# Patient Record
Sex: MALE | Race: BLACK OR AFRICAN AMERICAN | NOT HISPANIC OR LATINO | Employment: OTHER | URBAN - NONMETROPOLITAN AREA
[De-identification: names, ages, dates, MRNs, and addresses within clinical notes are randomized per-mention and may not be internally consistent; named-entity substitution may affect disease eponyms.]

---

## 2020-08-05 ENCOUNTER — HOSPITAL ENCOUNTER (INPATIENT)
Facility: HOSPITAL | Age: 45
LOS: 6 days | Discharge: HOME | End: 2020-08-11
Attending: EMERGENCY MEDICINE | Admitting: INTERNAL MEDICINE

## 2020-08-05 ENCOUNTER — APPOINTMENT (OUTPATIENT)
Dept: GENERAL RADIOLOGY | Facility: HOSPITAL | Age: 45
End: 2020-08-05

## 2020-08-05 DIAGNOSIS — R65.20 SEVERE SEPSIS (HCC): Primary | ICD-10-CM

## 2020-08-05 DIAGNOSIS — A41.9 SEVERE SEPSIS (HCC): Primary | ICD-10-CM

## 2020-08-05 DIAGNOSIS — N17.9 AKI (ACUTE KIDNEY INJURY) (HCC): ICD-10-CM

## 2020-08-05 DIAGNOSIS — J18.9 PNEUMONIA OF RIGHT LUNG DUE TO INFECTIOUS ORGANISM, UNSPECIFIED PART OF LUNG: ICD-10-CM

## 2020-08-05 DIAGNOSIS — R77.8 ELEVATED TROPONIN: ICD-10-CM

## 2020-08-05 DIAGNOSIS — E11.10 DIABETIC KETOACIDOSIS WITHOUT COMA ASSOCIATED WITH TYPE 2 DIABETES MELLITUS (HCC): ICD-10-CM

## 2020-08-05 PROBLEM — I10 ESSENTIAL HYPERTENSION: Status: ACTIVE | Noted: 2020-08-05

## 2020-08-05 LAB
ACETONE BLD QL: ABNORMAL
ALBUMIN SERPL-MCNC: 3.2 G/DL (ref 3.5–5.2)
ALBUMIN/GLOB SERPL: 1 G/DL
ALP SERPL-CCNC: 105 U/L (ref 39–117)
ALT SERPL W P-5'-P-CCNC: 18 U/L (ref 1–41)
ANION GAP SERPL CALCULATED.3IONS-SCNC: 18 MMOL/L (ref 5–15)
ANION GAP SERPL CALCULATED.3IONS-SCNC: 22 MMOL/L (ref 5–15)
APTT PPP: 24.7 SECONDS (ref 20–40.3)
ARTERIAL PATENCY WRIST A: POSITIVE
AST SERPL-CCNC: 21 U/L (ref 1–40)
ATMOSPHERIC PRESS: 748 MMHG
BASE EXCESS BLDA CALC-SCNC: -6.5 MMOL/L (ref 0–2)
BASOPHILS # BLD AUTO: 0.06 10*3/MM3 (ref 0–0.2)
BASOPHILS NFR BLD AUTO: 0.4 % (ref 0–1.5)
BDY SITE: ABNORMAL
BILIRUB SERPL-MCNC: 0.2 MG/DL (ref 0–1.2)
BUN SERPL-MCNC: 71 MG/DL (ref 6–20)
BUN SERPL-MCNC: 79 MG/DL (ref 6–20)
BUN/CREAT SERPL: 23.6 (ref 7–25)
BUN/CREAT SERPL: 25.1 (ref 7–25)
CA-I BLD-MCNC: 5.24 MG/DL (ref 4.6–5.6)
CALCIUM SPEC-SCNC: 10.4 MG/DL (ref 8.6–10.5)
CALCIUM SPEC-SCNC: 9.7 MG/DL (ref 8.6–10.5)
CHLORIDE SERPL-SCNC: 102 MMOL/L (ref 98–107)
CHLORIDE SERPL-SCNC: 103 MMOL/L (ref 98–107)
CO2 SERPL-SCNC: 16 MMOL/L (ref 22–29)
CO2 SERPL-SCNC: 20 MMOL/L (ref 22–29)
CREAT SERPL-MCNC: 2.83 MG/DL (ref 0.76–1.27)
CREAT SERPL-MCNC: 3.35 MG/DL (ref 0.76–1.27)
CRP SERPL-MCNC: 0.87 MG/DL (ref 0–0.5)
D-LACTATE SERPL-SCNC: 3.5 MMOL/L (ref 0.5–2)
D-LACTATE SERPL-SCNC: 3.7 MMOL/L (ref 0.5–2)
DEPRECATED RDW RBC AUTO: 44.1 FL (ref 37–54)
EOSINOPHIL # BLD AUTO: 0 10*3/MM3 (ref 0–0.4)
EOSINOPHIL NFR BLD AUTO: 0 % (ref 0.3–6.2)
ERYTHROCYTE [DISTWIDTH] IN BLOOD BY AUTOMATED COUNT: 14.1 % (ref 12.3–15.4)
ETHANOL BLD-MCNC: <10 MG/DL (ref 0–10)
ETHANOL UR QL: <0.01 %
GAS FLOW AIRWAY: 1.5 LPM
GFR SERPL CREATININE-BSD FRML MDRD: 24 ML/MIN/1.73
GFR SERPL CREATININE-BSD FRML MDRD: 29 ML/MIN/1.73
GLOBULIN UR ELPH-MCNC: 3.2 GM/DL
GLUCOSE SERPL-MCNC: 1116 MG/DL (ref 65–99)
GLUCOSE SERPL-MCNC: 994 MG/DL (ref 65–99)
HCO3 BLDA-SCNC: 20.9 MMOL/L (ref 20–26)
HCT VFR BLD AUTO: 50.5 % (ref 37.5–51)
HGB BLD-MCNC: 16.1 G/DL (ref 13–17.7)
HOLD SPECIMEN: NORMAL
IMM GRANULOCYTES # BLD AUTO: 0.06 10*3/MM3 (ref 0–0.05)
IMM GRANULOCYTES NFR BLD AUTO: 0.4 % (ref 0–0.5)
INR PPP: 1.06 (ref 0.8–1.2)
LYMPHOCYTES # BLD AUTO: 1.61 10*3/MM3 (ref 0.7–3.1)
LYMPHOCYTES NFR BLD AUTO: 10 % (ref 19.6–45.3)
Lab: ABNORMAL
MAGNESIUM SERPL-MCNC: 2.6 MG/DL (ref 1.6–2.6)
MAGNESIUM SERPL-MCNC: 3.2 MG/DL (ref 1.6–2.6)
MCH RBC QN AUTO: 27.7 PG (ref 26.6–33)
MCHC RBC AUTO-ENTMCNC: 31.9 G/DL (ref 31.5–35.7)
MCV RBC AUTO: 86.9 FL (ref 79–97)
MODALITY: ABNORMAL
MONOCYTES # BLD AUTO: 1.04 10*3/MM3 (ref 0.1–0.9)
MONOCYTES NFR BLD AUTO: 6.5 % (ref 5–12)
NEUTROPHILS NFR BLD AUTO: 13.26 10*3/MM3 (ref 1.7–7)
NEUTROPHILS NFR BLD AUTO: 82.7 % (ref 42.7–76)
NRBC BLD AUTO-RTO: 0 /100 WBC (ref 0–0.2)
OSMOLALITY SERPL: 384 MOSM/KG (ref 280–290)
PCO2 BLDA: 47.7 MM HG (ref 35–45)
PH BLDA: 7.25 PH UNITS (ref 7.35–7.45)
PHOSPHATE SERPL-MCNC: 5.7 MG/DL (ref 2.5–4.5)
PHOSPHATE SERPL-MCNC: 6.7 MG/DL (ref 2.5–4.5)
PLATELET # BLD AUTO: 243 10*3/MM3 (ref 140–450)
PMV BLD AUTO: 12 FL (ref 6–12)
PO2 BLDA: 99.1 MM HG (ref 83–108)
POTASSIUM SERPL-SCNC: 6.4 MMOL/L (ref 3.5–5.2)
POTASSIUM SERPL-SCNC: 6.6 MMOL/L (ref 3.5–5.2)
PROT SERPL-MCNC: 6.4 G/DL (ref 6–8.5)
PROTHROMBIN TIME: 14.3 SECONDS (ref 11.1–15.3)
RBC # BLD AUTO: 5.81 10*6/MM3 (ref 4.14–5.8)
SAO2 % BLDCOA: 97.3 % (ref 94–99)
SODIUM SERPL-SCNC: 140 MMOL/L (ref 136–145)
SODIUM SERPL-SCNC: 141 MMOL/L (ref 136–145)
TROPONIN T SERPL-MCNC: 0.31 NG/ML (ref 0–0.03)
TROPONIN T SERPL-MCNC: 0.65 NG/ML (ref 0–0.03)
VENTILATOR MODE: ABNORMAL
WBC # BLD AUTO: 16.03 10*3/MM3 (ref 3.4–10.8)
WHOLE BLOOD HOLD SPECIMEN: NORMAL
WHOLE BLOOD HOLD SPECIMEN: NORMAL

## 2020-08-05 PROCEDURE — 25010000002 AZITHROMYCIN 500 MG/250 ML: Performed by: INTERNAL MEDICINE

## 2020-08-05 PROCEDURE — 36600 WITHDRAWAL OF ARTERIAL BLOOD: CPT

## 2020-08-05 PROCEDURE — 99285 EMERGENCY DEPT VISIT HI MDM: CPT

## 2020-08-05 PROCEDURE — 87040 BLOOD CULTURE FOR BACTERIA: CPT | Performed by: EMERGENCY MEDICINE

## 2020-08-05 PROCEDURE — 80053 COMPREHEN METABOLIC PANEL: CPT | Performed by: EMERGENCY MEDICINE

## 2020-08-05 PROCEDURE — 93010 ELECTROCARDIOGRAM REPORT: CPT | Performed by: INTERNAL MEDICINE

## 2020-08-05 PROCEDURE — 87635 SARS-COV-2 COVID-19 AMP PRB: CPT | Performed by: EMERGENCY MEDICINE

## 2020-08-05 PROCEDURE — 25010000002 CEFTRIAXONE PER 250 MG: Performed by: STUDENT IN AN ORGANIZED HEALTH CARE EDUCATION/TRAINING PROGRAM

## 2020-08-05 PROCEDURE — 36415 COLL VENOUS BLD VENIPUNCTURE: CPT | Performed by: EMERGENCY MEDICINE

## 2020-08-05 PROCEDURE — 83735 ASSAY OF MAGNESIUM: CPT | Performed by: EMERGENCY MEDICINE

## 2020-08-05 PROCEDURE — 82330 ASSAY OF CALCIUM: CPT | Performed by: EMERGENCY MEDICINE

## 2020-08-05 PROCEDURE — 84100 ASSAY OF PHOSPHORUS: CPT | Performed by: EMERGENCY MEDICINE

## 2020-08-05 PROCEDURE — 85730 THROMBOPLASTIN TIME PARTIAL: CPT | Performed by: EMERGENCY MEDICINE

## 2020-08-05 PROCEDURE — 71045 X-RAY EXAM CHEST 1 VIEW: CPT

## 2020-08-05 PROCEDURE — 82803 BLOOD GASES ANY COMBINATION: CPT

## 2020-08-05 PROCEDURE — 86140 C-REACTIVE PROTEIN: CPT | Performed by: EMERGENCY MEDICINE

## 2020-08-05 PROCEDURE — 85025 COMPLETE CBC W/AUTO DIFF WBC: CPT | Performed by: EMERGENCY MEDICINE

## 2020-08-05 PROCEDURE — 25010000002 HEPARIN (PORCINE) PER 1000 UNITS: Performed by: INTERNAL MEDICINE

## 2020-08-05 PROCEDURE — 25010000003 INSULIN REGULAR HUMAN PER 5 UNITS: Performed by: EMERGENCY MEDICINE

## 2020-08-05 PROCEDURE — 84484 ASSAY OF TROPONIN QUANT: CPT | Performed by: EMERGENCY MEDICINE

## 2020-08-05 PROCEDURE — 82009 KETONE BODYS QUAL: CPT | Performed by: EMERGENCY MEDICINE

## 2020-08-05 PROCEDURE — 80307 DRUG TEST PRSMV CHEM ANLYZR: CPT | Performed by: STUDENT IN AN ORGANIZED HEALTH CARE EDUCATION/TRAINING PROGRAM

## 2020-08-05 PROCEDURE — 83930 ASSAY OF BLOOD OSMOLALITY: CPT | Performed by: EMERGENCY MEDICINE

## 2020-08-05 PROCEDURE — 83605 ASSAY OF LACTIC ACID: CPT | Performed by: EMERGENCY MEDICINE

## 2020-08-05 PROCEDURE — 25010000003 INSULIN REGULAR HUMAN PER 5 UNITS: Performed by: INTERNAL MEDICINE

## 2020-08-05 PROCEDURE — 85610 PROTHROMBIN TIME: CPT | Performed by: EMERGENCY MEDICINE

## 2020-08-05 PROCEDURE — 93005 ELECTROCARDIOGRAM TRACING: CPT | Performed by: EMERGENCY MEDICINE

## 2020-08-05 RX ORDER — ASPIRIN 81 MG/1
81 TABLET, CHEWABLE ORAL DAILY
Status: DISCONTINUED | OUTPATIENT
Start: 2020-08-06 | End: 2020-08-11 | Stop reason: HOSPADM

## 2020-08-05 RX ORDER — SODIUM CHLORIDE 9 MG/ML
10 INJECTION, SOLUTION INTRAVENOUS CONTINUOUS PRN
Status: DISCONTINUED | OUTPATIENT
Start: 2020-08-05 | End: 2020-08-05

## 2020-08-05 RX ORDER — DEXTROSE, SODIUM CHLORIDE, AND POTASSIUM CHLORIDE 5; .9; .15 G/100ML; G/100ML; G/100ML
150 INJECTION INTRAVENOUS CONTINUOUS PRN
Status: DISCONTINUED | OUTPATIENT
Start: 2020-08-05 | End: 2020-08-05

## 2020-08-05 RX ORDER — POTASSIUM CHLORIDE, DEXTROSE MONOHYDRATE AND SODIUM CHLORIDE 300; 5; 900 MG/100ML; G/100ML; MG/100ML
150 INJECTION, SOLUTION INTRAVENOUS CONTINUOUS PRN
Status: DISCONTINUED | OUTPATIENT
Start: 2020-08-05 | End: 2020-08-05

## 2020-08-05 RX ORDER — SODIUM CHLORIDE 9 MG/ML
INJECTION, SOLUTION INTRAVENOUS
Status: DISCONTINUED
Start: 2020-08-05 | End: 2020-08-11 | Stop reason: HOSPADM

## 2020-08-05 RX ORDER — SODIUM CHLORIDE, SODIUM LACTATE, POTASSIUM CHLORIDE, CALCIUM CHLORIDE 600; 310; 30; 20 MG/100ML; MG/100ML; MG/100ML; MG/100ML
250 INJECTION, SOLUTION INTRAVENOUS CONTINUOUS
Status: DISCONTINUED | OUTPATIENT
Start: 2020-08-05 | End: 2020-08-07

## 2020-08-05 RX ORDER — DEXTROSE AND SODIUM CHLORIDE 5; .9 G/100ML; G/100ML
150 INJECTION, SOLUTION INTRAVENOUS CONTINUOUS PRN
Status: DISCONTINUED | OUTPATIENT
Start: 2020-08-05 | End: 2020-08-05

## 2020-08-05 RX ORDER — ACETAMINOPHEN 325 MG/1
650 TABLET ORAL EVERY 4 HOURS PRN
Status: DISCONTINUED | OUTPATIENT
Start: 2020-08-05 | End: 2020-08-11 | Stop reason: HOSPADM

## 2020-08-05 RX ORDER — POTASSIUM CHLORIDE, DEXTROSE MONOHYDRATE AND SODIUM CHLORIDE 300; 5; 900 MG/100ML; G/100ML; MG/100ML
150 INJECTION, SOLUTION INTRAVENOUS CONTINUOUS PRN
Status: DISCONTINUED | OUTPATIENT
Start: 2020-08-05 | End: 2020-08-06

## 2020-08-05 RX ORDER — RANITIDINE 150 MG/1
TABLET ORAL
COMMUNITY

## 2020-08-05 RX ORDER — SODIUM CHLORIDE AND POTASSIUM CHLORIDE 150; 900 MG/100ML; MG/100ML
250 INJECTION, SOLUTION INTRAVENOUS CONTINUOUS PRN
Status: DISCONTINUED | OUTPATIENT
Start: 2020-08-05 | End: 2020-08-05

## 2020-08-05 RX ORDER — SODIUM CHLORIDE 0.9 % (FLUSH) 0.9 %
10 SYRINGE (ML) INJECTION AS NEEDED
Status: DISCONTINUED | OUTPATIENT
Start: 2020-08-05 | End: 2020-08-05

## 2020-08-05 RX ORDER — CARVEDILOL 25 MG/1
TABLET ORAL
COMMUNITY

## 2020-08-05 RX ORDER — SODIUM CHLORIDE 0.9 % (FLUSH) 0.9 %
3 SYRINGE (ML) INJECTION EVERY 12 HOURS SCHEDULED
Status: DISCONTINUED | OUTPATIENT
Start: 2020-08-05 | End: 2020-08-05

## 2020-08-05 RX ORDER — DEXTROSE, SODIUM CHLORIDE, AND POTASSIUM CHLORIDE 5; .45; .3 G/100ML; G/100ML; G/100ML
150 INJECTION INTRAVENOUS CONTINUOUS PRN
Status: DISCONTINUED | OUTPATIENT
Start: 2020-08-05 | End: 2020-08-06

## 2020-08-05 RX ORDER — AMLODIPINE BESYLATE 10 MG/1
10 TABLET ORAL
Status: DISCONTINUED | OUTPATIENT
Start: 2020-08-06 | End: 2020-08-05

## 2020-08-05 RX ORDER — DEXTROSE MONOHYDRATE 25 G/50ML
12.5 INJECTION, SOLUTION INTRAVENOUS AS NEEDED
Status: DISCONTINUED | OUTPATIENT
Start: 2020-08-05 | End: 2020-08-07

## 2020-08-05 RX ORDER — ASPIRIN 81 MG/1
TABLET, CHEWABLE ORAL
COMMUNITY

## 2020-08-05 RX ORDER — SODIUM CHLORIDE 450 MG/100ML
250 INJECTION, SOLUTION INTRAVENOUS CONTINUOUS PRN
Status: DISCONTINUED | OUTPATIENT
Start: 2020-08-05 | End: 2020-08-05

## 2020-08-05 RX ORDER — DEXTROSE, SODIUM CHLORIDE, AND POTASSIUM CHLORIDE 5; .45; .15 G/100ML; G/100ML; G/100ML
150 INJECTION INTRAVENOUS CONTINUOUS PRN
Status: DISCONTINUED | OUTPATIENT
Start: 2020-08-05 | End: 2020-08-06

## 2020-08-05 RX ORDER — SODIUM CHLORIDE, SODIUM LACTATE, POTASSIUM CHLORIDE, CALCIUM CHLORIDE 600; 310; 30; 20 MG/100ML; MG/100ML; MG/100ML; MG/100ML
INJECTION, SOLUTION INTRAVENOUS
Status: COMPLETED
Start: 2020-08-05 | End: 2020-08-05

## 2020-08-05 RX ORDER — ISOSORBIDE MONONITRATE 30 MG/1
TABLET, EXTENDED RELEASE ORAL
COMMUNITY

## 2020-08-05 RX ORDER — SODIUM CHLORIDE 9 MG/ML
250 INJECTION, SOLUTION INTRAVENOUS CONTINUOUS PRN
Status: DISCONTINUED | OUTPATIENT
Start: 2020-08-05 | End: 2020-08-06

## 2020-08-05 RX ORDER — SODIUM CHLORIDE AND POTASSIUM CHLORIDE 300; 900 MG/100ML; MG/100ML
250 INJECTION, SOLUTION INTRAVENOUS CONTINUOUS PRN
Status: DISCONTINUED | OUTPATIENT
Start: 2020-08-05 | End: 2020-08-06

## 2020-08-05 RX ORDER — HYDROCHLOROTHIAZIDE 25 MG/1
TABLET ORAL
COMMUNITY

## 2020-08-05 RX ORDER — DEXTROSE, SODIUM CHLORIDE, AND POTASSIUM CHLORIDE 5; .45; .15 G/100ML; G/100ML; G/100ML
150 INJECTION INTRAVENOUS CONTINUOUS PRN
Status: DISCONTINUED | OUTPATIENT
Start: 2020-08-05 | End: 2020-08-05

## 2020-08-05 RX ORDER — DEXTROSE AND SODIUM CHLORIDE 5; .45 G/100ML; G/100ML
150 INJECTION, SOLUTION INTRAVENOUS CONTINUOUS PRN
Status: DISCONTINUED | OUTPATIENT
Start: 2020-08-05 | End: 2020-08-05

## 2020-08-05 RX ORDER — SODIUM CHLORIDE AND POTASSIUM CHLORIDE 150; 450 MG/100ML; MG/100ML
250 INJECTION, SOLUTION INTRAVENOUS CONTINUOUS PRN
Status: DISCONTINUED | OUTPATIENT
Start: 2020-08-05 | End: 2020-08-06

## 2020-08-05 RX ORDER — SODIUM CHLORIDE 450 MG/100ML
250 INJECTION, SOLUTION INTRAVENOUS CONTINUOUS PRN
Status: DISCONTINUED | OUTPATIENT
Start: 2020-08-05 | End: 2020-08-06

## 2020-08-05 RX ORDER — DEXTROSE, SODIUM CHLORIDE, AND POTASSIUM CHLORIDE 5; .45; .3 G/100ML; G/100ML; G/100ML
150 INJECTION INTRAVENOUS CONTINUOUS PRN
Status: DISCONTINUED | OUTPATIENT
Start: 2020-08-05 | End: 2020-08-05

## 2020-08-05 RX ORDER — AMLODIPINE BESYLATE 10 MG/1
TABLET ORAL
COMMUNITY

## 2020-08-05 RX ORDER — SODIUM CHLORIDE 0.9 % (FLUSH) 0.9 %
10 SYRINGE (ML) INJECTION EVERY 12 HOURS SCHEDULED
Status: DISCONTINUED | OUTPATIENT
Start: 2020-08-05 | End: 2020-08-11 | Stop reason: HOSPADM

## 2020-08-05 RX ORDER — HEPARIN SODIUM 5000 [USP'U]/ML
5000 INJECTION, SOLUTION INTRAVENOUS; SUBCUTANEOUS EVERY 8 HOURS SCHEDULED
Status: DISCONTINUED | OUTPATIENT
Start: 2020-08-05 | End: 2020-08-11 | Stop reason: HOSPADM

## 2020-08-05 RX ORDER — SODIUM CHLORIDE 0.9 % (FLUSH) 0.9 %
10 SYRINGE (ML) INJECTION ONCE AS NEEDED
Status: DISCONTINUED | OUTPATIENT
Start: 2020-08-05 | End: 2020-08-05

## 2020-08-05 RX ORDER — DEXTROSE AND SODIUM CHLORIDE 5; .45 G/100ML; G/100ML
150 INJECTION, SOLUTION INTRAVENOUS CONTINUOUS PRN
Status: DISPENSED | OUTPATIENT
Start: 2020-08-05 | End: 2020-08-07

## 2020-08-05 RX ORDER — SODIUM CHLORIDE 9 MG/ML
10 INJECTION, SOLUTION INTRAVENOUS CONTINUOUS PRN
Status: DISCONTINUED | OUTPATIENT
Start: 2020-08-05 | End: 2020-08-11 | Stop reason: HOSPADM

## 2020-08-05 RX ORDER — AZITHROMYCIN 250 MG/1
500 TABLET, FILM COATED ORAL
Status: DISCONTINUED | OUTPATIENT
Start: 2020-08-06 | End: 2020-08-07

## 2020-08-05 RX ORDER — SODIUM CHLORIDE 0.9 % (FLUSH) 0.9 %
10 SYRINGE (ML) INJECTION ONCE AS NEEDED
Status: DISCONTINUED | OUTPATIENT
Start: 2020-08-05 | End: 2020-08-11 | Stop reason: HOSPADM

## 2020-08-05 RX ORDER — DEXTROSE AND SODIUM CHLORIDE 5; .9 G/100ML; G/100ML
150 INJECTION, SOLUTION INTRAVENOUS CONTINUOUS PRN
Status: DISCONTINUED | OUTPATIENT
Start: 2020-08-05 | End: 2020-08-06

## 2020-08-05 RX ORDER — SODIUM CHLORIDE 0.9 % (FLUSH) 0.9 %
10 SYRINGE (ML) INJECTION AS NEEDED
Status: DISCONTINUED | OUTPATIENT
Start: 2020-08-05 | End: 2020-08-11 | Stop reason: HOSPADM

## 2020-08-05 RX ORDER — PANTOPRAZOLE SODIUM 40 MG/10ML
40 INJECTION, POWDER, LYOPHILIZED, FOR SOLUTION INTRAVENOUS
Status: DISCONTINUED | OUTPATIENT
Start: 2020-08-06 | End: 2020-08-11 | Stop reason: HOSPADM

## 2020-08-05 RX ORDER — SODIUM CHLORIDE 9 MG/ML
250 INJECTION, SOLUTION INTRAVENOUS CONTINUOUS PRN
Status: DISCONTINUED | OUTPATIENT
Start: 2020-08-05 | End: 2020-08-05

## 2020-08-05 RX ORDER — CARVEDILOL 25 MG/1
25 TABLET ORAL 2 TIMES DAILY WITH MEALS
Status: DISCONTINUED | OUTPATIENT
Start: 2020-08-06 | End: 2020-08-06

## 2020-08-05 RX ORDER — SODIUM CHLORIDE AND POTASSIUM CHLORIDE 150; 450 MG/100ML; MG/100ML
250 INJECTION, SOLUTION INTRAVENOUS CONTINUOUS PRN
Status: DISCONTINUED | OUTPATIENT
Start: 2020-08-05 | End: 2020-08-05

## 2020-08-05 RX ORDER — ISOSORBIDE MONONITRATE 30 MG/1
30 TABLET, EXTENDED RELEASE ORAL
Status: DISCONTINUED | OUTPATIENT
Start: 2020-08-06 | End: 2020-08-05

## 2020-08-05 RX ORDER — DEXTROSE, SODIUM CHLORIDE, AND POTASSIUM CHLORIDE 5; .9; .15 G/100ML; G/100ML; G/100ML
150 INJECTION INTRAVENOUS CONTINUOUS PRN
Status: DISCONTINUED | OUTPATIENT
Start: 2020-08-05 | End: 2020-08-06

## 2020-08-05 RX ORDER — SODIUM CHLORIDE 0.9 % (FLUSH) 0.9 %
3 SYRINGE (ML) INJECTION EVERY 12 HOURS SCHEDULED
Status: DISCONTINUED | OUTPATIENT
Start: 2020-08-05 | End: 2020-08-11 | Stop reason: HOSPADM

## 2020-08-05 RX ORDER — ONDANSETRON 2 MG/ML
4 INJECTION INTRAMUSCULAR; INTRAVENOUS EVERY 6 HOURS PRN
Status: DISCONTINUED | OUTPATIENT
Start: 2020-08-05 | End: 2020-08-11 | Stop reason: HOSPADM

## 2020-08-05 RX ORDER — DEXTROSE MONOHYDRATE 25 G/50ML
12.5 INJECTION, SOLUTION INTRAVENOUS AS NEEDED
Status: DISCONTINUED | OUTPATIENT
Start: 2020-08-05 | End: 2020-08-05

## 2020-08-05 RX ORDER — SODIUM CHLORIDE AND POTASSIUM CHLORIDE 300; 900 MG/100ML; MG/100ML
250 INJECTION, SOLUTION INTRAVENOUS CONTINUOUS PRN
Status: DISCONTINUED | OUTPATIENT
Start: 2020-08-05 | End: 2020-08-05

## 2020-08-05 RX ORDER — SODIUM CHLORIDE AND POTASSIUM CHLORIDE 150; 900 MG/100ML; MG/100ML
250 INJECTION, SOLUTION INTRAVENOUS CONTINUOUS PRN
Status: DISCONTINUED | OUTPATIENT
Start: 2020-08-05 | End: 2020-08-06

## 2020-08-05 RX ORDER — METHOCARBAMOL 500 MG/1
TABLET, FILM COATED ORAL
COMMUNITY

## 2020-08-05 RX ADMIN — SODIUM CHLORIDE 15 UNITS/HR: 9 INJECTION, SOLUTION INTRAVENOUS at 21:42

## 2020-08-05 RX ADMIN — Medication 3 ML: at 20:28

## 2020-08-05 RX ADMIN — SODIUM CHLORIDE, PRESERVATIVE FREE 10 ML: 5 INJECTION INTRAVENOUS at 21:44

## 2020-08-05 RX ADMIN — SODIUM CHLORIDE, POTASSIUM CHLORIDE, SODIUM LACTATE AND CALCIUM CHLORIDE 250 ML/HR: 600; 310; 30; 20 INJECTION, SOLUTION INTRAVENOUS at 22:05

## 2020-08-05 RX ADMIN — SODIUM CHLORIDE 2466 ML: 900 INJECTION, SOLUTION INTRAVENOUS at 18:05

## 2020-08-05 RX ADMIN — CEFTRIAXONE SODIUM 2 G: 2 INJECTION, POWDER, FOR SOLUTION INTRAMUSCULAR; INTRAVENOUS at 21:24

## 2020-08-05 RX ADMIN — SODIUM CHLORIDE, PRESERVATIVE FREE 3 ML: 5 INJECTION INTRAVENOUS at 21:44

## 2020-08-05 RX ADMIN — SODIUM CHLORIDE 13 UNITS/HR: 9 INJECTION, SOLUTION INTRAVENOUS at 20:00

## 2020-08-05 RX ADMIN — AZITHROMYCIN 500 MG: 500 INJECTION, POWDER, LYOPHILIZED, FOR SOLUTION INTRAVENOUS at 22:05

## 2020-08-05 RX ADMIN — SODIUM CHLORIDE 250 ML/HR: 4.5 INJECTION, SOLUTION INTRAVENOUS at 21:23

## 2020-08-05 RX ADMIN — SODIUM CHLORIDE, POTASSIUM CHLORIDE, SODIUM LACTATE AND CALCIUM CHLORIDE 1000 ML: 600; 310; 30; 20 INJECTION, SOLUTION INTRAVENOUS at 23:35

## 2020-08-05 RX ADMIN — HEPARIN SODIUM 5000 UNITS: 5000 INJECTION INTRAVENOUS; SUBCUTANEOUS at 23:35

## 2020-08-05 NOTE — ED NOTES
Patient was placed in face mask during first look triage.  Patient was wearing a face mask throughout encounter.  This RN wore personal protective equipment throughout the encounter.  Hand hygiene was performed before and after patient encounter.       Twila Vee, RN  08/05/20 1227

## 2020-08-05 NOTE — ED NOTES
Lab notified of patient screening positive for severe sepsis and needing second set of blood cultures and acetone.      Twila Vee RN  08/05/20 2205

## 2020-08-05 NOTE — ED PROVIDER NOTES
"Subjective   44 y/o with T2DM, HTN, HFrEF, GERD, presents complaining of nausea and abdominal pain of 2 days duration. He has not missed insulin. Nausea has progressed with no emesis and today he felt enough malaise to be brought in by ambulance for evaluation to the ED. Complains of generalized abdominal pain, 6/10, constant, dull/aching. No agggravating or alleviating factors. Associated with nausea, malaise. EMS glucose monitor measured \"high.\"           Review of Systems   Constitutional: Positive for chills. Negative for fatigue and fever.   HENT: Negative for congestion and sore throat.    Eyes: Negative for pain and visual disturbance.   Respiratory: Negative for cough and shortness of breath.    Cardiovascular: Negative for chest pain and leg swelling.   Gastrointestinal: Positive for abdominal pain and nausea.   Genitourinary: Negative for dysuria and flank pain.   Musculoskeletal: Negative for arthralgias and back pain.   Skin: Negative for pallor and rash.   Neurological: Negative for dizziness and headaches.   Psychiatric/Behavioral: Negative for confusion and dysphoric mood.       History reviewed. No pertinent past medical history.    Allergies   Allergen Reactions   • Penicillins Unknown - Low Severity       No past surgical history on file.    History reviewed. No pertinent family history.    Social History     Socioeconomic History   • Marital status:      Spouse name: Not on file   • Number of children: Not on file   • Years of education: Not on file   • Highest education level: Not on file           Objective   Physical Exam   Constitutional: He is oriented to person, place, and time. He appears well-developed and well-nourished. He appears ill. No distress.   HENT:   Head: Normocephalic and atraumatic.   Eyes: Pupils are equal, round, and reactive to light. EOM are normal.   Neck: Normal range of motion. Neck supple.   Cardiovascular: Regular rhythm and intact distal pulses. Tachycardia " present.   Pulmonary/Chest: Effort normal. He has decreased breath sounds (body habitus). He has no wheezes. He has no rhonchi. He has no rales.   Abdominal: Soft. There is tenderness (diffusely).   Musculoskeletal: Normal range of motion.        Right lower leg: He exhibits no edema.        Left lower leg: He exhibits no edema.   Neurological: He is alert and oriented to person, place, and time.   Skin: Capillary refill takes 2 to 3 seconds. There is pallor.       Procedures           ED Course  ED Course as of Aug 05 2111   Wed Aug 05, 2020   1809 Sepsis bolus ordered, VS monitored   BP(!): 70/46 [CZ]   1948 Acetone(!): Moderate [CZ]   2037 Troponin T(!!): 0.313 [EMMA]      ED Course User Index  [CZ] Cullen Keane MD  [EMMA] Negrito Rebollar PA-C                                           MDM  Number of Diagnoses or Management Options  Diabetic ketoacidosis without coma associated with type 2 diabetes mellitus (CMS/HCC): established and improving  Severe sepsis (CMS/HCC): new and requires workup  Diagnosis management comments: Patient with known T2DM with worsening shortness of air and malaise with work up indicative of DKA by pH, CO2, glucose level, serum acetone. He additionally has a RLL pnemonia. His vitals were such that he was classified as severe sepsis (elevated WBC and source) and intially had low BP that responded well to NS bolus. Lactate elevated. To receive 2g rocephin and 500 mg azithromycin. Also has possible HEATHER, no baseline renal function known. He also has an elevated troponin of undetermined significance as he is not complaining of chest pain. Hospitalist consulted and sign out given by Dr. Kraft to Dr. Hi who will admit patient and handle further management.        Amount and/or Complexity of Data Reviewed  Clinical lab tests: reviewed and ordered  Tests in the radiology section of CPT®: reviewed and ordered  Tests in the medicine section of CPT®: reviewed and ordered  Discuss  the patient with other providers: yes  Independent visualization of images, tracings, or specimens: yes    Risk of Complications, Morbidity, and/or Mortality  Presenting problems: moderate  Diagnostic procedures: moderate  Management options: moderate    Patient Progress  Patient progress: improved      Final diagnoses:   Severe sepsis (CMS/HCC)   Diabetic ketoacidosis without coma associated with type 2 diabetes mellitus (CMS/HCC)   HEATHER (acute kidney injury) (CMS/[facility])   Elevated troponin   Pneumonia of right lung due to infectious organism, unspecified part of lung            Cullen Keane MD  Resident  08/05/20 2774

## 2020-08-06 LAB
ANION GAP SERPL CALCULATED.3IONS-SCNC: 11 MMOL/L (ref 5–15)
ANION GAP SERPL CALCULATED.3IONS-SCNC: 14 MMOL/L (ref 5–15)
ANION GAP SERPL CALCULATED.3IONS-SCNC: 14 MMOL/L (ref 5–15)
ANION GAP SERPL CALCULATED.3IONS-SCNC: 19 MMOL/L (ref 5–15)
ARTERIAL PATENCY WRIST A: ABNORMAL
ARTERIAL PATENCY WRIST A: ABNORMAL
ATMOSPHERIC PRESS: 749 MMHG
ATMOSPHERIC PRESS: 749 MMHG
BACTERIA UR QL AUTO: ABNORMAL /HPF
BASE EXCESS BLDA CALC-SCNC: -3.4 MMOL/L (ref 0–2)
BASE EXCESS BLDA CALC-SCNC: -4.1 MMOL/L (ref 0–2)
BASOPHILS # BLD AUTO: 0.03 10*3/MM3 (ref 0–0.2)
BASOPHILS NFR BLD AUTO: 0.2 % (ref 0–1.5)
BDY SITE: ABNORMAL
BDY SITE: ABNORMAL
BILIRUB UR QL STRIP: NEGATIVE
BUN SERPL-MCNC: 61 MG/DL (ref 6–20)
BUN SERPL-MCNC: 69 MG/DL (ref 6–20)
BUN SERPL-MCNC: 72 MG/DL (ref 6–20)
BUN SERPL-MCNC: 78 MG/DL (ref 6–20)
BUN/CREAT SERPL: 20.7 (ref 7–25)
BUN/CREAT SERPL: 22.1 (ref 7–25)
BUN/CREAT SERPL: 22.2 (ref 7–25)
BUN/CREAT SERPL: 22.8 (ref 7–25)
CALCIUM SPEC-SCNC: 10 MG/DL (ref 8.6–10.5)
CALCIUM SPEC-SCNC: 10 MG/DL (ref 8.6–10.5)
CALCIUM SPEC-SCNC: 9.4 MG/DL (ref 8.6–10.5)
CALCIUM SPEC-SCNC: 9.6 MG/DL (ref 8.6–10.5)
CHLORIDE SERPL-SCNC: 112 MMOL/L (ref 98–107)
CHLORIDE SERPL-SCNC: 114 MMOL/L (ref 98–107)
CHLORIDE SERPL-SCNC: 115 MMOL/L (ref 98–107)
CHLORIDE SERPL-SCNC: 118 MMOL/L (ref 98–107)
CLARITY UR: ABNORMAL
CO2 SERPL-SCNC: 18 MMOL/L (ref 22–29)
CO2 SERPL-SCNC: 23 MMOL/L (ref 22–29)
CO2 SERPL-SCNC: 23 MMOL/L (ref 22–29)
CO2 SERPL-SCNC: 24 MMOL/L (ref 22–29)
COLOR UR: YELLOW
CREAT SERPL-MCNC: 2.76 MG/DL (ref 0.76–1.27)
CREAT SERPL-MCNC: 3.11 MG/DL (ref 0.76–1.27)
CREAT SERPL-MCNC: 3.16 MG/DL (ref 0.76–1.27)
CREAT SERPL-MCNC: 3.77 MG/DL (ref 0.76–1.27)
D-LACTATE SERPL-SCNC: 4.5 MMOL/L (ref 0.5–2)
DEPRECATED RDW RBC AUTO: 43.8 FL (ref 37–54)
EOSINOPHIL # BLD AUTO: 0.01 10*3/MM3 (ref 0–0.4)
EOSINOPHIL NFR BLD AUTO: 0.1 % (ref 0.3–6.2)
ERYTHROCYTE [DISTWIDTH] IN BLOOD BY AUTOMATED COUNT: 13.7 % (ref 12.3–15.4)
GAS FLOW AIRWAY: 1 LPM
GAS FLOW AIRWAY: 2 LPM
GFR SERPL CREATININE-BSD FRML MDRD: 21 ML/MIN/1.73
GFR SERPL CREATININE-BSD FRML MDRD: 26 ML/MIN/1.73
GFR SERPL CREATININE-BSD FRML MDRD: 26 ML/MIN/1.73
GFR SERPL CREATININE-BSD FRML MDRD: 30 ML/MIN/1.73
GLUCOSE BLDC GLUCOMTR-MCNC: 127 MG/DL (ref 70–130)
GLUCOSE BLDC GLUCOMTR-MCNC: 128 MG/DL (ref 70–130)
GLUCOSE BLDC GLUCOMTR-MCNC: 131 MG/DL (ref 70–130)
GLUCOSE BLDC GLUCOMTR-MCNC: 139 MG/DL (ref 70–130)
GLUCOSE BLDC GLUCOMTR-MCNC: 143 MG/DL (ref 70–130)
GLUCOSE BLDC GLUCOMTR-MCNC: 149 MG/DL (ref 70–130)
GLUCOSE BLDC GLUCOMTR-MCNC: 168 MG/DL (ref 70–130)
GLUCOSE BLDC GLUCOMTR-MCNC: 171 MG/DL (ref 70–130)
GLUCOSE BLDC GLUCOMTR-MCNC: 175 MG/DL (ref 70–130)
GLUCOSE BLDC GLUCOMTR-MCNC: 190 MG/DL (ref 70–130)
GLUCOSE BLDC GLUCOMTR-MCNC: 205 MG/DL (ref 70–130)
GLUCOSE BLDC GLUCOMTR-MCNC: 226 MG/DL (ref 70–130)
GLUCOSE BLDC GLUCOMTR-MCNC: 249 MG/DL (ref 70–130)
GLUCOSE BLDC GLUCOMTR-MCNC: 254 MG/DL (ref 70–130)
GLUCOSE BLDC GLUCOMTR-MCNC: 267 MG/DL (ref 70–130)
GLUCOSE BLDC GLUCOMTR-MCNC: 288 MG/DL (ref 70–130)
GLUCOSE BLDC GLUCOMTR-MCNC: 344 MG/DL (ref 70–130)
GLUCOSE BLDC GLUCOMTR-MCNC: 353 MG/DL (ref 70–130)
GLUCOSE BLDC GLUCOMTR-MCNC: 460 MG/DL (ref 70–130)
GLUCOSE BLDC GLUCOMTR-MCNC: 52 MG/DL (ref 70–130)
GLUCOSE BLDC GLUCOMTR-MCNC: 572 MG/DL (ref 70–130)
GLUCOSE BLDC GLUCOMTR-MCNC: 584 MG/DL (ref 70–130)
GLUCOSE BLDC GLUCOMTR-MCNC: 590 MG/DL (ref 70–130)
GLUCOSE SERPL-MCNC: 149 MG/DL (ref 65–99)
GLUCOSE SERPL-MCNC: 253 MG/DL (ref 65–99)
GLUCOSE SERPL-MCNC: 281 MG/DL (ref 65–99)
GLUCOSE SERPL-MCNC: 284 MG/DL (ref 65–99)
GLUCOSE UR STRIP-MCNC: ABNORMAL MG/DL
HCO3 BLDA-SCNC: 21.8 MMOL/L (ref 20–26)
HCO3 BLDA-SCNC: 23.1 MMOL/L (ref 20–26)
HCT VFR BLD AUTO: 37.4 % (ref 37.5–51)
HGB BLD-MCNC: 11.9 G/DL (ref 13–17.7)
HGB UR QL STRIP.AUTO: NEGATIVE
HYALINE CASTS UR QL AUTO: ABNORMAL /LPF
IMM GRANULOCYTES # BLD AUTO: 0.06 10*3/MM3 (ref 0–0.05)
IMM GRANULOCYTES NFR BLD AUTO: 0.4 % (ref 0–0.5)
KETONES UR QL STRIP: ABNORMAL
LDH SERPL-CCNC: 231 U/L (ref 135–225)
LEUKOCYTE ESTERASE UR QL STRIP.AUTO: ABNORMAL
LYMPHOCYTES # BLD AUTO: 1.35 10*3/MM3 (ref 0.7–3.1)
LYMPHOCYTES NFR BLD AUTO: 9.8 % (ref 19.6–45.3)
Lab: ABNORMAL
Lab: ABNORMAL
MAGNESIUM SERPL-MCNC: 1.9 MG/DL (ref 1.6–2.6)
MCH RBC QN AUTO: 27.9 PG (ref 26.6–33)
MCHC RBC AUTO-ENTMCNC: 31.8 G/DL (ref 31.5–35.7)
MCV RBC AUTO: 87.6 FL (ref 79–97)
MODALITY: ABNORMAL
MODALITY: ABNORMAL
MONOCYTES # BLD AUTO: 1.28 10*3/MM3 (ref 0.1–0.9)
MONOCYTES NFR BLD AUTO: 9.3 % (ref 5–12)
NEUTROPHILS NFR BLD AUTO: 10.99 10*3/MM3 (ref 1.7–7)
NEUTROPHILS NFR BLD AUTO: 80.2 % (ref 42.7–76)
NITRITE UR QL STRIP: NEGATIVE
NRBC BLD AUTO-RTO: 0 /100 WBC (ref 0–0.2)
PCO2 BLDA: 42 MM HG (ref 35–45)
PCO2 BLDA: 46.1 MM HG (ref 35–45)
PH BLDA: 7.31 PH UNITS (ref 7.35–7.45)
PH BLDA: 7.32 PH UNITS (ref 7.35–7.45)
PH UR STRIP.AUTO: <=5 [PH] (ref 5–9)
PLATELET # BLD AUTO: 174 10*3/MM3 (ref 140–450)
PMV BLD AUTO: 11.1 FL (ref 6–12)
PO2 BLDA: 114 MM HG (ref 83–108)
PO2 BLDA: 91.6 MM HG (ref 83–108)
POTASSIUM SERPL-SCNC: 4.6 MMOL/L (ref 3.5–5.2)
POTASSIUM SERPL-SCNC: 4.6 MMOL/L (ref 3.5–5.2)
POTASSIUM SERPL-SCNC: 4.7 MMOL/L (ref 3.5–5.2)
POTASSIUM SERPL-SCNC: 4.8 MMOL/L (ref 3.5–5.2)
PROCALCITONIN SERPL-MCNC: 0.65 NG/ML (ref 0–0.25)
PROT UR QL STRIP: ABNORMAL
RBC # BLD AUTO: 4.27 10*6/MM3 (ref 4.14–5.8)
RBC # UR: ABNORMAL /HPF
REF LAB TEST METHOD: ABNORMAL
SAO2 % BLDCOA: 96.9 % (ref 94–99)
SAO2 % BLDCOA: 98.4 % (ref 94–99)
SARS-COV-2 N GENE RESP QL NAA+PROBE: NOT DETECTED
SODIUM SERPL-SCNC: 149 MMOL/L (ref 136–145)
SODIUM SERPL-SCNC: 152 MMOL/L (ref 136–145)
SP GR UR STRIP: 1.03 (ref 1–1.03)
SQUAMOUS #/AREA URNS HPF: ABNORMAL /HPF
TROPONIN T SERPL-MCNC: 0.48 NG/ML (ref 0–0.03)
TROPONIN T SERPL-MCNC: 0.74 NG/ML (ref 0–0.03)
TROPONIN T SERPL-MCNC: 0.75 NG/ML (ref 0–0.03)
UROBILINOGEN UR QL STRIP: ABNORMAL
VENTILATOR MODE: ABNORMAL
VENTILATOR MODE: ABNORMAL
WBC # BLD AUTO: 13.72 10*3/MM3 (ref 3.4–10.8)
WBC UR QL AUTO: ABNORMAL /HPF

## 2020-08-06 PROCEDURE — 83735 ASSAY OF MAGNESIUM: CPT | Performed by: INTERNAL MEDICINE

## 2020-08-06 PROCEDURE — 80048 BASIC METABOLIC PNL TOTAL CA: CPT | Performed by: INTERNAL MEDICINE

## 2020-08-06 PROCEDURE — 84145 PROCALCITONIN (PCT): CPT | Performed by: INTERNAL MEDICINE

## 2020-08-06 PROCEDURE — 85025 COMPLETE CBC W/AUTO DIFF WBC: CPT | Performed by: INTERNAL MEDICINE

## 2020-08-06 PROCEDURE — 84484 ASSAY OF TROPONIN QUANT: CPT | Performed by: INTERNAL MEDICINE

## 2020-08-06 PROCEDURE — 36415 COLL VENOUS BLD VENIPUNCTURE: CPT | Performed by: INTERNAL MEDICINE

## 2020-08-06 PROCEDURE — 82962 GLUCOSE BLOOD TEST: CPT

## 2020-08-06 PROCEDURE — 82803 BLOOD GASES ANY COMBINATION: CPT

## 2020-08-06 PROCEDURE — 83615 LACTATE (LD) (LDH) ENZYME: CPT | Performed by: INTERNAL MEDICINE

## 2020-08-06 PROCEDURE — 82010 KETONE BODYS QUAN: CPT | Performed by: INTERNAL MEDICINE

## 2020-08-06 PROCEDURE — 25010000002 CEFTRIAXONE PER 250 MG: Performed by: INTERNAL MEDICINE

## 2020-08-06 PROCEDURE — 25010000002 HEPARIN (PORCINE) PER 1000 UNITS: Performed by: INTERNAL MEDICINE

## 2020-08-06 PROCEDURE — 94799 UNLISTED PULMONARY SVC/PX: CPT

## 2020-08-06 PROCEDURE — 84484 ASSAY OF TROPONIN QUANT: CPT | Performed by: EMERGENCY MEDICINE

## 2020-08-06 PROCEDURE — 81001 URINALYSIS AUTO W/SCOPE: CPT | Performed by: INTERNAL MEDICINE

## 2020-08-06 PROCEDURE — 83036 HEMOGLOBIN GLYCOSYLATED A1C: CPT

## 2020-08-06 PROCEDURE — 36600 WITHDRAWAL OF ARTERIAL BLOOD: CPT

## 2020-08-06 PROCEDURE — 87086 URINE CULTURE/COLONY COUNT: CPT | Performed by: INTERNAL MEDICINE

## 2020-08-06 PROCEDURE — 83605 ASSAY OF LACTIC ACID: CPT | Performed by: INTERNAL MEDICINE

## 2020-08-06 PROCEDURE — 25010000003 INSULIN REGULAR HUMAN PER 5 UNITS: Performed by: INTERNAL MEDICINE

## 2020-08-06 RX ADMIN — DEXTROSE MONOHYDRATE 12.5 G: 25 INJECTION, SOLUTION INTRAVENOUS at 10:14

## 2020-08-06 RX ADMIN — CEFTRIAXONE SODIUM 2 G: 2 INJECTION, POWDER, FOR SOLUTION INTRAMUSCULAR; INTRAVENOUS at 20:47

## 2020-08-06 RX ADMIN — HEPARIN SODIUM 5000 UNITS: 5000 INJECTION INTRAVENOUS; SUBCUTANEOUS at 22:34

## 2020-08-06 RX ADMIN — SODIUM CHLORIDE, PRESERVATIVE FREE 3 ML: 5 INJECTION INTRAVENOUS at 20:21

## 2020-08-06 RX ADMIN — SODIUM CHLORIDE, PRESERVATIVE FREE 3 ML: 5 INJECTION INTRAVENOUS at 10:15

## 2020-08-06 RX ADMIN — AZITHROMYCIN 500 MG: 250 TABLET, FILM COATED ORAL at 20:46

## 2020-08-06 RX ADMIN — SODIUM CHLORIDE, POTASSIUM CHLORIDE, SODIUM LACTATE AND CALCIUM CHLORIDE 500 ML/HR: 600; 310; 30; 20 INJECTION, SOLUTION INTRAVENOUS at 05:13

## 2020-08-06 RX ADMIN — ASPIRIN 81 MG 81 MG: 81 TABLET ORAL at 08:59

## 2020-08-06 RX ADMIN — POTASSIUM CHLORIDE, DEXTROSE MONOHYDRATE AND SODIUM CHLORIDE 150 ML/HR: 150; 5; 450 INJECTION, SOLUTION INTRAVENOUS at 12:08

## 2020-08-06 RX ADMIN — SODIUM CHLORIDE 8 UNITS/HR: 9 INJECTION, SOLUTION INTRAVENOUS at 10:22

## 2020-08-06 RX ADMIN — SODIUM CHLORIDE, POTASSIUM CHLORIDE, SODIUM LACTATE AND CALCIUM CHLORIDE 500 ML/HR: 600; 310; 30; 20 INJECTION, SOLUTION INTRAVENOUS at 02:55

## 2020-08-06 RX ADMIN — SODIUM CHLORIDE, POTASSIUM CHLORIDE, SODIUM LACTATE AND CALCIUM CHLORIDE 500 ML/HR: 600; 310; 30; 20 INJECTION, SOLUTION INTRAVENOUS at 00:52

## 2020-08-06 RX ADMIN — HEPARIN SODIUM 5000 UNITS: 5000 INJECTION INTRAVENOUS; SUBCUTANEOUS at 15:11

## 2020-08-06 RX ADMIN — PANTOPRAZOLE SODIUM 40 MG: 40 INJECTION, POWDER, FOR SOLUTION INTRAVENOUS at 06:04

## 2020-08-06 RX ADMIN — SODIUM CHLORIDE 30 UNITS/HR: 9 INJECTION, SOLUTION INTRAVENOUS at 05:05

## 2020-08-06 RX ADMIN — SODIUM CHLORIDE 23 UNITS/HR: 9 INJECTION, SOLUTION INTRAVENOUS at 01:09

## 2020-08-06 RX ADMIN — DEXTROSE AND SODIUM CHLORIDE 150 ML/HR: 5; 450 INJECTION, SOLUTION INTRAVENOUS at 22:14

## 2020-08-06 RX ADMIN — SODIUM CHLORIDE, PRESERVATIVE FREE 10 ML: 5 INJECTION INTRAVENOUS at 20:21

## 2020-08-06 RX ADMIN — SODIUM CHLORIDE, PRESERVATIVE FREE 10 ML: 5 INJECTION INTRAVENOUS at 10:15

## 2020-08-06 RX ADMIN — HEPARIN SODIUM 5000 UNITS: 5000 INJECTION INTRAVENOUS; SUBCUTANEOUS at 06:04

## 2020-08-06 NOTE — PROGRESS NOTES
[facility]  INPATIENT PROGRESS NOTE    Length of Stay: 1  Date of Admission: 8/5/2020  Primary Care Physician: Maninder Dixon MD    Subjective   (S) negative for fever and nausea, vomiting; he stated that he feels slightly better    Review of Systems   Constitutional: Negative for fever.   HENT: Negative for congestion and trouble swallowing.    Eyes: Negative for redness.   Respiratory: Negative for chest tightness and shortness of breath.    Cardiovascular: Negative for chest pain.   Genitourinary: Negative for difficulty urinating.   Musculoskeletal: Negative for arthralgias.   Neurological: Negative for dizziness and headaches.   Psychiatric/Behavioral: Negative for agitation and behavioral problems.     All pertinent negatives and positives are as above. All other systems have been reviewed and are negative unless otherwise stated.     Prior to Admission medications    Medication Sig Start Date End Date Taking? Authorizing Provider   amLODIPine (NORVASC) 10 MG tablet amlodipine 10 mg tablet    Beth Cho MD   aspirin 81 MG chewable tablet aspirin 81 mg chewable tablet    Beth Cho MD   carvedilol (COREG) 25 MG tablet carvedilol 25 mg tablet    Beth Cho MD   Ergocalciferol (VITAMIN D2 PO) Vitamin D2 1,250 mcg (50,000 unit) capsule    Beth Cho MD   hydroCHLOROthiazide (HYDRODIURIL) 25 MG tablet hydrochlorothiazide 25 mg tablet    Beth Cho MD   Insulin Glargine (BASAGLAR KWIKPEN SC) Basaglar KwikPen U-100 Insulin 100 unit/mL (3 mL) subcutaneous    Beth Cho MD   isosorbide mononitrate (IMDUR) 30 MG 24 hr tablet isosorbide mononitrate ER 30 mg tablet,extended release 24 hr   Take 1 tablet every day by oral route.    Beth Cho MD   metFORMIN (GLUCOPHAGE) 1000 MG tablet metformin 1,000 mg tablet    Beth Cho MD   methocarbamol (ROBAXIN) 500 MG tablet  methocarbamol 500 mg tablet   Take 1 tablet every day by oral route.    Provider, MD Beth   raNITIdine (ZANTAC) 150 MG tablet ranitidine 150 mg tablet    Provider, MD Beth         aspirin 81 mg Oral Daily   azithromycin 500 mg Oral Q24H   cefTRIAXone 2 g Intravenous Q24H   heparin (porcine) 5,000 Units Subcutaneous Q8H   pantoprazole 40 mg Intravenous Q AM   sodium chloride 10 mL Intravenous Q12H   sodium chloride 3 mL Intravenous Q12H       dextrose 5 % and sodium chloride 0.45 % 150 mL/hr    dextrose 5 % and sodium chloride 0.45 % with KCl 20 mEq/L 150 mL/hr Last Rate: 150 mL/hr (08/06/20 1208)   dextrose 5 % and sodium chloride 0.45 % with KCl 40 mEq/L 150 mL/hr    dextrose 5 % and sodium chloride 0.9 % 150 mL/hr    dextrose 5 % and sodium chloride 0.9 % with KCl 20 mEq 150 mL/hr    dextrose 5% and sodium chloride 0.9% with KCl 40 mEq/L 150 mL/hr    insulin 13 Units/hr Last Rate: 3 Units/hr (08/06/20 1625)   lactated ringers 250 mL/hr Last Rate: 250 mL/hr (08/06/20 0706)   custom IV KCl infusion builder 250 mL/hr    sodium chloride 250 mL/hr    sodium chloride 0.45 % with KCl 20 mEq 250 mL/hr    sodium chloride 250 mL/hr    sodium chloride 10 mL/hr    sodium chloride 0.9 % with KCl 20 mEq 250 mL/hr    sodium chloride 0.9 % with KCl 40 mEq/L 250 mL/hr        Objective    Temp:  [98.1 °F (36.7 °C)-98.8 °F (37.1 °C)] 98.8 °F (37.1 °C)  Heart Rate:  [] 110  Resp:  [18-32] 22  BP: ()/(43-79) 135/60    Physical Exam  Obese patient, no in acute distress  Neck is supple; EOMI; oral mucous moist  Normal expansion of bilateral lungs; CTA x 2  Normal sinus rhythm; no murmurs  Abdomen globose, no tender to palpation; normal bowel sounds  Able to move upper and lower extremities  Alert and oriented x 4; no neuro focalization  He is calm, well behaved      Results Review:  I have reviewed the labs, radiology results, and diagnostic studies.    Laboratory Data:   Results from last 7 days   Lab Units  08/06/20  1115 08/06/20  0354 08/05/20  2225 08/05/20  1927   SODIUM mmol/L 152* 149* 141 140   POTASSIUM mmol/L 4.7 4.6 6.6* 6.4*   CHLORIDE mmol/L 115* 112* 103 102   CO2 mmol/L 23.0 18.0* 16.0* 20.0*   BUN mg/dL 78* 61* 79* 71*   CREATININE mg/dL 3.77* 2.76* 3.35* 2.83*   GLUCOSE mg/dL 149* 284* 994* 1,116*   CALCIUM mg/dL 10.0 9.4 10.4 9.7   BILIRUBIN mg/dL  --   --   --  0.2   ALK PHOS U/L  --   --   --  105   ALT (SGPT) U/L  --   --   --  18   AST (SGOT) U/L  --   --   --  21   ANION GAP mmol/L 14.0 19.0* 22.0* 18.0*     Estimated Creatinine Clearance: 36.4 mL/min (A) (by C-G formula based on SCr of 3.77 mg/dL (H)).  Results from last 7 days   Lab Units 08/06/20  0354 08/05/20  1927 08/05/20  1811   MAGNESIUM mg/dL 1.9 2.6 3.2*   PHOSPHORUS mg/dL  --  5.7* 6.7*         Results from last 7 days   Lab Units 08/06/20  0354 08/05/20  1811   WBC 10*3/mm3 13.72* 16.03*   HEMOGLOBIN g/dL 11.9* 16.1   HEMATOCRIT % 37.4* 50.5   PLATELETS 10*3/mm3 174 243     Results from last 7 days   Lab Units 08/05/20  1811   INR  1.06       Culture Data:   No results found for: BLOODCX  No results found for: URINECX  No results found for: RESPCX  No results found for: WOUNDCX  No results found for: STOOLCX  No components found for: BODYFLD    Radiology Data:   Imaging Results (Last 24 Hours)     Procedure Component Value Units Date/Time    XR Chest 1 View [[id number]] Collected:  08/05/20 1813     Updated:  08/05/20 1831    Narrative:       [facility] [place]    Patient Name: PEBBLES WONG    ORDERING: MONO CR     ATTENDING: MONO CR     REFERRING: MONO CR    -----------------------    PROCEDURE: Chest Single View    TECHNIQUE: Single AP view of the chest    COMPARISON: None    HISTORY: Severe Sepsis triage protocol, A41.9 Sepsis, unspecified  organism R65.20 Severe sepsis without septic shock E11.10 Type 2  diabetes mellitus with ketoacidosis without coma    FINDINGS:     Life-support  devices: There is a dual lead left subclavian  pacemaking device present.    Lungs/pleura: Hazy patchy infiltrate suspected within the right  mid to lower lung zones centrally. No dense consolidation,  effusion, or pneumothorax.    Heart, hilar and mediastinal structures: The heart size and  mediastinal contours are within limits of normal. The trachea is  midline.    Skeletal Structures: No acute findings. No free air beneath the  diaphragm.      Impression:       Hazy infiltrative change right mid to lower lung zone.    Electronically signed by:  Anthony Boyce MD  8/5/2020 6:30 PM CDT  Workstation: [id number]          I have reviewed the patient's current medications.     Assessment/Plan   Combination of DKA and HHS     In the setting of diabetes mellitus type 2     Anion gap has closed around noon; no lab work done around 4 pm     He is on 3 units of regular insulin drip     Will discontinue every solution with potassium in it due to his renal failure    Acute kidney injury on chronic kidney disease     Patient stated that he was discharge from [facility] in the first days of July/20 and he was told to follow up with a nephrologist      Pending new labs to reevaluate; continue with current IVF     Nephrology consulted for am    Troponin elevated     Will do follow up troponin and consult cardiology am     No chest pain reported    Sepsis?     Being treated for PNA     Doubtful; will order procalcitonin today and tomorrow and check lactic acid later on     Patient denies respiratory symptoms before; empirically continue with current antibiotics and reevaluate am     Check x ray 2 views am    Discharge Planning: I expect patient to be discharged to home in 2 to 3 days    Trent Celeste MD

## 2020-08-06 NOTE — ED NOTES
Spoke with Dr. De Luna regarding pt's blood pressure of 77/50. He reports to keep an eye on BP. Continue LR fluid bolus and LR running rate. If pt continues to get lower then call provider back.     Pt moved to a hospital bed at this time and made as comfortable as possible.      Melba Mckinney RN  08/05/20 7282

## 2020-08-06 NOTE — ED NOTES
Patient presents to ED with chief complaint of chest pain, hyperglycemia, abdominal pain. Patient was given 324mg of aspirin via EMS and one nitro sublingual.      Twila Vee RN  08/05/20 7670

## 2020-08-06 NOTE — PAYOR COMM NOTE
"[facility]mani Matute RN[facility]  [phone] phone  [phone] fax  Clinical Information    Kenny Wong (45 y.o. Male)     Date of Birth Social Security Number Address Home Phone MRN    1975  [address]  [facility] [address] [phone] [id number]    [employer] Status          None        Admission Date Admission Type Admitting Provider Attending Provider Department, Room/Bed    8/5/20 Emergency Jed De Luna MD Kirchner, Quinn J, MD [facility] CRITICAL CARE STEPDOWN, 09/A    Discharge Date Discharge Disposition Discharge Destination                       Attending Provider:  Jed De Luna MD    Allergies:  Penicillins    Isolation:  None   Infection:  None   Code Status:  CPR    Ht:  188 cm (74\")   Wt:  136 kg (300 lb 3.2 oz)    Admission Cmt:  None   Principal Problem:  Diabetic ketoacidosis without coma (CMS/HCC) [E11.10]                 Active Insurance as of 8/5/2020     Primary Coverage     Payor Plan Insurance Group Employer/Plan Group    [employer] [facility]      Payor Plan Address Payor Plan Phone Number Payor Plan Fax Number Effective Dates    [address] [address]   3/1/2020 - None Entered    [place] [address]       Subscriber Name Subscriber Birth Date Member ID       KENNY WONG 1975 [id number]                 Emergency Contacts      (Rel.) Home Phone Work Phone Mobile Phone    Salina Wong (Spouse) [phone] -- [phone]               History & Physical      Jed De Luna MD at 08/05/20 2149                [facility] Admission      Date of Admission: 8/5/2020, 21:49      Primary Care Physician: Maninder Dixon MD      Chief Complaint: Nausea and vomiting    HPI: 45-year-old -American male with past medical history significant for obesity, hypertension, poorly controlled type 2 diabetes " mellitus who presents to the emergency department with nausea and vomiting.  The patient states that 2-year-old increasingly fatigued about 4 hours ago which progressed to nausea and vomiting within the past 24 hours.  The patient states that he has been compliant with his insulin regimen as ordered and he tries to follow a strict diabetic diet.  The patient denies fevers, chills but does endorse diaphoresis with nausea and vomiting.  He denies any recent sick contacts.  He denies any significant cough or shortness of breath.  He endorses some mild loose stools over the past 24 hours.  No abdominal pain present.  He denies any noticeable skin rash lesions or ulcers.    Concurrent Medical History:  has no past medical history on file.    Past Surgical History:  has no past surgical history on file.    Family History: Hypertension and diabetes    Social History: Endorses intermittent alcohol use and tobacco use.    Allergies:   Allergies   Allergen Reactions   • Penicillins Unknown - Low Severity       Medications:   Prior to Admission medications    Medication Sig Start Date End Date Taking? Authorizing Provider   amLODIPine (NORVASC) 10 MG tablet amlodipine 10 mg tablet    Beth Cho MD   aspirin 81 MG chewable tablet aspirin 81 mg chewable tablet    Beth Cho MD   carvedilol (COREG) 25 MG tablet carvedilol 25 mg tablet    Beth Cho MD   Ergocalciferol (VITAMIN D2 PO) Vitamin D2 1,250 mcg (50,000 unit) capsule    Beth Cho MD   hydroCHLOROthiazide (HYDRODIURIL) 25 MG tablet hydrochlorothiazide 25 mg tablet    Beth Cho MD   Insulin Glargine (BASAGLAR KWIKPEN SC) Basaglar KwikPen U-100 Insulin 100 unit/mL (3 mL) subcutaneous    ProviderBeth MD   isosorbide mononitrate (IMDUR) 30 MG 24 hr tablet isosorbide mononitrate ER 30 mg tablet,extended release 24 hr   Take 1 tablet every day by oral route.    Beth Cho MD   metFORMIN (GLUCOPHAGE)  1000 MG tablet metformin 1,000 mg tablet    Provider, MD Beth   methocarbamol (ROBAXIN) 500 MG tablet methocarbamol 500 mg tablet   Take 1 tablet every day by oral route.    Provider, MD Beth   raNITIdine (ZANTAC) 150 MG tablet ranitidine 150 mg tablet    Provider, MD Beth       Review of Systems:  Review of Systems   Constitutional: Positive for diaphoresis and fatigue. Negative for chills and fever.   Respiratory: Negative for cough, shortness of breath and wheezing.    Cardiovascular: Negative for chest pain, palpitations and leg swelling.   Gastrointestinal: Positive for diarrhea, nausea and vomiting. Negative for abdominal distention and abdominal pain.   Genitourinary: Negative for dysuria and hematuria.   Musculoskeletal: Negative for arthralgias and myalgias.   Skin: Negative for rash and wound.   Neurological: Positive for light-headedness. Negative for dizziness, syncope and headaches.      Otherwise complete ROS is negative except as mentioned above.    Physical Exam:   Temp:  [98.3 °F (36.8 °C)] 98.3 °F (36.8 °C)  Heart Rate:  [] 106  Resp:  [24] 24  BP: ()/(46-70) 100/64  Physical Exam   Constitutional: He is oriented to person, place, and time. No distress.   Very mildly somnolent and fatigued lying in bed   HENT:   Head: Normocephalic and atraumatic.   Mouth/Throat: No oropharyngeal exudate.   Mucous membranes appear dry.  No oral lesions are present.   Eyes: Pupils are equal, round, and reactive to light. Conjunctivae and EOM are normal.   Neck: Normal range of motion.   Cardiovascular: Normal rate, regular rhythm, normal heart sounds and intact distal pulses.   No murmur heard.  Pulmonary/Chest: Effort normal and breath sounds normal. He has no wheezes. He has no rales.   Reduced breath sounds diffusely likely secondary to body habitus.   Abdominal: Soft. Bowel sounds are normal. He exhibits no distension. There is no tenderness. There is no guarding.      Musculoskeletal: Normal range of motion. He exhibits no edema or tenderness.   Neurological: He is alert and oriented to person, place, and time. No cranial nerve deficit.   Skin: No rash noted. He is diaphoretic. No erythema.       Results Reviewed:  I have personally reviewed current lab, radiology, and data and agree with results.  Lab Results (last 24 hours)     Procedure Component Value Units Date/Time    Hemoglobin A1C - Miscellaneous Test [[id number]] Collected:  08/05/20 1811    Specimen:  Blood Updated:  08/05/20 2046    Blood Gas, Arterial [[id number]]  (Abnormal) Collected:  08/05/20 2027    Specimen:  Arterial Blood Updated:  08/05/20 2028     Site Right Radial     Lawrence's Test Positive     pH, Arterial 7.250 pH units      Comment: 84 Value below reference range        pCO2, Arterial 47.7 mm Hg      Comment: 83 Value above reference range        pO2, Arterial 99.1 mm Hg      HCO3, Arterial 20.9 mmol/L      Base Excess, Arterial -6.5 mmol/L      Comment: 84 Value below reference range        O2 Saturation, Arterial 97.3 %      Barometric Pressure for Blood Gas 748 mmHg      Modality Nasal Cannula     Flow Rate 1.5 lpm      Ventilator Mode NA     Collected by NA     Comment: Meter: [id number]     :  [id number]       Ethanol [[id number]] Collected:  08/05/20 1927    Specimen:  Blood from Arm, Right Updated:  08/05/20 2014     Ethanol <10 mg/dL      Ethanol % <0.010 %     COVID-19, [facility] IN-HOUSE, NP SWAB IN TRANSPORT MEDIA 8-10 HR TAT - Swab, Nasopharynx [[id number]] Collected:  08/05/20 2002    Specimen:  Swab from Nasopharynx Updated:  08/05/20 2002    Troponin [[id number]]  (Abnormal) Collected:  08/05/20 1927    Specimen:  Blood from Arm, Right Updated:  08/05/20 2001     Troponin T 0.313 ng/mL     Narrative:       Troponin T Reference Range:  <= 0.03 ng/mL-   Negative for AMI  >0.03 ng/mL-     Abnormal for myocardial necrosis.  Clinicians would have to utilize clinical acumen, EKG, Troponin  and serial changes to determine if it is an Acute Myocardial Infarction or myocardial injury due to an underlying chronic condition.       Results may be falsely decreased if patient taking Biotin.      Comprehensive Metabolic Panel [[id number]]  (Abnormal) Collected:  08/05/20 1927    Specimen:  Blood from Arm, Right Updated:  08/05/20 2001     Glucose 1,116 mg/dL      BUN 71 mg/dL      Creatinine 2.83 mg/dL      Sodium 140 mmol/L      Potassium 6.4 mmol/L      Chloride 102 mmol/L      CO2 20.0 mmol/L      Calcium 9.7 mg/dL      Total Protein 6.4 g/dL      Albumin 3.20 g/dL      ALT (SGPT) 18 U/L      AST (SGOT) 21 U/L      Alkaline Phosphatase 105 U/L      Total Bilirubin 0.2 mg/dL      eGFR  African Amer 29 mL/min/1.73      Globulin 3.2 gm/dL      A/G Ratio 1.0 g/dL      BUN/Creatinine Ratio 25.1     Anion Gap 18.0 mmol/L     Narrative:       GFR Normal >60  Chronic Kidney Disease <60  Kidney Failure <15      Phosphorus [[id number]]  (Abnormal) Collected:  08/05/20 1927    Specimen:  Blood from Arm, Right Updated:  08/05/20 1950     Phosphorus 5.7 mg/dL     Magnesium [[id number]]  (Normal) Collected:  08/05/20 1927    Specimen:  Blood from Arm, Right Updated:  08/05/20 1950     Magnesium 2.6 mg/dL     Osmolality, Serum [[id number]]  (Abnormal) Collected:  08/05/20 1927    Specimen:  Blood Updated:  08/05/20 1944     Osmolality 384 mOsm/kg     Ketone Bodies, Serum (Not performed at [facility]) [[id number]] Collected:  08/05/20 1927    Specimen:  Blood Updated:  08/05/20 1939    Narrative:       The following orders were created for panel order Ketone Bodies, Serum (Not performed at [facility]).  Procedure                               Abnormality         Status                     ---------                               -----------         ------                     Acetone[[id number]]                      Abnormal            Final result                 Please view results for these tests on the individual orders.     Acetone [[id number]]  (Abnormal) Collected:  08/05/20 1927    Specimen:  Blood Updated:  08/05/20 1939     Acetone Moderate    Blood Culture - Blood, Wrist, Left [[id number]] Collected:  08/05/20 1927    Specimen:  Blood from Wrist, Left Updated:  08/05/20 1928    [place] Draw [[id number]] Collected:  08/05/20 1811    Specimen:  Blood from Arm, Right Updated:  08/05/20 1915    Narrative:       The following orders were created for panel order [place] Draw.  Procedure                               Abnormality         Status                     ---------                               -----------         ------                     Light Blue Top[[id number]]                                   Final result               Green Top (Gel)[[id number]]                                  Final result               Lavender Top[[id number]]                                     Final result               Gold Top - SST[[id number]]                                                                Please view results for these tests on the individual orders.    Light Blue Top [[id number]] Collected:  08/05/20 1811    Specimen:  Blood from Arm, Right Updated:  08/05/20 1915     Extra Tube hold for add-on     Comment: Auto resulted       Green Top (Gel) [[id number]] Collected:  08/05/20 1811    Specimen:  Blood from Arm, Right Updated:  08/05/20 1915     Extra Tube Hold for add-ons.     Comment: Auto resulted.       Lavender Top [[id number]] Collected:  08/05/20 1811    Specimen:  Blood from Arm, Right Updated:  08/05/20 1915     Extra Tube hold for add-on     Comment: Auto resulted       Magnesium [[id number]]  (Abnormal) Collected:  08/05/20 1811    Specimen:  Blood from Arm, Right Updated:  08/05/20 1836     Magnesium 3.2 mg/dL     Phosphorus [[id number]]  (Abnormal) Collected:  08/05/20 1811    Specimen:  Blood from Arm, Right Updated:  08/05/20 1836     Phosphorus 6.7 mg/dL     C-reactive Protein [[id number]]  (Abnormal) Collected:  08/05/20 1811     Specimen:  Blood from Arm, Right Updated:  08/05/20 1836     C-Reactive Protein 0.87 mg/dL     Protime-INR [[id number]]  (Normal) Collected:  08/05/20 1811    Specimen:  Blood from Arm, Right Updated:  08/05/20 1833     Protime 14.3 Seconds      INR 1.06    Narrative:       Therapeutic range for most indications is 2.0-3.0 INR,  or 2.5-3.5 for mechanical heart valves.    aPTT [[id number]]  (Normal) Collected:  08/05/20 1811    Specimen:  Blood from Arm, Right Updated:  08/05/20 1833     PTT 24.7 seconds     Narrative:       The recommended Heparin therapeutic range is 68-97 seconds.    Lactic Acid, Plasma [[id number]]  (Abnormal) Collected:  08/05/20 1811    Specimen:  Blood from Arm, Right Updated:  08/05/20 1828     Lactate 3.5 mmol/L     Lactic Acid, Reflex Timer (This will reflex a repeat order 3-3:15 hours after ordered.) [[id number]] Collected:  08/05/20 1811    Specimen:  Blood from Arm, Right Updated:  08/05/20 1828    Calcium, Ionized [[id number]]  (Normal) Collected:  08/05/20 1811    Specimen:  Blood from Arm, Right Updated:  08/05/20 1816     Ionized Calcium 5.24 mg/dL     CBC & Differential [[id number]] Collected:  08/05/20 1811    Specimen:  Blood from Arm, Right Updated:  08/05/20 1815    Narrative:       The following orders were created for panel order CBC & Differential.  Procedure                               Abnormality         Status                     ---------                               -----------         ------                     CBC Auto Differential[[id number]]        Abnormal            Final result                 Please view results for these tests on the individual orders.    CBC Auto Differential [[id number]]  (Abnormal) Collected:  08/05/20 1811    Specimen:  Blood from Arm, Right Updated:  08/05/20 1815     WBC 16.03 10*3/mm3      RBC 5.81 10*6/mm3      Hemoglobin 16.1 g/dL      Hematocrit 50.5 %      MCV 86.9 fL      MCH 27.7 pg      MCHC 31.9 g/dL      RDW 14.1 %      RDW-SD 44.1  fl      MPV 12.0 fL      Platelets 243 10*3/mm3      Neutrophil % 82.7 %      Lymphocyte % 10.0 %      Monocyte % 6.5 %      Eosinophil % 0.0 %      Basophil % 0.4 %      Immature Grans % 0.4 %      Neutrophils, Absolute 13.26 10*3/mm3      Lymphocytes, Absolute 1.61 10*3/mm3      Monocytes, Absolute 1.04 10*3/mm3      Eosinophils, Absolute 0.00 10*3/mm3      Basophils, Absolute 0.06 10*3/mm3      Immature Grans, Absolute 0.06 10*3/mm3      nRBC 0.0 /100 WBC     Blood Culture - Blood, Arm, Right [[id number]] Collected:  08/05/20 1811    Specimen:  Blood from Arm, Right Updated:  08/05/20 1811        Imaging Results (Last 24 Hours)     Procedure Component Value Units Date/Time    XR Chest 1 View [[id number]] Collected:  08/05/20 1813     Updated:  08/05/20 1831    Narrative:       [facility]    Patient Name: PEBBLES WONG    ORDERING: MONO CR     ATTENDING: MONO CR     REFERRING: MONO CR    -----------------------    PROCEDURE: Chest Single View    TECHNIQUE: Single AP view of the chest    COMPARISON: None    HISTORY: Severe Sepsis triage protocol, A41.9 Sepsis, unspecified  organism R65.20 Severe sepsis without septic shock E11.10 Type 2  diabetes mellitus with ketoacidosis without coma    FINDINGS:     Life-support devices: There is a dual lead left subclavian  pacemaking device present.    Lungs/pleura: Hazy patchy infiltrate suspected within the right  mid to lower lung zones centrally. No dense consolidation,  effusion, or pneumothorax.    Heart, hilar and mediastinal structures: The heart size and  mediastinal contours are within limits of normal. The trachea is  midline.    Skeletal Structures: No acute findings. No free air beneath the  diaphragm.      Impression:       Hazy infiltrative change right mid to lower lung zone.    Electronically signed by:  Anthony Boyce MD  8/5/2020 6:30 PM CDT  Workstation: [id number]            Assessment:    Active [facility]  Problems    Diagnosis   • **Diabetic ketoacidosis without coma (CMS/HCC)   • Severe sepsis (CMS/HCC)   • Essential hypertension   • Elevated troponin   • Community acquired pneumonia of right middle lobe of lung           Plan:    Diabetic ketoacidosis.  Patient with poorly controlled type 2 diabetes mellitus with concern for medication noncompliance despite patient reports.  A1c unable to be resulted (being sent to reference lab).  Underlying cause for DKA potentially gastroenteritis versus possible community-acquired pneumonia.  Serum pH is 7.250 with a glucose of 1116 and serum potassium of 6.4.  Anion gap of 18 and serum bicarbonate of 20.  We will initiate DKA insulin drip protocol and continue with aggressive volume resuscitation.  Monitoring basic metabolic panel every 4 hours.  Repeat ABG at midnight.  Nutrition consult and diabetic education consult ordered.    Concern for community-acquired pneumonia.  Chest x-ray revealing a hazy density within the right middle lobe.  While there are minimal respiratory symptoms present we will proceed with treatment for possible community-acquired pneumonia.  Coronavirus testing is pending.  Ceftriaxone and azithromycin ordered.    Elevated troponin.  This is likely secondary to stress of underlying DKA.  We will trend troponins for the next 12 hours.  EKG is negative for changes of ACS.  The patient is without chest pain.    Hypertension.  Patient was initially hypotensive upon presentation but has responded well to fluids.  We will hold off on antihypertensive agents this evening and resume tomorrow as her blood pressure normalizes.      Jed De Luna MD  Hospitalist Service                  Electronically signed by Jed De Luna MD at 08/05/20 5704       Physician Progress Notes (last 24 hours) (Notes from 08/05/20 1543 through 08/06/20 1543)    No notes of this type exist for this encounter.         Consult Notes (last 24 hours) (Notes from 08/05/20 6223  through 08/06/20 7003)    No notes of this type exist for this encounter.

## 2020-08-06 NOTE — ED NOTES
Lab reports they are not able to run the Hgb A1C. States that it has been ran five times. They are sending out to another facility. Provider made aware.      Melba Mckinney RN  08/05/20 2040

## 2020-08-06 NOTE — H&P
[facility] Medicine Admission      Date of Admission: 8/5/2020, 21:49      Primary Care Physician: Maninder Dixon MD      Chief Complaint: Nausea and vomiting    HPI: 45-year-old -American male with past medical history significant for obesity, hypertension, poorly controlled type 2 diabetes mellitus who presents to the emergency department with nausea and vomiting.  The patient states that 2-year-old increasingly fatigued about 4 hours ago which progressed to nausea and vomiting within the past 24 hours.  The patient states that he has been compliant with his insulin regimen as ordered and he tries to follow a strict diabetic diet.  The patient denies fevers, chills but does endorse diaphoresis with nausea and vomiting.  He denies any recent sick contacts.  He denies any significant cough or shortness of breath.  He endorses some mild loose stools over the past 24 hours.  No abdominal pain present.  He denies any noticeable skin rash lesions or ulcers.    Concurrent Medical History:  has no past medical history on file.    Past Surgical History:  has no past surgical history on file.    Family History: Hypertension and diabetes    Social History: Endorses intermittent alcohol use and tobacco use.    Allergies:   Allergies   Allergen Reactions   • Penicillins Unknown - Low Severity       Medications:   Prior to Admission medications    Medication Sig Start Date End Date Taking? Authorizing Provider   amLODIPine (NORVASC) 10 MG tablet amlodipine 10 mg tablet    Beth Cho MD   aspirin 81 MG chewable tablet aspirin 81 mg chewable tablet    Beth Cho MD   carvedilol (COREG) 25 MG tablet carvedilol 25 mg tablet    Beth Cho MD   Ergocalciferol (VITAMIN D2 PO) Vitamin D2 1,250 mcg (50,000 unit) capsule    Beth Cho MD   hydroCHLOROthiazide (HYDRODIURIL) 25 MG tablet hydrochlorothiazide 25 mg tablet    Provider  MD Beth   Insulin Glargine (BASAGLAR KWIKPEN SC) Basaglar KwikPen U-100 Insulin 100 unit/mL (3 mL) subcutaneous    ProviderBeth MD   isosorbide mononitrate (IMDUR) 30 MG 24 hr tablet isosorbide mononitrate ER 30 mg tablet,extended release 24 hr   Take 1 tablet every day by oral route.    Beth Cho MD   metFORMIN (GLUCOPHAGE) 1000 MG tablet metformin 1,000 mg tablet    Beth Cho MD   methocarbamol (ROBAXIN) 500 MG tablet methocarbamol 500 mg tablet   Take 1 tablet every day by oral route.    Beth Cho MD   raNITIdine (ZANTAC) 150 MG tablet ranitidine 150 mg tablet    ProviderBeth MD       Review of Systems:  Review of Systems   Constitutional: Positive for diaphoresis and fatigue. Negative for chills and fever.   Respiratory: Negative for cough, shortness of breath and wheezing.    Cardiovascular: Negative for chest pain, palpitations and leg swelling.   Gastrointestinal: Positive for diarrhea, nausea and vomiting. Negative for abdominal distention and abdominal pain.   Genitourinary: Negative for dysuria and hematuria.   Musculoskeletal: Negative for arthralgias and myalgias.   Skin: Negative for rash and wound.   Neurological: Positive for light-headedness. Negative for dizziness, syncope and headaches.      Otherwise complete ROS is negative except as mentioned above.    Physical Exam:   Temp:  [98.3 °F (36.8 °C)] 98.3 °F (36.8 °C)  Heart Rate:  [] 106  Resp:  [24] 24  BP: ()/(46-70) 100/64  Physical Exam   Constitutional: He is oriented to person, place, and time. No distress.   Very mildly somnolent and fatigued lying in bed   HENT:   Head: Normocephalic and atraumatic.   Mouth/Throat: No oropharyngeal exudate.   Mucous membranes appear dry.  No oral lesions are present.   Eyes: Pupils are equal, round, and reactive to light. Conjunctivae and EOM are normal.   Neck: Normal range of motion.   Cardiovascular: Normal rate, regular rhythm,  normal heart sounds and intact distal pulses.   No murmur heard.  Pulmonary/Chest: Effort normal and breath sounds normal. He has no wheezes. He has no rales.   Reduced breath sounds diffusely likely secondary to body habitus.   Abdominal: Soft. Bowel sounds are normal. He exhibits no distension. There is no tenderness. There is no guarding.   Musculoskeletal: Normal range of motion. He exhibits no edema or tenderness.   Neurological: He is alert and oriented to person, place, and time. No cranial nerve deficit.   Skin: No rash noted. He is diaphoretic. No erythema.       Results Reviewed:  I have personally reviewed current lab, radiology, and data and agree with results.  Lab Results (last 24 hours)     Procedure Component Value Units Date/Time    Hemoglobin A1C - Miscellaneous Test [[id number]] Collected:  08/05/20 1811    Specimen:  Blood Updated:  08/05/20 2046    Blood Gas, Arterial [[id number]]  (Abnormal) Collected:  08/05/20 2027    Specimen:  Arterial Blood Updated:  08/05/20 2028     Site Right Radial     Lawrence's Test Positive     pH, Arterial 7.250 pH units      Comment: 84 Value below reference range        pCO2, Arterial 47.7 mm Hg      Comment: 83 Value above reference range        pO2, Arterial 99.1 mm Hg      HCO3, Arterial 20.9 mmol/L      Base Excess, Arterial -6.5 mmol/L      Comment: 84 Value below reference range        O2 Saturation, Arterial 97.3 %      Barometric Pressure for Blood Gas 748 mmHg      Modality Nasal Cannula     Flow Rate 1.5 lpm      Ventilator Mode NA     Collected by JORGE     Comment: Meter: [id number]     :  [id number]       Ethanol [[id number]] Collected:  08/05/20 1927    Specimen:  Blood from Arm, Right Updated:  08/05/20 2014     Ethanol <10 mg/dL      Ethanol % <0.010 %     COVID-19,  [facility], NP SWAB IN TRANSPORT MEDIA 8-10 HR TAT - Swab, Nasopharynx [[id number]] Collected:  08/05/20 2002    Specimen:  Swab from Nasopharynx Updated:  08/05/20 2002     Troponin [[id number]]  (Abnormal) Collected:  08/05/20 1927    Specimen:  Blood from Arm, Right Updated:  08/05/20 2001     Troponin T 0.313 ng/mL     Narrative:       Troponin T Reference Range:  <= 0.03 ng/mL-   Negative for AMI  >0.03 ng/mL-     Abnormal for myocardial necrosis.  Clinicians would have to utilize clinical acumen, EKG, Troponin and serial changes to determine if it is an Acute Myocardial Infarction or myocardial injury due to an underlying chronic condition.       Results may be falsely decreased if patient taking Biotin.      Comprehensive Metabolic Panel [[id number]]  (Abnormal) Collected:  08/05/20 1927    Specimen:  Blood from Arm, Right Updated:  08/05/20 2001     Glucose 1,116 mg/dL      BUN 71 mg/dL      Creatinine 2.83 mg/dL      Sodium 140 mmol/L      Potassium 6.4 mmol/L      Chloride 102 mmol/L      CO2 20.0 mmol/L      Calcium 9.7 mg/dL      Total Protein 6.4 g/dL      Albumin 3.20 g/dL      ALT (SGPT) 18 U/L      AST (SGOT) 21 U/L      Alkaline Phosphatase 105 U/L      Total Bilirubin 0.2 mg/dL      eGFR  African Amer 29 mL/min/1.73      Globulin 3.2 gm/dL      A/G Ratio 1.0 g/dL      BUN/Creatinine Ratio 25.1     Anion Gap 18.0 mmol/L     Narrative:       GFR Normal >60  Chronic Kidney Disease <60  Kidney Failure <15      Phosphorus [[id number]]  (Abnormal) Collected:  08/05/20 1927    Specimen:  Blood from Arm, Right Updated:  08/05/20 1950     Phosphorus 5.7 mg/dL     Magnesium [[id number]]  (Normal) Collected:  08/05/20 1927    Specimen:  Blood from Arm, Right Updated:  08/05/20 1950     Magnesium 2.6 mg/dL     Osmolality, Serum [[id number]]  (Abnormal) Collected:  08/05/20 1927    Specimen:  Blood Updated:  08/05/20 1944     Osmolality 384 mOsm/kg     Ketone Bodies, Serum (Not performed at [facility]) [[id number]] Collected:  08/05/20 1927    Specimen:  Blood Updated:  08/05/20 1939    Narrative:       The following orders were created for panel order Ketone Bodies, Serum (Not performed  at [facility]).  Procedure                               Abnormality         Status                     ---------                               -----------         ------                     Acetone[[id number]]                      Abnormal            Final result                 Please view results for these tests on the individual orders.    Acetone [[id number]]  (Abnormal) Collected:  08/05/20 1927    Specimen:  Blood Updated:  08/05/20 1939     Acetone Moderate    Blood Culture - Blood, Wrist, Left [[id number]] Collected:  08/05/20 1927    Specimen:  Blood from Wrist, Left Updated:  08/05/20 1928    Severance Draw [[id number]] Collected:  08/05/20 1811    Specimen:  Blood from Arm, Right Updated:  08/05/20 1915    Narrative:       The following orders were created for panel order [place] Draw.  Procedure                               Abnormality         Status                     ---------                               -----------         ------                     Light Blue Top[[id number]]                                   Final result               Green Top (Gel)[[id number]]                                  Final result               Lavender Top[[id number]]                                     Final result               Gold Top - SST[[id number]]                                                                Please view results for these tests on the individual orders.    Light Blue Top [[id number]] Collected:  08/05/20 1811    Specimen:  Blood from Arm, Right Updated:  08/05/20 1915     Extra Tube hold for add-on     Comment: Auto resulted       Green Top (Gel) [[id number]] Collected:  08/05/20 1811    Specimen:  Blood from Arm, Right Updated:  08/05/20 1915     Extra Tube Hold for add-ons.     Comment: Auto resulted.       Lavender Top [[id number]] Collected:  08/05/20 1811    Specimen:  Blood from Arm, Right Updated:  08/05/20 1915     Extra Tube hold for add-on     Comment: Auto resulted       Magnesium [[id number]]   (Abnormal) Collected:  08/05/20 1811    Specimen:  Blood from Arm, Right Updated:  08/05/20 1836     Magnesium 3.2 mg/dL     Phosphorus [[id number]]  (Abnormal) Collected:  08/05/20 1811    Specimen:  Blood from Arm, Right Updated:  08/05/20 1836     Phosphorus 6.7 mg/dL     C-reactive Protein [[id number]]  (Abnormal) Collected:  08/05/20 1811    Specimen:  Blood from Arm, Right Updated:  08/05/20 1836     C-Reactive Protein 0.87 mg/dL     Protime-INR [[id number]]  (Normal) Collected:  08/05/20 1811    Specimen:  Blood from Arm, Right Updated:  08/05/20 1833     Protime 14.3 Seconds      INR 1.06    Narrative:       Therapeutic range for most indications is 2.0-3.0 INR,  or 2.5-3.5 for mechanical heart valves.    aPTT [[id number]]  (Normal) Collected:  08/05/20 1811    Specimen:  Blood from Arm, Right Updated:  08/05/20 1833     PTT 24.7 seconds     Narrative:       The recommended Heparin therapeutic range is 68-97 seconds.    Lactic Acid, Plasma [[id number]]  (Abnormal) Collected:  08/05/20 1811    Specimen:  Blood from Arm, Right Updated:  08/05/20 1828     Lactate 3.5 mmol/L     Lactic Acid, Reflex Timer (This will reflex a repeat order 3-3:15 hours after ordered.) [[id number]] Collected:  08/05/20 1811    Specimen:  Blood from Arm, Right Updated:  08/05/20 1828    Calcium, Ionized [[id number]]  (Normal) Collected:  08/05/20 1811    Specimen:  Blood from Arm, Right Updated:  08/05/20 1816     Ionized Calcium 5.24 mg/dL     CBC & Differential [[id number]] Collected:  08/05/20 1811    Specimen:  Blood from Arm, Right Updated:  08/05/20 1815    Narrative:       The following orders were created for panel order CBC & Differential.  Procedure                               Abnormality         Status                     ---------                               -----------         ------                     CBC Auto Differential[[id number]]        Abnormal            Final result                 Please view results for these  tests on the individual orders.    CBC Auto Differential [[id number]]  (Abnormal) Collected:  08/05/20 1811    Specimen:  Blood from Arm, Right Updated:  08/05/20 1815     WBC 16.03 10*3/mm3      RBC 5.81 10*6/mm3      Hemoglobin 16.1 g/dL      Hematocrit 50.5 %      MCV 86.9 fL      MCH 27.7 pg      MCHC 31.9 g/dL      RDW 14.1 %      RDW-SD 44.1 fl      MPV 12.0 fL      Platelets 243 10*3/mm3      Neutrophil % 82.7 %      Lymphocyte % 10.0 %      Monocyte % 6.5 %      Eosinophil % 0.0 %      Basophil % 0.4 %      Immature Grans % 0.4 %      Neutrophils, Absolute 13.26 10*3/mm3      Lymphocytes, Absolute 1.61 10*3/mm3      Monocytes, Absolute 1.04 10*3/mm3      Eosinophils, Absolute 0.00 10*3/mm3      Basophils, Absolute 0.06 10*3/mm3      Immature Grans, Absolute 0.06 10*3/mm3      nRBC 0.0 /100 WBC     Blood Culture - Blood, Arm, Right [[id number]] Collected:  08/05/20 1811    Specimen:  Blood from Arm, Right Updated:  08/05/20 1811        Imaging Results (Last 24 Hours)     Procedure Component Value Units Date/Time    XR Chest 1 View [[id number]] Collected:  08/05/20 1813     Updated:  08/05/20 1831    Narrative:       [facility] [place]    Patient Name: PEBBLES WONG    ORDERING: MONO CR     ATTENDING: MONO CR     REFERRING: MONO CR    -----------------------    PROCEDURE: Chest Single View    TECHNIQUE: Single AP view of the chest    COMPARISON: None    HISTORY: Severe Sepsis triage protocol, A41.9 Sepsis, unspecified  organism R65.20 Severe sepsis without septic shock E11.10 Type 2  diabetes mellitus with ketoacidosis without coma    FINDINGS:     Life-support devices: There is a dual lead left subclavian  pacemaking device present.    Lungs/pleura: Hazy patchy infiltrate suspected within the right  mid to lower lung zones centrally. No dense consolidation,  effusion, or pneumothorax.    Heart, hilar and mediastinal structures: The heart size and  mediastinal  contours are within limits of normal. The trachea is  midline.    Skeletal Structures: No acute findings. No free air beneath the  diaphragm.      Impression:       Hazy infiltrative change right mid to lower lung zone.    Electronically signed by:  Anthony Boyce MD  8/5/2020 6:30 PM CDT  Workstation: [id number]            Assessment:    Active Hospital Problems    Diagnosis   • **Diabetic ketoacidosis without coma (CMS/HCC)   • Severe sepsis (CMS/HCC)   • Essential hypertension   • Elevated troponin   • Community acquired pneumonia of right middle lobe of lung           Plan:    Diabetic ketoacidosis.  Patient with poorly controlled type 2 diabetes mellitus with concern for medication noncompliance despite patient reports.  A1c unable to be resulted (being sent to reference lab).  Underlying cause for DKA potentially gastroenteritis versus possible community-acquired pneumonia.  Serum pH is 7.250 with a glucose of 1116 and serum potassium of 6.4.  Anion gap of 18 and serum bicarbonate of 20.  We will initiate DKA insulin drip protocol and continue with aggressive volume resuscitation.  Monitoring basic metabolic panel every 4 hours.  Repeat ABG at midnight.  Nutrition consult and diabetic education consult ordered.    Concern for community-acquired pneumonia.  Chest x-ray revealing a hazy density within the right middle lobe.  While there are minimal respiratory symptoms present we will proceed with treatment for possible community-acquired pneumonia.  Coronavirus testing is pending.  Ceftriaxone and azithromycin ordered.    Elevated troponin.  This is likely secondary to stress of underlying DKA.  We will trend troponins for the next 12 hours.  EKG is negative for changes of ACS.  The patient is without chest pain.    Hypertension.  Patient was initially hypotensive upon presentation but has responded well to fluids.  We will hold off on antihypertensive agents this evening and resume tomorrow as her blood pressure  normalizes.      Jed De Luna MD  Hospitalist Service

## 2020-08-06 NOTE — PLAN OF CARE
Problem: Patient Care Overview  Goal: Plan of Care Review  Outcome: Ongoing (interventions implemented as appropriate)  Flowsheets (Taken 8/6/2020 6764)  Progress: no change  Plan of Care Reviewed With: patient  Outcome Summary: pt new to unit. pt tolerating treatment.

## 2020-08-06 NOTE — PAYOR COMM NOTE
"  Request for inpatient admission  Trina Montes De Oca RN, [facility]  [phone] phone  [phone] fax            Kenny Wong (45 y.o. Male)     Date of Birth Social Security Number Address Home Phone MRN    1975  [address]  [place] [id number] [phone] [id number]    Holiness Marital Status          None        Admission Date Admission Type Admitting Provider Attending Provider Department, Room/Bed    8/5/20 Emergency Jed De Luna MD Kirchner, Quinn J, MD [facility] Emergency Department, 14/14    Discharge Date Discharge Disposition Discharge Destination                       Attending Provider:  Jed De Luna MD    Allergies:  Penicillins    Isolation:  None   Infection:  COVID (rule out) (08/05/20)   Code Status:  CPR    Ht:  188 cm (74\")   Wt:  136 kg (300 lb 3.2 oz)    Admission Cmt:  None   Principal Problem:  Diabetic ketoacidosis without coma (CMS/HCC) [E11.10]                 Active Insurance as of 8/5/2020     Primary Coverage     Payor Plan Insurance Group Employer/Plan Group    [employer] [facility] [employer] [place]      Payor Plan Address Payor Plan Phone Number Payor Plan Fax Number Effective Dates    [address] [address]   3/1/2020 - None Entered    [place] [address]       Subscriber Name Subscriber Birth Date Member ID       KENNY WONG 1975 [id number]                 Emergency Contacts      (Rel.) Home Phone Work Phone Mobile Phone    Salina Wong (Spouse) [phone] -- [phone]               History & Physical      Jed De Luna MD at 08/05/20 2149                [facility] Medicine Admission      Date of Admission: 8/5/2020, 21:49      Primary Care Physician: Maninder Dixon MD      Chief Complaint: Nausea and vomiting    HPI: 45-year-old -American male with past medical history significant for obesity, hypertension, poorly controlled type " 2 diabetes mellitus who presents to the emergency department with nausea and vomiting.  The patient states that 2-year-old increasingly fatigued about 4 hours ago which progressed to nausea and vomiting within the past 24 hours.  The patient states that he has been compliant with his insulin regimen as ordered and he tries to follow a strict diabetic diet.  The patient denies fevers, chills but does endorse diaphoresis with nausea and vomiting.  He denies any recent sick contacts.  He denies any significant cough or shortness of breath.  He endorses some mild loose stools over the past 24 hours.  No abdominal pain present.  He denies any noticeable skin rash lesions or ulcers.    Concurrent Medical History:  has no past medical history on file.    Past Surgical History:  has no past surgical history on file.    Family History: Hypertension and diabetes    Social History: Endorses intermittent alcohol use and tobacco use.    Allergies:   Allergies   Allergen Reactions   • Penicillins Unknown - Low Severity       Medications:   Prior to Admission medications    Medication Sig Start Date End Date Taking? Authorizing Provider   amLODIPine (NORVASC) 10 MG tablet amlodipine 10 mg tablet    Beth Cho MD   aspirin 81 MG chewable tablet aspirin 81 mg chewable tablet    Beth Cho MD   carvedilol (COREG) 25 MG tablet carvedilol 25 mg tablet    Beth Cho MD   Ergocalciferol (VITAMIN D2 PO) Vitamin D2 1,250 mcg (50,000 unit) capsule    Beth Cho MD   hydroCHLOROthiazide (HYDRODIURIL) 25 MG tablet hydrochlorothiazide 25 mg tablet    Beth Cho MD   Insulin Glargine (BASAGLAR KWIKPEN SC) Basaglar KwikPen U-100 Insulin 100 unit/mL (3 mL) subcutaneous    Beth Cho MD   isosorbide mononitrate (IMDUR) 30 MG 24 hr tablet isosorbide mononitrate ER 30 mg tablet,extended release 24 hr   Take 1 tablet every day by oral route.    Beth Cho MD   metFORMIN  (GLUCOPHAGE) 1000 MG tablet metformin 1,000 mg tablet    Provider, MD Beth   methocarbamol (ROBAXIN) 500 MG tablet methocarbamol 500 mg tablet   Take 1 tablet every day by oral route.    Provider, MD Beth   raNITIdine (ZANTAC) 150 MG tablet ranitidine 150 mg tablet    Provider, MD Beth       Review of Systems:  Review of Systems   Constitutional: Positive for diaphoresis and fatigue. Negative for chills and fever.   Respiratory: Negative for cough, shortness of breath and wheezing.    Cardiovascular: Negative for chest pain, palpitations and leg swelling.   Gastrointestinal: Positive for diarrhea, nausea and vomiting. Negative for abdominal distention and abdominal pain.   Genitourinary: Negative for dysuria and hematuria.   Musculoskeletal: Negative for arthralgias and myalgias.   Skin: Negative for rash and wound.   Neurological: Positive for light-headedness. Negative for dizziness, syncope and headaches.      Otherwise complete ROS is negative except as mentioned above.    Physical Exam:   Temp:  [98.3 °F (36.8 °C)] 98.3 °F (36.8 °C)  Heart Rate:  [] 106  Resp:  [24] 24  BP: ()/(46-70) 100/64  Physical Exam   Constitutional: He is oriented to person, place, and time. No distress.   Very mildly somnolent and fatigued lying in bed   HENT:   Head: Normocephalic and atraumatic.   Mouth/Throat: No oropharyngeal exudate.   Mucous membranes appear dry.  No oral lesions are present.   Eyes: Pupils are equal, round, and reactive to light. Conjunctivae and EOM are normal.   Neck: Normal range of motion.   Cardiovascular: Normal rate, regular rhythm, normal heart sounds and intact distal pulses.   No murmur heard.  Pulmonary/Chest: Effort normal and breath sounds normal. He has no wheezes. He has no rales.   Reduced breath sounds diffusely likely secondary to body habitus.   Abdominal: Soft. Bowel sounds are normal. He exhibits no distension. There is no tenderness. There is no guarding.      Musculoskeletal: Normal range of motion. He exhibits no edema or tenderness.   Neurological: He is alert and oriented to person, place, and time. No cranial nerve deficit.   Skin: No rash noted. He is diaphoretic. No erythema.       Results Reviewed:  I have personally reviewed current lab, radiology, and data and agree with results.  Lab Results (last 24 hours)     Procedure Component Value Units Date/Time    Hemoglobin A1C - Miscellaneous Test [[id number]] Collected:  08/05/20 1811    Specimen:  Blood Updated:  08/05/20 2046    Blood Gas, Arterial [[id number]]  (Abnormal) Collected:  08/05/20 2027    Specimen:  Arterial Blood Updated:  08/05/20 2028     Site Right Radial     Lawrence's Test Positive     pH, Arterial 7.250 pH units      Comment: 84 Value below reference range        pCO2, Arterial 47.7 mm Hg      Comment: 83 Value above reference range        pO2, Arterial 99.1 mm Hg      HCO3, Arterial 20.9 mmol/L      Base Excess, Arterial -6.5 mmol/L      Comment: 84 Value below reference range        O2 Saturation, Arterial 97.3 %      Barometric Pressure for Blood Gas 748 mmHg      Modality Nasal Cannula     Flow Rate 1.5 lpm      Ventilator Mode NA     Collected by NA     Comment: Meter: [id number]     :  [id number]       Ethanol [[id number]] Collected:  08/05/20 1927    Specimen:  Blood from Arm, Right Updated:  08/05/20 2014     Ethanol <10 mg/dL      Ethanol % <0.010 %     COVID-19, [facility] IN-HOUSE, NP SWAB IN TRANSPORT MEDIA 8-10 HR TAT - Swab, Nasopharynx [[id number]] Collected:  08/05/20 2002    Specimen:  Swab from Nasopharynx Updated:  08/05/20 2002    Troponin [[id number]]  (Abnormal) Collected:  08/05/20 1927    Specimen:  Blood from Arm, Right Updated:  08/05/20 2001     Troponin T 0.313 ng/mL     Narrative:       Troponin T Reference Range:  <= 0.03 ng/mL-   Negative for AMI  >0.03 ng/mL-     Abnormal for myocardial necrosis.  Clinicians would have to utilize clinical acumen, EKG, Troponin  and serial changes to determine if it is an Acute Myocardial Infarction or myocardial injury due to an underlying chronic condition.       Results may be falsely decreased if patient taking Biotin.      Comprehensive Metabolic Panel [[id number]]  (Abnormal) Collected:  08/05/20 1927    Specimen:  Blood from Arm, Right Updated:  08/05/20 2001     Glucose 1,116 mg/dL      BUN 71 mg/dL      Creatinine 2.83 mg/dL      Sodium 140 mmol/L      Potassium 6.4 mmol/L      Chloride 102 mmol/L      CO2 20.0 mmol/L      Calcium 9.7 mg/dL      Total Protein 6.4 g/dL      Albumin 3.20 g/dL      ALT (SGPT) 18 U/L      AST (SGOT) 21 U/L      Alkaline Phosphatase 105 U/L      Total Bilirubin 0.2 mg/dL      eGFR  African Amer 29 mL/min/1.73      Globulin 3.2 gm/dL      A/G Ratio 1.0 g/dL      BUN/Creatinine Ratio 25.1     Anion Gap 18.0 mmol/L     Narrative:       GFR Normal >60  Chronic Kidney Disease <60  Kidney Failure <15      Phosphorus [[id number]]  (Abnormal) Collected:  08/05/20 1927    Specimen:  Blood from Arm, Right Updated:  08/05/20 1950     Phosphorus 5.7 mg/dL     Magnesium [[id number]]  (Normal) Collected:  08/05/20 1927    Specimen:  Blood from Arm, Right Updated:  08/05/20 1950     Magnesium 2.6 mg/dL     Osmolality, Serum [[id number]]  (Abnormal) Collected:  08/05/20 1927    Specimen:  Blood Updated:  08/05/20 1944     Osmolality 384 mOsm/kg     Ketone Bodies, Serum (Not performed at [place]) [[id number]] Collected:  08/05/20 1927    Specimen:  Blood Updated:  08/05/20 1939    Narrative:       The following orders were created for panel order Ketone Bodies, Serum (Not performed at [place]).  Procedure                               Abnormality         Status                     ---------                               -----------         ------                     Acetone[[id number]]                      Abnormal            Final result                 Please view results for these tests on the individual orders.     Acetone [[id number]]  (Abnormal) Collected:  08/05/20 1927    Specimen:  Blood Updated:  08/05/20 1939     Acetone Moderate    Blood Culture - Blood, Wrist, Left [[id number]] Collected:  08/05/20 1927    Specimen:  Blood from Wrist, Left Updated:  08/05/20 1928    [place] Draw [[id number]] Collected:  08/05/20 1811    Specimen:  Blood from Arm, Right Updated:  08/05/20 1915    Narrative:       The following orders were created for panel order [place] Draw.  Procedure                               Abnormality         Status                     ---------                               -----------         ------                     Light Blue Top[[id number]]                                   Final result               Green Top (Gel)[[id number]]                                  Final result               Lavender Top[[id number]]                                     Final result               Gold Top - SST[[id number]]                                                                Please view results for these tests on the individual orders.    Light Blue Top [[id number]] Collected:  08/05/20 1811    Specimen:  Blood from Arm, Right Updated:  08/05/20 1915     Extra Tube hold for add-on     Comment: Auto resulted       Green Top (Gel) [[id number]] Collected:  08/05/20 1811    Specimen:  Blood from Arm, Right Updated:  08/05/20 1915     Extra Tube Hold for add-ons.     Comment: Auto resulted.       Lavender Top [[id number]] Collected:  08/05/20 1811    Specimen:  Blood from Arm, Right Updated:  08/05/20 1915     Extra Tube hold for add-on     Comment: Auto resulted       Magnesium [[id number]]  (Abnormal) Collected:  08/05/20 1811    Specimen:  Blood from Arm, Right Updated:  08/05/20 1836     Magnesium 3.2 mg/dL     Phosphorus [[id number]]  (Abnormal) Collected:  08/05/20 1811    Specimen:  Blood from Arm, Right Updated:  08/05/20 1836     Phosphorus 6.7 mg/dL     C-reactive Protein [[id number]]  (Abnormal) Collected:  08/05/20 1811     Specimen:  Blood from Arm, Right Updated:  08/05/20 1836     C-Reactive Protein 0.87 mg/dL     Protime-INR [[id number]]  (Normal) Collected:  08/05/20 1811    Specimen:  Blood from Arm, Right Updated:  08/05/20 1833     Protime 14.3 Seconds      INR 1.06    Narrative:       Therapeutic range for most indications is 2.0-3.0 INR,  or 2.5-3.5 for mechanical heart valves.    aPTT [[id number]]  (Normal) Collected:  08/05/20 1811    Specimen:  Blood from Arm, Right Updated:  08/05/20 1833     PTT 24.7 seconds     Narrative:       The recommended Heparin therapeutic range is 68-97 seconds.    Lactic Acid, Plasma [[id number]]  (Abnormal) Collected:  08/05/20 1811    Specimen:  Blood from Arm, Right Updated:  08/05/20 1828     Lactate 3.5 mmol/L     Lactic Acid, Reflex Timer (This will reflex a repeat order 3-3:15 hours after ordered.) [[id number]] Collected:  08/05/20 1811    Specimen:  Blood from Arm, Right Updated:  08/05/20 1828    Calcium, Ionized [[id number]]  (Normal) Collected:  08/05/20 1811    Specimen:  Blood from Arm, Right Updated:  08/05/20 1816     Ionized Calcium 5.24 mg/dL     CBC & Differential [[id number]] Collected:  08/05/20 1811    Specimen:  Blood from Arm, Right Updated:  08/05/20 1815    Narrative:       The following orders were created for panel order CBC & Differential.  Procedure                               Abnormality         Status                     ---------                               -----------         ------                     CBC Auto Differential[[id number]]        Abnormal            Final result                 Please view results for these tests on the individual orders.    CBC Auto Differential [[id number]]  (Abnormal) Collected:  08/05/20 1811    Specimen:  Blood from Arm, Right Updated:  08/05/20 1815     WBC 16.03 10*3/mm3      RBC 5.81 10*6/mm3      Hemoglobin 16.1 g/dL      Hematocrit 50.5 %      MCV 86.9 fL      MCH 27.7 pg      MCHC 31.9 g/dL      RDW 14.1 %      RDW-SD 44.1  fl      MPV 12.0 fL      Platelets 243 10*3/mm3      Neutrophil % 82.7 %      Lymphocyte % 10.0 %      Monocyte % 6.5 %      Eosinophil % 0.0 %      Basophil % 0.4 %      Immature Grans % 0.4 %      Neutrophils, Absolute 13.26 10*3/mm3      Lymphocytes, Absolute 1.61 10*3/mm3      Monocytes, Absolute 1.04 10*3/mm3      Eosinophils, Absolute 0.00 10*3/mm3      Basophils, Absolute 0.06 10*3/mm3      Immature Grans, Absolute 0.06 10*3/mm3      nRBC 0.0 /100 WBC     Blood Culture - Blood, Arm, Right [[id number]] Collected:  08/05/20 1811    Specimen:  Blood from Arm, Right Updated:  08/05/20 1811        Imaging Results (Last 24 Hours)     Procedure Component Value Units Date/Time    XR Chest 1 View [[id number]] Collected:  08/05/20 1813     Updated:  08/05/20 1831    Narrative:       [facility] [place]    Patient Name: PEBBLES WONG    ORDERING: MONO CR     ATTENDING: MONO CR     REFERRING: MONO CR    -----------------------    PROCEDURE: Chest Single View    TECHNIQUE: Single AP view of the chest    COMPARISON: None    HISTORY: Severe Sepsis triage protocol, A41.9 Sepsis, unspecified  organism R65.20 Severe sepsis without septic shock E11.10 Type 2  diabetes mellitus with ketoacidosis without coma    FINDINGS:     Life-support devices: There is a dual lead left subclavian  pacemaking device present.    Lungs/pleura: Hazy patchy infiltrate suspected within the right  mid to lower lung zones centrally. No dense consolidation,  effusion, or pneumothorax.    Heart, hilar and mediastinal structures: The heart size and  mediastinal contours are within limits of normal. The trachea is  midline.    Skeletal Structures: No acute findings. No free air beneath the  diaphragm.      Impression:       Hazy infiltrative change right mid to lower lung zone.    Electronically signed by:  Anthony Boyce MD  8/5/2020 6:30 PM CDT  Workstation: [id number]            Assessment:    [facility]  Problems    Diagnosis   • **Diabetic ketoacidosis without coma (CMS/HCC)   • Severe sepsis (CMS/HCC)   • Essential hypertension   • Elevated troponin   • Community acquired pneumonia of right middle lobe of lung           Plan:    Diabetic ketoacidosis.  Patient with poorly controlled type 2 diabetes mellitus with concern for medication noncompliance despite patient reports.  A1c unable to be resulted (being sent to reference lab).  Underlying cause for DKA potentially gastroenteritis versus possible community-acquired pneumonia.  Serum pH is 7.250 with a glucose of 1116 and serum potassium of 6.4.  Anion gap of 18 and serum bicarbonate of 20.  We will initiate DKA insulin drip protocol and continue with aggressive volume resuscitation.  Monitoring basic metabolic panel every 4 hours.  Repeat ABG at midnight.  Nutrition consult and diabetic education consult ordered.    Concern for community-acquired pneumonia.  Chest x-ray revealing a hazy density within the right middle lobe.  While there are minimal respiratory symptoms present we will proceed with treatment for possible community-acquired pneumonia.  Coronavirus testing is pending.  Ceftriaxone and azithromycin ordered.    Elevated troponin.  This is likely secondary to stress of underlying DKA.  We will trend troponins for the next 12 hours.  EKG is negative for changes of ACS.  The patient is without chest pain.    Hypertension.  Patient was initially hypotensive upon presentation but has responded well to fluids.  We will hold off on antihypertensive agents this evening and resume tomorrow as her blood pressure normalizes.      Jed De Luna MD  Hospitalist Service                  Electronically signed by Jed De Luna MD at 08/05/20 2208          Emergency Department Notes      Twila Vee, RN at 08/05/20 1744        Patient presents to ED with chief complaint of chest pain, hyperglycemia, abdominal pain. Patient was given 324mg of aspirin  "via EMS and one nitro sublingual.      Twila Vee RN  08/05/20 1915      Electronically signed by Twila Vee RN at 08/05/20 1915     Twila Vee RN at 08/05/20 1745        Patient was placed in face mask during first look triage.  Patient was wearing a face mask throughout encounter.  This RN wore personal protective equipment throughout the encounter.  Hand hygiene was performed before and after patient encounter.       Twila Vee RN  08/05/20 1745      Electronically signed by Twila Vee RN at 08/05/20 1745     Cullen Keane MD at 08/05/20 1802     Attestation signed by Car Kraft MD at 08/05/20 2202    I personally saw and examined the patient.  I have reviewed and agree with the resident's findings including all diagnostic interpretations and treatment plan as documented. I was present for key portions of separately performed procedures and the inclusive time noted in any critical care situation.    Car Kraft MD 8/5/2020 22:02                  Subjective   46 y/o with T2DM, HTN, HFrEF, GERD, presents complaining of nausea and abdominal pain of 2 days duration. He has not missed insulin. Nausea has progressed with no emesis and today he felt enough malaise to be brought in by ambulance for evaluation to the ED. Complains of generalized abdominal pain, 6/10, constant, dull/aching. No agggravating or alleviating factors. Associated with nausea, malaise. EMS glucose monitor measured \"high.\"           Review of Systems   Constitutional: Positive for chills. Negative for fatigue and fever.   HENT: Negative for congestion and sore throat.    Eyes: Negative for pain and visual disturbance.   Respiratory: Negative for cough and shortness of breath.    Cardiovascular: Negative for chest pain and leg swelling.   Gastrointestinal: Positive for abdominal pain and nausea.   Genitourinary: Negative for dysuria and flank pain.   Musculoskeletal: Negative for " arthralgias and back pain.   Skin: Negative for pallor and rash.   Neurological: Negative for dizziness and headaches.   Psychiatric/Behavioral: Negative for confusion and dysphoric mood.       History reviewed. No pertinent past medical history.    Allergies   Allergen Reactions   • Penicillins Unknown - Low Severity       No past surgical history on file.    History reviewed. No pertinent family history.    Social History     Socioeconomic History   • Marital status:      Spouse name: Not on file   • Number of children: Not on file   • Years of education: Not on file   • Highest education level: Not on file           Objective   Physical Exam   Constitutional: He is oriented to person, place, and time. He appears well-developed and well-nourished. He appears ill. No distress.   HENT:   Head: Normocephalic and atraumatic.   Eyes: Pupils are equal, round, and reactive to light. EOM are normal.   Neck: Normal range of motion. Neck supple.   Cardiovascular: Regular rhythm and intact distal pulses. Tachycardia present.   Pulmonary/Chest: Effort normal. He has decreased breath sounds (body habitus). He has no wheezes. He has no rhonchi. He has no rales.   Abdominal: Soft. There is tenderness (diffusely).   Musculoskeletal: Normal range of motion.        Right lower leg: He exhibits no edema.        Left lower leg: He exhibits no edema.   Neurological: He is alert and oriented to person, place, and time.   Skin: Capillary refill takes 2 to 3 seconds. There is pallor.       Procedures          ED Course  ED Course as of Aug 05 2111   Wed Aug 05, 2020   1809 Sepsis bolus ordered, VS monitored   BP(!): 70/46 [CZ]   1948 Acetone(!): Moderate [CZ]   2037 Troponin T(!!): 0.313 [EMMA]      ED Course User Index  [CZ] Cullen Keane MD  [EMMA] Negrito Rebollar PA-C                                           MDM  Number of Diagnoses or Management Options  Diabetic ketoacidosis without coma associated with type 2  diabetes mellitus (CMS/HCC): established and improving  Severe sepsis (CMS/HCC): new and requires workup  Diagnosis management comments: Patient with known T2DM with worsening shortness of air and malaise with work up indicative of DKA by pH, CO2, glucose level, serum acetone. He additionally has a RLL pnemonia. His vitals were such that he was classified as severe sepsis (elevated WBC and source) and intially had low BP that responded well to NS bolus. Lactate elevated. To receive 2g rocephin and 500 mg azithromycin. Also has possible HEATHER, no baseline renal function known. He also has an elevated troponin of undetermined significance as he is not complaining of chest pain. Hospitalist consulted and sign out given by Dr. Kraft to Dr. Hi who will admit patient and handle further management.        Amount and/or Complexity of Data Reviewed  Clinical lab tests: reviewed and ordered  Tests in the radiology section of CPT®:  reviewed and ordered  Tests in the medicine section of CPT®:  reviewed and ordered  Discuss the patient with other providers: yes  Independent visualization of images, tracings, or specimens: yes    Risk of Complications, Morbidity, and/or Mortality  Presenting problems: moderate  Diagnostic procedures: moderate  Management options: moderate    Patient Progress  Patient progress: improved      Final diagnoses:   Severe sepsis (CMS/[facility])   Diabetic ketoacidosis without coma associated with type 2 diabetes mellitus (CMS/[facility])   HEATHER (acute kidney injury) (CMS/[facility])   Elevated troponin   Pneumonia of right lung due to infectious organism, unspecified part of lung            Cullen Keane MD  Resident  08/05/20 2111      Electronically signed by Car Kraft MD at 08/05/20 2202     Twila Vee RN at 08/05/20 1831        Lab notified of patient screening positive for severe sepsis and needing second set of blood cultures and acetone.      Twila Vee RN  08/05/20  1832      Electronically signed by Twila Vee RN at 08/05/20 1832     Melba Mckinney RN at 08/05/20 2040        Lab reports they are not able to run the Hgb A1C. States that it has been ran five times. They are sending out to another facility. Provider made aware.      Melba Mckinney RN  08/05/20 2040      Electronically signed by Melba Mckinney RN at 08/05/20 2040     Melba Mckinney RN at 08/05/20 2101        2100 Fingerstick greater than 600     Melba Mckinney RN  08/05/20 2101      Electronically signed by Melba Mckinney RN at 08/05/20 2101     Melba Mckinney RN at 08/05/20 2106        Pharmacy to send Rocephin and Azithromycin     Melba Mckinney RN  08/05/20 2106      Electronically signed by Melba Mckinney RN at 08/05/20 2106     Melba Mckinney RN at 08/05/20 2202        2200 Fingerstick is greater than 600     Melba Mckinney RN  08/05/20 2202      Electronically signed by Melba Mckinney RN at 08/05/20 2202     Melba Mckinney RN at 08/05/20 2314        2300 Fingerstick greater than 600     Melba Mckinney RN  08/05/20 2314      Electronically signed by Melba Mckinney RN at 08/05/20 2314     Melba Mckinney RN at 08/05/20 2321        Dr De Luna made aware of repeat labs and abnormal values.      Melba Mckinney RN  08/05/20 2321      Electronically signed by Melba Mckinney RN at 08/05/20 2321     Melba Mckinney RN at 08/05/20 2167        Spoke with Dr. De Luna regarding pt's blood pressure of 77/50. He reports to keep an eye on BP. Continue LR fluid bolus and LR running rate. If pt continues to get lower then call provider back.     Pt moved to a hospital bed at this time and made as comfortable as possible.      Melba Mckinney RN  08/05/20 3602      Electronically signed by Melba Mckinney RN at 08/05/20 2355     Melba Mckinney RN at 08/06/20 0010        Fingerstick for 0000 is 590     Melba Mckinney RN  08/06/20 0010      Electronically signed by Hank,  Melba HEWITT RN at 08/06/20 0010     Melba Mckinney RN at 08/06/20 0111        Fingerstick for 0100 is 584       Melba Mckinney RN  08/06/20 0112      Electronically signed by Melba Mckinney RN at 08/06/20 0112     Melba Mckinney RN at 08/06/20 0205        Fingerstick for 0200 is 572.      Melba Mckinney RN  08/06/20 0205      Electronically signed by Melba Mckinney RN at 08/06/20 0205     Melba Mckinney RN at 08/06/20 0300        0300 fingerstick is 460       Hank, Melba M, RN  08/06/20 0326      Electronically signed by Melba Mckinney RN at 08/06/20 0326     Melba Mckinney RN at 08/06/20 0358        0400 fingerstick 344     Melba Mckinney RN  08/06/20 0359      Electronically signed by Melba Mckinney RN at 08/06/20 0359     Melba Mckinney RN at 08/06/20 0508        0500 fingerstick is 267     Melba Mckinney RN  08/06/20 0508      Electronically signed by Melba Mckinney RN at 08/06/20 0508     Melba Mckinney RN at 08/06/20 0607        0600 fingerstick is 0254     Melba Mckinney RN  08/06/20 0607      Electronically signed by Melba Mckinney RN at 08/06/20 0607     Melba Mckinney RN at 08/06/20 0651        Fingerstick for 0700 is 143     Melba Mckinney RN  08/06/20 0651      Electronically signed by Melba Mckinney RN at 08/06/20 0651     Melba Mckinney RN at 08/06/20 0719        Report given to GLORIA Denis Kayla M, RN  08/06/20 0719      Electronically signed by Melba Mckinney RN at 08/06/20 0719     Cira Bhardwaj RN at 08/06/20 0730        Pt is sleeping at this time.      Cira Bhardwaj RN  08/06/20 0730      Electronically signed by Cira Bhardwaj RN at 08/06/20 0730     Cira Bhardwaj RN at 08/06/20 0805        Blood sugar 353 at this time     Cira Bhardwaj RN  08/06/20 0806      Electronically signed by Cira Bhardwaj RN at 08/06/20 0806       Vital Signs (last day)     Date/Time   Temp   Temp src   Pulse   Resp   BP   Patient  Position   SpO2    08/06/20 08:01:30   98.1 (36.7)   Oral   93   20   (!) 88/50   Sitting   98    08/06/20 0726   --   --   94   (!) 32   (!) 79/48   --   98    08/06/20 0700   --   --   102   --   (!) 80/48   --   97    08/06/20 0653   --   --   102   --   (!) 74/51   --   98    08/06/20 0607   --   --   106   --   107/57   --   98    08/06/20 0537   --   --   110   --   (!) 83/46   --   97    08/06/20 0530   --   --   110   --   --   --   98    08/06/20 0500   --   --   110   --   --   --   97    08/06/20 0448   --   --   108   --   115/57   --   94    08/06/20 0401   --   --   110   --   (!) 75/44   --   --    08/06/20 0400   --   --   110   --   (!) 75/44   --   97    08/06/20 0330   --   --   110   --   (!) 83/52   --   98    08/06/20 0300   --   --   108   --   (!) 78/43   --   99    08/06/20 0230   --   --   108   --   94/55   --   99    08/06/20 0200   --   --   106   --   110/69   --   97    08/06/20 0130   --   --   102   --   109/70   --   96    08/06/20 0129   --   --   --   --   --   --   97    08/06/20 0101   --   --   102   24   (!) 88/54   --   99    08/06/20 0040   --   --   100   --   (!) 85/60   --   100    08/05/20 2349   --   --   102   --   (!) 77/50   --   97    08/05/20 2230   --   --   118   --   144/68   --   --    08/05/20 2200   --   --   108   --   128/58   --   --    08/05/20 2130   --   --   106   --   100/64   --   97    08/05/20 2100   --   --   106   --   96/56   --   96    08/05/20 2046   --   --   104   --   --   --   97    08/05/20 2043   --   --   104   --   100/55   --   98    08/05/20 2030   --   --   102   --   --   --   98    08/05/20 2000   --   --   98   --   --   --   97    08/05/20 1950   --   --   108   --   99/61   --   --    08/05/20 1929   --   --   104   --   --   --   96    08/05/20 1850   --   --   106   --   109/60   --   97    08/05/20 1840   --   --   104   --   108/56   --   97    08/05/20 1830   --   --   112   --   125/70   --   96    08/05/20 1828   --   --    108   --   --   --   97    08/05/20 1821   --   --   110   --   116/61   --   95    08/05/20 1816   --   --   116   --   113/56   --   97    08/05/20 1811   --   --   108   --   --   --   97    08/05/20 1804   --   --   114   --   --   --   98    08/05/20 17:53:21   --   --   --   --   (!) 70/46   Sitting   94    08/05/20 1748   98.3 (36.8)   Oral   120   24   --   --   90    08/05/20 1744   --   --   120   --   --   --   --                Current Facility-Administered Medications   Medication Dose Route Frequency Provider Last Rate Last Dose   • acetaminophen (TYLENOL) tablet 650 mg  650 mg Oral Q4H PRN Jed De Luna MD       • aspirin chewable tablet 81 mg  81 mg Oral Daily Jed De Luna MD       • azithromycin (ZITHROMAX) tablet 500 mg  500 mg Oral Q24H Jed De Luna MD       • cefTRIAXone (ROCEPHIN) 2 g/100 mL 0.9% NS IVPB (MBP)  2 g Intravenous Q24H Jed De Luna MD       • dextrose (D50W) 25 g/ 50mL Intravenous Solution 12.5 g  12.5 g Intravenous PRN Jed De Luna MD       • dextrose 5 % and sodium chloride 0.45 % infusion  150 mL/hr Intravenous Continuous PRN Jed De Luna MD       • dextrose 5 % and sodium chloride 0.45 % with KCl 20 mEq/L infusion  150 mL/hr Intravenous Continuous PRN Jed De Luna MD       • dextrose 5 % and sodium chloride 0.45 % with KCl 40 mEq/L infusion  150 mL/hr Intravenous Continuous PRN Jed De Luna MD       • dextrose 5 % and sodium chloride 0.9 % infusion  150 mL/hr Intravenous Continuous PRJed Shah MD       • dextrose 5 % and sodium chloride 0.9 % with KCl 20 mEq/L infusion  150 mL/hr Intravenous Continuous PRJed Shah MD       • dextrose 5 % and sodium chloride 0.9 % with KCl 40 mEq/L infusion  150 mL/hr Intravenous Continuous PRN Jed De Luna MD       • heparin (porcine) 5000 UNIT/ML injection 5,000 Units  5,000 Units Subcutaneous Q8H Jed De Luna MD   5,000 Units at 08/06/20 0604   • insulin regular  (HumuLIN R,NovoLIN R) 100 Units in sodium chloride 0.9 % 100 mL (1 Units/mL) infusion  13 Units/hr Intravenous Titrated Jed De Luna MD 17 mL/hr at 08/06/20 0822 17 Units/hr at 08/06/20 0822   • lactated ringers infusion  250 mL/hr Intravenous Continuous Jed De Luna  mL/hr at 08/06/20 0706 250 mL/hr at 08/06/20 0706   • ondansetron (ZOFRAN) injection 4 mg  4 mg Intravenous Q6H PRN Jed De Luna MD       • pantoprazole (PROTONIX) injection 40 mg  40 mg Intravenous Q AM Jed De Luna MD   40 mg at 08/06/20 0604   • sodium chloride 0.45 % 1,000 mL with potassium chloride 40 mEq infusion  250 mL/hr Intravenous Continuous PRN Jed De Luna MD       • sodium chloride 0.45 % infusion  250 mL/hr Intravenous Continuous PRN Jed De Luna MD       • sodium chloride 0.45 % with KCl 20 mEq/L infusion  250 mL/hr Intravenous Continuous PRN Jed De Luna MD       • sodium chloride 0.9 % flush 10 mL  10 mL Intravenous PRN Jed De Luna MD       • sodium chloride 0.9 % flush 10 mL  10 mL Intravenous Q12H Jed De Luna MD   10 mL at 08/05/20 2144   • sodium chloride 0.9 % flush 10 mL  10 mL Intravenous PRN Jed De Luna MD       • sodium chloride 0.9 % flush 10 mL  10 mL Intravenous Once PRN Jed De Luna MD       • sodium chloride 0.9 % flush 3 mL  3 mL Intravenous Q12H Jed De Luna MD   3 mL at 08/05/20 2144   • sodium chloride 0.9 % infusion  250 mL/hr Intravenous Continuous PRN Jed De Luna MD       • sodium chloride 0.9 % infusion  10 mL/hr Intravenous Continuous PRN Jed De Luna MD       • sodium chloride 0.9 % with KCl 20 mEq/L infusion  250 mL/hr Intravenous Continuous PRN Jed De Luna MD       • sodium chloride 0.9 % with KCl 40 mEq/L infusion  250 mL/hr Intravenous Continuous PRN Jed eD Luna MD         Current Outpatient Medications   Medication Sig Dispense Refill   • amLODIPine (NORVASC) 10 MG tablet amlodipine 10 mg tablet     •  aspirin 81 MG chewable tablet aspirin 81 mg chewable tablet     • carvedilol (COREG) 25 MG tablet carvedilol 25 mg tablet     • Ergocalciferol (VITAMIN D2 PO) Vitamin D2 1,250 mcg (50,000 unit) capsule     • hydroCHLOROthiazide (HYDRODIURIL) 25 MG tablet hydrochlorothiazide 25 mg tablet     • Insulin Glargine (BASAGLAR KWIKPEN SC) Basaglar KwikPen U-100 Insulin 100 unit/mL (3 mL) subcutaneous     • isosorbide mononitrate (IMDUR) 30 MG 24 hr tablet isosorbide mononitrate ER 30 mg tablet,extended release 24 hr   Take 1 tablet every day by oral route.     • metFORMIN (GLUCOPHAGE) 1000 MG tablet metformin 1,000 mg tablet     • methocarbamol (ROBAXIN) 500 MG tablet methocarbamol 500 mg tablet   Take 1 tablet every day by oral route.     • raNITIdine (ZANTAC) 150 MG tablet ranitidine 150 mg tablet       Lab Results (most recent)     Procedure Component Value Units Date/Time    POC Glucose Once [[id number]]  (Abnormal) Collected:  08/06/20 0803    Specimen:  Blood Updated:  08/06/20 0818     Glucose 353 mg/dL      Comment: Result Not ConfirmedOperator: [id number] [facility] ID: [id number]       POC Glucose Once [[id number]]  (Abnormal) Collected:  08/06/20 0650    Specimen:  Blood Updated:  08/06/20 0714     Glucose 143 mg/dL      Comment: : [id number] RAYA Gee ID: [id number]       Urinalysis, Microscopic Only - Urine, Clean Catch [[id number]]  (Abnormal) Collected:  08/06/20 0443    Specimen:  Urine, Clean Catch Updated:  08/06/20 0514     RBC, UA None Seen /HPF      WBC, UA 13-20 /HPF      Bacteria, UA 1+ /HPF      Squamous Epithelial Cells, UA 0-2 /HPF      Hyaline Casts, UA 3-6 /LPF      Methodology Manual Light Microscopy    Urine Culture - Urine, Urine, Clean Catch [[id number]] Collected:  08/06/20 0443    Specimen:  Urine, Clean Catch Updated:  08/06/20 0514    Blood Gas, Arterial [[id number]]  (Abnormal) Collected:  08/06/20 0458    Specimen:  Arterial Blood Updated:  08/06/20 0511      Site Left Radial     Lawrence's Test N/A     pH, Arterial 7.324 pH units      Comment: 84 Value below reference range        pCO2, Arterial 42.0 mm Hg      pO2, Arterial 114.0 mm Hg      Comment: 83 Value above reference range        HCO3, Arterial 21.8 mmol/L      Base Excess, Arterial -4.1 mmol/L      Comment: 84 Value below reference range        O2 Saturation, Arterial 98.4 %      Barometric Pressure for Blood Gas 749 mmHg      Modality Nasal Cannula     Flow Rate 1.0 lpm      Ventilator Mode NA     Collected by RT     Comment: Meter: [id number]     :  [id number]       Lactic Acid, Plasma [[id number]]  (Abnormal) Collected:  08/06/20 0431    Specimen:  Blood Updated:  08/06/20 0457     Lactate 4.5 mmol/L     Urinalysis With Culture If Indicated - Urine, Clean Catch [[id number]]  (Abnormal) Collected:  08/06/20 0443    Specimen:  Urine, Clean Catch Updated:  08/06/20 0455     Color, UA Yellow     Appearance, UA Cloudy     pH, UA <=5.0     Specific Gravity, UA 1.029     Glucose, UA >=1000 mg/dL (3+)     Ketones, UA Trace     Bilirubin, UA Negative     Blood, UA Negative     Protein, UA 30 mg/dL (1+)     Leuk Esterase, UA Moderate (2+)     Nitrite, UA Negative     Urobilinogen, UA 0.2 E.U./dL    Magnesium [[id number]]  (Normal) Collected:  08/06/20 0354    Specimen:  Blood Updated:  08/06/20 0455     Magnesium 1.9 mg/dL     Basic Metabolic Panel [[id number]]  (Abnormal) Collected:  08/06/20 0354    Specimen:  Blood Updated:  08/06/20 0455     Glucose 284 mg/dL      BUN 61 mg/dL      Creatinine 2.76 mg/dL      Sodium 149 mmol/L      Potassium 4.6 mmol/L      Chloride 112 mmol/L      CO2 18.0 mmol/L      Calcium 9.4 mg/dL      eGFR  African Amer 30 mL/min/1.73      BUN/Creatinine Ratio 22.1     Anion Gap 19.0 mmol/L     Narrative:       GFR Normal >60  Chronic Kidney Disease <60  Kidney Failure <15      Troponin [[id number]]  (Abnormal) Collected:  08/06/20 0354    Specimen:  Blood Updated:  08/06/20 0442      Troponin T 0.740 ng/mL     Narrative:       Troponin T Reference Range:  <= 0.03 ng/mL-   Negative for AMI  >0.03 ng/mL-     Abnormal for myocardial necrosis.  Clinicians would have to utilize clinical acumen, EKG, Troponin and serial changes to determine if it is an Acute Myocardial Infarction or myocardial injury due to an underlying chronic condition.       Results may be falsely decreased if patient taking Biotin.      CBC & Differential [[id number]] Collected:  08/06/20 0354    Specimen:  Blood Updated:  08/06/20 0438    Narrative:       The following orders were created for panel order CBC & Differential.  Procedure                               Abnormality         Status                     ---------                               -----------         ------                     CBC Auto Differential[[id number]]        Abnormal            Final result                 Please view results for these tests on the individual orders.    CBC Auto Differential [[id number]]  (Abnormal) Collected:  08/06/20 0354    Specimen:  Blood Updated:  08/06/20 0438     WBC 13.72 10*3/mm3      RBC 4.27 10*6/mm3      Hemoglobin 11.9 g/dL      Hematocrit 37.4 %      MCV 87.6 fL      MCH 27.9 pg      MCHC 31.8 g/dL      RDW 13.7 %      RDW-SD 43.8 fl      MPV 11.1 fL      Platelets 174 10*3/mm3      Neutrophil % 80.2 %      Lymphocyte % 9.8 %      Monocyte % 9.3 %      Eosinophil % 0.1 %      Basophil % 0.2 %      Immature Grans % 0.4 %      Neutrophils, Absolute 10.99 10*3/mm3      Lymphocytes, Absolute 1.35 10*3/mm3      Monocytes, Absolute 1.28 10*3/mm3      Eosinophils, Absolute 0.01 10*3/mm3      Basophils, Absolute 0.03 10*3/mm3      Immature Grans, Absolute 0.06 10*3/mm3      nRBC 0.0 /100 WBC     Beta-Hydroxybutyrate [[id number]] Collected:  08/06/20 0354    Specimen:  Blood Updated:  08/06/20 0420    COVID-19, [facility] IN-HOUSE, NP SWAB IN TRANSPORT MEDIA 8-10 HR TAT - Swab, Nasopharynx [[id number]]  (Normal) Collected:  08/05/20  2002    Specimen:  Swab from Nasopharynx Updated:  08/06/20 0308     COVID19 Not Detected    Narrative:       Testing performed by Real Time RT-PCR  This test has not been approved by the [facility] but is authorized under the Emergency Use Act (EUA)    Fact sheet for providers: https://www.fda.gov/media/840188/download    Fact sheet for patients: https://www.fda.gov/media/420163/download        Blood Gas, Arterial [[id number]]  (Abnormal) Collected:  08/06/20 0110    Specimen:  Arterial Blood Updated:  08/06/20 0122     Site Left Radial     Lawrence's Test N/A     pH, Arterial 7.309 pH units      Comment: 84 Value below reference range        pCO2, Arterial 46.1 mm Hg      Comment: 83 Value above reference range        pO2, Arterial 91.6 mm Hg      HCO3, Arterial 23.1 mmol/L      Base Excess, Arterial -3.4 mmol/L      Comment: 84 Value below reference range        O2 Saturation, Arterial 96.9 %      Barometric Pressure for Blood Gas 749 mmHg      Modality Nasal Cannula     Flow Rate 2.0 lpm      Ventilator Mode NA     Collected by RT     Comment: Meter: [id number]     :  [id number]       Troponin [[id number]]  (Abnormal) Collected:  08/05/20 2225    Specimen:  Blood Updated:  08/05/20 2303     Troponin T 0.646 ng/mL     Narrative:       Troponin T Reference Range:  <= 0.03 ng/mL-   Negative for AMI  >0.03 ng/mL-     Abnormal for myocardial necrosis.  Clinicians would have to utilize clinical acumen, EKG, Troponin and serial changes to determine if it is an Acute Myocardial Infarction or myocardial injury due to an underlying chronic condition.       Results may be falsely decreased if patient taking Biotin.      Basic Metabolic Panel [[id number]]  (Abnormal) Collected:  08/05/20 2225    Specimen:  Blood Updated:  08/05/20 2303     Glucose 994 mg/dL      BUN 79 mg/dL      Creatinine 3.35 mg/dL      Sodium 141 mmol/L      Potassium 6.6 mmol/L      Comment: Specimen hemolyzed.  Results may be affected.        Chloride  103 mmol/L      CO2 16.0 mmol/L      Calcium 10.4 mg/dL      eGFR  African Amer 24 mL/min/1.73      BUN/Creatinine Ratio 23.6     Anion Gap 22.0 mmol/L     Narrative:       GFR Normal >60  Chronic Kidney Disease <60  Kidney Failure <15      Lactic Acid, Reflex [[id number]]  (Abnormal) Collected:  08/05/20 2225    Specimen:  Blood Updated:  08/05/20 2303     Lactate 3.7 mmol/L     Hemoglobin A1C - Miscellaneous Test [[id number]] Collected:  08/05/20 1811    Specimen:  Blood Updated:  08/05/20 2046    Ethanol [[id number]] Collected:  08/05/20 1927    Specimen:  Blood from Arm, Right Updated:  08/05/20 2014     Ethanol <10 mg/dL      Ethanol % <0.010 %     Comprehensive Metabolic Panel [[id number]]  (Abnormal) Collected:  08/05/20 1927    Specimen:  Blood from Arm, Right Updated:  08/05/20 2001     Glucose 1,116 mg/dL      BUN 71 mg/dL      Creatinine 2.83 mg/dL      Sodium 140 mmol/L      Potassium 6.4 mmol/L      Chloride 102 mmol/L      CO2 20.0 mmol/L      Calcium 9.7 mg/dL      Total Protein 6.4 g/dL      Albumin 3.20 g/dL      ALT (SGPT) 18 U/L      AST (SGOT) 21 U/L      Alkaline Phosphatase 105 U/L      Total Bilirubin 0.2 mg/dL      eGFR  African Amer 29 mL/min/1.73      Globulin 3.2 gm/dL      A/G Ratio 1.0 g/dL      BUN/Creatinine Ratio 25.1     Anion Gap 18.0 mmol/L     Narrative:       GFR Normal >60  Chronic Kidney Disease <60  Kidney Failure <15      Phosphorus [[id number]]  (Abnormal) Collected:  08/05/20 1927    Specimen:  Blood from Arm, Right Updated:  08/05/20 1950     Phosphorus 5.7 mg/dL     Magnesium [[id number]]  (Normal) Collected:  08/05/20 1927    Specimen:  Blood from Arm, Right Updated:  08/05/20 1950     Magnesium 2.6 mg/dL     Osmolality, Serum [[id number]]  (Abnormal) Collected:  08/05/20 1927    Specimen:  Blood Updated:  08/05/20 1944     Osmolality 384 mOsm/kg     Ketone Bodies, Serum (Not performed at [place]) [[id number]] Collected:  08/05/20 1927    Specimen:  Blood Updated:  08/05/20  1939    Narrative:       The following orders were created for panel order Ketone Bodies, Serum (Not performed at [place]).  Procedure                               Abnormality         Status                     ---------                               -----------         ------                     Acetone[[id number]]                      Abnormal            Final result                 Please view results for these tests on the individual orders.    Acetone [[id number]]  (Abnormal) Collected:  08/05/20 1927    Specimen:  Blood Updated:  08/05/20 1939     Acetone Moderate    Blood Culture - Blood, Wrist, Left [[id number]] Collected:  08/05/20 1927    Specimen:  Blood from Wrist, Left Updated:  08/05/20 1928    [place] Draw [[id number]] Collected:  08/05/20 1811    Specimen:  Blood from Arm, Right Updated:  08/05/20 1915    Narrative:       The following orders were created for panel order [place] Draw.  Procedure                               Abnormality         Status                     ---------                               -----------         ------                     Light Blue Top[[id number]]                                   Final result               Green Top (Gel)[[id number]]                                  Final result               Lavender Top[[id number]]                                     Final result               Gold Top - SST[[id number]]                                                                Please view results for these tests on the individual orders.    Light Blue Top [[id number]] Collected:  08/05/20 1811    Specimen:  Blood from Arm, Right Updated:  08/05/20 1915     Extra Tube hold for add-on     Comment: Auto resulted       Green Top (Gel) [[id number]] Collected:  08/05/20 1811    Specimen:  Blood from Arm, Right Updated:  08/05/20 1915     Extra Tube Hold for add-ons.     Comment: Auto resulted.       Lavender Top [[id number]] Collected:  08/05/20 1811    Specimen:  Blood from Arm, Right  Updated:  08/05/20 1915     Extra Tube hold for add-on     Comment: Auto resulted       Phosphorus [[id number]]  (Abnormal) Collected:  08/05/20 1811    Specimen:  Blood from Arm, Right Updated:  08/05/20 1836     Phosphorus 6.7 mg/dL     C-reactive Protein [[id number]]  (Abnormal) Collected:  08/05/20 1811    Specimen:  Blood from Arm, Right Updated:  08/05/20 1836     C-Reactive Protein 0.87 mg/dL     Protime-INR [[id number]]  (Normal) Collected:  08/05/20 1811    Specimen:  Blood from Arm, Right Updated:  08/05/20 1833     Protime 14.3 Seconds      INR 1.06    Narrative:       Therapeutic range for most indications is 2.0-3.0 INR,  or 2.5-3.5 for mechanical heart valves.    aPTT [[id number]]  (Normal) Collected:  08/05/20 1811    Specimen:  Blood from Arm, Right Updated:  08/05/20 1833     PTT 24.7 seconds     Narrative:       The recommended Heparin therapeutic range is 68-97 seconds.    Lactic Acid, Plasma [[id number]]  (Abnormal) Collected:  08/05/20 1811    Specimen:  Blood from Arm, Right Updated:  08/05/20 1828     Lactate 3.5 mmol/L     Calcium, Ionized [[id number]]  (Normal) Collected:  08/05/20 1811    Specimen:  Blood from Arm, Right Updated:  08/05/20 1816     Ionized Calcium 5.24 mg/dL     CBC & Differential [[id number]] Collected:  08/05/20 1811    Specimen:  Blood from Arm, Right Updated:  08/05/20 1815    Narrative:       The following orders were created for panel order CBC & Differential.  Procedure                               Abnormality         Status                     ---------                               -----------         ------                     CBC Auto Differential[[id number]]        Abnormal            Final result                 Please view results for these tests on the individual orders.    CBC Auto Differential [[id number]]  (Abnormal) Collected:  08/05/20 1811    Specimen:  Blood from Arm, Right Updated:  08/05/20 1815     WBC 16.03 10*3/mm3      RBC 5.81 10*6/mm3      Hemoglobin  16.1 g/dL      Hematocrit 50.5 %      MCV 86.9 fL      MCH 27.7 pg      MCHC 31.9 g/dL      RDW 14.1 %      RDW-SD 44.1 fl      MPV 12.0 fL      Platelets 243 10*3/mm3      Neutrophil % 82.7 %      Lymphocyte % 10.0 %      Monocyte % 6.5 %      Eosinophil % 0.0 %      Basophil % 0.4 %      Immature Grans % 0.4 %      Neutrophils, Absolute 13.26 10*3/mm3      Lymphocytes, Absolute 1.61 10*3/mm3      Monocytes, Absolute 1.04 10*3/mm3      Eosinophils, Absolute 0.00 10*3/mm3      Basophils, Absolute 0.06 10*3/mm3      Immature Grans, Absolute 0.06 10*3/mm3      nRBC 0.0 /100 WBC     Blood Culture - Blood, Arm, Right [[id number]] Collected:  08/05/20 1811    Specimen:  Blood from Arm, Right Updated:  08/05/20 1811          Imaging Results (Most Recent)     Procedure Component Value Units Date/Time    XR Chest 1 View [[id number]] Collected:  08/05/20 1813     Updated:  08/05/20 1831    Narrative:       [facility] [place]    Patient Name: PEBBLES WONG    ORDERING: MONO CR     ATTENDING: MONO CR     REFERRING: MONO CR    -----------------------    PROCEDURE: Chest Single View    TECHNIQUE: Single AP view of the chest    COMPARISON: None    HISTORY: Severe Sepsis triage protocol, A41.9 Sepsis, unspecified  organism R65.20 Severe sepsis without septic shock E11.10 Type 2  diabetes mellitus with ketoacidosis without coma    FINDINGS:     Life-support devices: There is a dual lead left subclavian  pacemaking device present.    Lungs/pleura: Hazy patchy infiltrate suspected within the right  mid to lower lung zones centrally. No dense consolidation,  effusion, or pneumothorax.    Heart, hilar and mediastinal structures: The heart size and  mediastinal contours are within limits of normal. The trachea is  midline.    Skeletal Structures: No acute findings. No free air beneath the  diaphragm.      Impression:       Hazy infiltrative change right mid to lower lung  zone.    Electronically signed by:  Anthony Boyce MD  8/5/2020 6:30 PM CDT  Workstation: [id number]        Physician Progress Notes (most recent note)    No notes of this type exist for this encounter.         Consult Notes (most recent note)    No notes of this type exist for this encounter.

## 2020-08-07 ENCOUNTER — APPOINTMENT (OUTPATIENT)
Dept: ULTRASOUND IMAGING | Facility: HOSPITAL | Age: 45
End: 2020-08-07

## 2020-08-07 ENCOUNTER — APPOINTMENT (OUTPATIENT)
Dept: GENERAL RADIOLOGY | Facility: HOSPITAL | Age: 45
End: 2020-08-07

## 2020-08-07 LAB
ANION GAP SERPL CALCULATED.3IONS-SCNC: 10 MMOL/L (ref 5–15)
ANION GAP SERPL CALCULATED.3IONS-SCNC: 10 MMOL/L (ref 5–15)
ANION GAP SERPL CALCULATED.3IONS-SCNC: 9 MMOL/L (ref 5–15)
BACTERIA SPEC AEROBE CULT: NORMAL
BASOPHILS # BLD AUTO: 0.04 10*3/MM3 (ref 0–0.2)
BASOPHILS NFR BLD AUTO: 0.4 % (ref 0–1.5)
BUN SERPL-MCNC: 59 MG/DL (ref 6–20)
BUN SERPL-MCNC: 64 MG/DL (ref 6–20)
BUN SERPL-MCNC: 67 MG/DL (ref 6–20)
BUN/CREAT SERPL: 23.3 (ref 7–25)
BUN/CREAT SERPL: 24.3 (ref 7–25)
BUN/CREAT SERPL: 26.9 (ref 7–25)
CALCIUM SPEC-SCNC: 9.2 MG/DL (ref 8.6–10.5)
CALCIUM SPEC-SCNC: 9.4 MG/DL (ref 8.6–10.5)
CALCIUM SPEC-SCNC: 9.5 MG/DL (ref 8.6–10.5)
CHLORIDE SERPL-SCNC: 118 MMOL/L (ref 98–107)
CHLORIDE SERPL-SCNC: 119 MMOL/L (ref 98–107)
CHLORIDE SERPL-SCNC: 119 MMOL/L (ref 98–107)
CO2 SERPL-SCNC: 23 MMOL/L (ref 22–29)
CO2 SERPL-SCNC: 23 MMOL/L (ref 22–29)
CO2 SERPL-SCNC: 25 MMOL/L (ref 22–29)
CREAT SERPL-MCNC: 2.38 MG/DL (ref 0.76–1.27)
CREAT SERPL-MCNC: 2.53 MG/DL (ref 0.76–1.27)
CREAT SERPL-MCNC: 2.76 MG/DL (ref 0.76–1.27)
DEPRECATED RDW RBC AUTO: 41.5 FL (ref 37–54)
EOSINOPHIL # BLD AUTO: 0.03 10*3/MM3 (ref 0–0.4)
EOSINOPHIL NFR BLD AUTO: 0.3 % (ref 0.3–6.2)
ERYTHROCYTE [DISTWIDTH] IN BLOOD BY AUTOMATED COUNT: 13.4 % (ref 12.3–15.4)
GFR SERPL CREATININE-BSD FRML MDRD: 30 ML/MIN/1.73
GFR SERPL CREATININE-BSD FRML MDRD: 34 ML/MIN/1.73
GFR SERPL CREATININE-BSD FRML MDRD: 36 ML/MIN/1.73
GLUCOSE BLDC GLUCOMTR-MCNC: 122 MG/DL (ref 70–130)
GLUCOSE BLDC GLUCOMTR-MCNC: 154 MG/DL (ref 70–130)
GLUCOSE BLDC GLUCOMTR-MCNC: 170 MG/DL (ref 70–130)
GLUCOSE BLDC GLUCOMTR-MCNC: 170 MG/DL (ref 70–130)
GLUCOSE BLDC GLUCOMTR-MCNC: 176 MG/DL (ref 70–130)
GLUCOSE BLDC GLUCOMTR-MCNC: 224 MG/DL (ref 70–130)
GLUCOSE BLDC GLUCOMTR-MCNC: 225 MG/DL (ref 70–130)
GLUCOSE BLDC GLUCOMTR-MCNC: 226 MG/DL (ref 70–130)
GLUCOSE BLDC GLUCOMTR-MCNC: 285 MG/DL (ref 70–130)
GLUCOSE BLDC GLUCOMTR-MCNC: 307 MG/DL (ref 70–130)
GLUCOSE BLDC GLUCOMTR-MCNC: 309 MG/DL (ref 70–130)
GLUCOSE SERPL-MCNC: 115 MG/DL (ref 65–99)
GLUCOSE SERPL-MCNC: 203 MG/DL (ref 65–99)
GLUCOSE SERPL-MCNC: 272 MG/DL (ref 65–99)
HCT VFR BLD AUTO: 41.2 % (ref 37.5–51)
HGB BLD-MCNC: 13.2 G/DL (ref 13–17.7)
IMM GRANULOCYTES # BLD AUTO: 0.04 10*3/MM3 (ref 0–0.05)
IMM GRANULOCYTES NFR BLD AUTO: 0.4 % (ref 0–0.5)
LYMPHOCYTES # BLD AUTO: 1.8 10*3/MM3 (ref 0.7–3.1)
LYMPHOCYTES NFR BLD AUTO: 17.5 % (ref 19.6–45.3)
MAGNESIUM SERPL-MCNC: 2.1 MG/DL (ref 1.6–2.6)
MCH RBC QN AUTO: 27.6 PG (ref 26.6–33)
MCHC RBC AUTO-ENTMCNC: 32 G/DL (ref 31.5–35.7)
MCV RBC AUTO: 86.2 FL (ref 79–97)
MONOCYTES # BLD AUTO: 1.13 10*3/MM3 (ref 0.1–0.9)
MONOCYTES NFR BLD AUTO: 11 % (ref 5–12)
NEUTROPHILS NFR BLD AUTO: 7.26 10*3/MM3 (ref 1.7–7)
NEUTROPHILS NFR BLD AUTO: 70.4 % (ref 42.7–76)
NRBC BLD AUTO-RTO: 0 /100 WBC (ref 0–0.2)
PLATELET # BLD AUTO: 192 10*3/MM3 (ref 140–450)
PMV BLD AUTO: 11.6 FL (ref 6–12)
POTASSIUM SERPL-SCNC: 4.1 MMOL/L (ref 3.5–5.2)
POTASSIUM SERPL-SCNC: 4.2 MMOL/L (ref 3.5–5.2)
POTASSIUM SERPL-SCNC: 4.3 MMOL/L (ref 3.5–5.2)
PROCALCITONIN SERPL-MCNC: 0.38 NG/ML (ref 0–0.25)
RBC # BLD AUTO: 4.78 10*6/MM3 (ref 4.14–5.8)
REF LAB TEST RESULTS: NORMAL
SODIUM SERPL-SCNC: 151 MMOL/L (ref 136–145)
SODIUM SERPL-SCNC: 152 MMOL/L (ref 136–145)
SODIUM SERPL-SCNC: 153 MMOL/L (ref 136–145)
SODIUM UR-SCNC: 50 MMOL/L
WBC # BLD AUTO: 10.3 10*3/MM3 (ref 3.4–10.8)

## 2020-08-07 PROCEDURE — 25010000003 INSULIN REGULAR HUMAN PER 5 UNITS: Performed by: INTERNAL MEDICINE

## 2020-08-07 PROCEDURE — 25010000002 HEPARIN (PORCINE) PER 1000 UNITS: Performed by: INTERNAL MEDICINE

## 2020-08-07 PROCEDURE — 84300 ASSAY OF URINE SODIUM: CPT | Performed by: INTERNAL MEDICINE

## 2020-08-07 PROCEDURE — 82962 GLUCOSE BLOOD TEST: CPT

## 2020-08-07 PROCEDURE — 84156 ASSAY OF PROTEIN URINE: CPT | Performed by: INTERNAL MEDICINE

## 2020-08-07 PROCEDURE — 71046 X-RAY EXAM CHEST 2 VIEWS: CPT

## 2020-08-07 PROCEDURE — 80048 BASIC METABOLIC PNL TOTAL CA: CPT | Performed by: INTERNAL MEDICINE

## 2020-08-07 PROCEDURE — 83735 ASSAY OF MAGNESIUM: CPT | Performed by: INTERNAL MEDICINE

## 2020-08-07 PROCEDURE — 84145 PROCALCITONIN (PCT): CPT | Performed by: INTERNAL MEDICINE

## 2020-08-07 PROCEDURE — 85025 COMPLETE CBC W/AUTO DIFF WBC: CPT | Performed by: INTERNAL MEDICINE

## 2020-08-07 PROCEDURE — 25010000002 ONDANSETRON PER 1 MG: Performed by: INTERNAL MEDICINE

## 2020-08-07 PROCEDURE — 63710000001 INSULIN DETEMIR PER 5 UNITS: Performed by: INTERNAL MEDICINE

## 2020-08-07 PROCEDURE — 76775 US EXAM ABDO BACK WALL LIM: CPT

## 2020-08-07 PROCEDURE — 63710000001 INSULIN ASPART PER 5 UNITS: Performed by: INTERNAL MEDICINE

## 2020-08-07 PROCEDURE — 82570 ASSAY OF URINE CREATININE: CPT | Performed by: INTERNAL MEDICINE

## 2020-08-07 RX ORDER — NICOTINE POLACRILEX 4 MG
15 LOZENGE BUCCAL
Status: DISCONTINUED | OUTPATIENT
Start: 2020-08-07 | End: 2020-08-11 | Stop reason: HOSPADM

## 2020-08-07 RX ORDER — SODIUM CHLORIDE 450 MG/100ML
100 INJECTION, SOLUTION INTRAVENOUS CONTINUOUS
Status: DISCONTINUED | OUTPATIENT
Start: 2020-08-07 | End: 2020-08-09

## 2020-08-07 RX ORDER — DEXTROSE MONOHYDRATE 25 G/50ML
25 INJECTION, SOLUTION INTRAVENOUS
Status: DISCONTINUED | OUTPATIENT
Start: 2020-08-07 | End: 2020-08-11 | Stop reason: HOSPADM

## 2020-08-07 RX ADMIN — INSULIN DETEMIR 15 UNITS: 100 INJECTION, SOLUTION SUBCUTANEOUS at 21:55

## 2020-08-07 RX ADMIN — PANTOPRAZOLE SODIUM 40 MG: 40 INJECTION, POWDER, FOR SOLUTION INTRAVENOUS at 06:16

## 2020-08-07 RX ADMIN — SODIUM CHLORIDE 10 UNITS/HR: 9 INJECTION, SOLUTION INTRAVENOUS at 02:45

## 2020-08-07 RX ADMIN — INSULIN ASPART 6 UNITS: 100 INJECTION, SOLUTION INTRAVENOUS; SUBCUTANEOUS at 18:17

## 2020-08-07 RX ADMIN — ONDANSETRON 4 MG: 2 INJECTION INTRAMUSCULAR; INTRAVENOUS at 00:06

## 2020-08-07 RX ADMIN — SODIUM CHLORIDE, PRESERVATIVE FREE 10 ML: 5 INJECTION INTRAVENOUS at 00:07

## 2020-08-07 RX ADMIN — SODIUM CHLORIDE 75 ML/HR: 4.5 INJECTION, SOLUTION INTRAVENOUS at 21:49

## 2020-08-07 RX ADMIN — INSULIN DETEMIR 15 UNITS: 100 INJECTION, SOLUTION SUBCUTANEOUS at 10:08

## 2020-08-07 RX ADMIN — SODIUM CHLORIDE 75 ML/HR: 4.5 INJECTION, SOLUTION INTRAVENOUS at 11:21

## 2020-08-07 RX ADMIN — DEXTROSE AND SODIUM CHLORIDE 150 ML/HR: 5; 450 INJECTION, SOLUTION INTRAVENOUS at 05:31

## 2020-08-07 RX ADMIN — SODIUM CHLORIDE, PRESERVATIVE FREE 10 ML: 5 INJECTION INTRAVENOUS at 10:08

## 2020-08-07 RX ADMIN — HEPARIN SODIUM 5000 UNITS: 5000 INJECTION INTRAVENOUS; SUBCUTANEOUS at 06:16

## 2020-08-07 RX ADMIN — HEPARIN SODIUM 5000 UNITS: 5000 INJECTION INTRAVENOUS; SUBCUTANEOUS at 21:55

## 2020-08-07 RX ADMIN — HEPARIN SODIUM 5000 UNITS: 5000 INJECTION INTRAVENOUS; SUBCUTANEOUS at 16:01

## 2020-08-07 RX ADMIN — SODIUM CHLORIDE, PRESERVATIVE FREE 3 ML: 5 INJECTION INTRAVENOUS at 21:57

## 2020-08-07 RX ADMIN — ASPIRIN 81 MG 81 MG: 81 TABLET ORAL at 10:08

## 2020-08-07 RX ADMIN — INSULIN ASPART 2 UNITS: 100 INJECTION, SOLUTION INTRAVENOUS; SUBCUTANEOUS at 11:21

## 2020-08-07 NOTE — CONSULTS
Adult Nutrition  Assessment    Patient Name:  Kenny Lopez  YOB: 1975  MRN: [id number]  Admit Date:  8/5/2020    Assessment Date:  8/7/2020    Comments:  Pt admitted with DKA and Severe sepsis along with HTN, elevated Troponin, and CAP.  Poorly controlled DM with a concern for medication noncompliance per MD.  HEATHER with elevated bun and creat. & Hypernatremia with elevated Na+.  Suspect labs abnormal due to dehydration.  Currently on insulin drip.  Pt reports blood sugars typically 215-300 at home.  RD discussed Basic ADA guidelines and Carbohydrates.  Emphasized the importance of eating 3 meals a day and SMBS.  He voiced understanding.  He needs diet and lifestyle modifications for overall good health.  BMI of 38.62 and pt is 158% of his IBW indicating overweight/overnutrition.  Pt counseled on wt loss.  Diet copies and contact number provided.  Will monitor    Reason for Assessment     Row Name 08/07/20 1327          Reason for Assessment    Reason For Assessment  per organizational policy     Diagnosis  diabetes diagnosis/complications     Identified At Risk by Screening Criteria  need for education         Nutrition/Diet History     Row Name 08/07/20 1327          Nutrition/Diet History    Typical Food/Fluid Intake  Pt claims that his blood sugar usually rung 215-300 on a daily basis.  He does try to follow a diet but probably doesn't do too well           Labs/Tests/Procedures/Meds     Row Name 08/07/20 1334          Labs/Procedures/Meds    Lab Results Reviewed  reviewed, pertinent     Lab Results Comments  Na 152; Bun 64; Creat 2.38; Phos 5.7;        Diagnostic Tests/Procedures    Diagnostic Test/Procedure Reviewed  reviewed, pertinent     Diagnostic Test/Procedures Comments  Insulin drip        Medications    Pertinent Medications Reviewed  reviewed, pertinent     Pertinent Medications Comments  SSI; Levemir; Insulin drip           Estimated/Assessed Needs     Row Name 08/07/20 4215           Calculation Measurements    Weight Used For Calculations  136 kg (300 lb)        Estimated/Assessed Needs    Additional Documentation  Additional Requirements (Group);Fluid Requirements (Group);Moody-St. Jeor Equation (Group);Protein Requirements (Group);Calorie Requirements (Group);KCAL/KG (Group)        Calorie Requirements    Estimated Calorie Need Method  Moody-St Jeor     Estimated Calorie Requirement Comment  2400 for moderate wt loss         Moody-St. Jeor Equation    RMR (Moody-St. Jeor Equation)  2315.794     Moody-St. Jeor Activity Factors  1.4 - 1.5     Activity Factors (Moody-St. Jeor)  3242.1116 - 3473.691        Protein Requirements    Weight Used For Protein Calculations  86.2 kg (190 lb)     Est Protein Requirement Amount (gms/kg)  1.0 gm protein     Estimated Protein Requirements (gms/day)  86.18        Fluid Requirements    Estimated Fluid Requirements (mL/day)  2400     Estimated Fluid Requirement Method  RDA Method     RDA Method (mL)  2400         Nutrition Prescription Ordered     Row Name 08/07/20 1347          Nutrition Prescription PO    Current PO Diet  Clear Liquid     Fluid Consistency  Thin                 Electronically signed by:  Claudette Theodore RD  08/07/20 13:59

## 2020-08-07 NOTE — NURSING NOTE
Records requested and receive via Fax from Dr Dixon's office. Records labeled and placed in bin for scanning

## 2020-08-07 NOTE — PLAN OF CARE
Problem: Patient Care Overview  Goal: Plan of Care Review  Outcome: Ongoing (interventions implemented as appropriate)  Flowsheets (Taken 8/7/2020 6471)  Progress: no change  Plan of Care Reviewed With: caregiver; patient  Outcome Summary: New Assessment:  pt admitted with DKA, Severe Sepsis and CAP.  Currently on an insulin drip.  HEATHER most likely due to volume depletion. Education provided

## 2020-08-07 NOTE — PLAN OF CARE
Problem: Patient Care Overview  Goal: Plan of Care Review  Outcome: Ongoing (interventions implemented as appropriate)  Flowsheets (Taken 8/7/2020 0150)  Plan of Care Reviewed With: patient  Outcome Summary: DKA resolved, transitioned to Sliding scale. VSS. No c/o pain or discomfort.

## 2020-08-07 NOTE — PAYOR COMM NOTE
"        Kimberly Yee RN [facility]  [phone]     Phone  [phone]      Fax  Cont. Stay Revew        Kenny Wong (45 y.o. Male)     Date of Birth Social Security Number Address Home Phone MRN    1975  [address]  [facility] [address] [phone] [id number]    Mu-ism Marital Status          None        Admission Date Admission Type Admitting Provider Attending Provider Department, Room/Bed    8/5/20 Emergency Jed De Luna MD Kirchner, Quinn J, MD [facility] CRITICAL CARE STEPDOWN, 09/A    Discharge Date Discharge Disposition Discharge Destination                       Attending Provider:  Jed De Luna MD    Allergies:  Penicillins    Isolation:  None   Infection:  None   Code Status:  CPR    Ht:  188 cm (74.02\")   Wt:  159 kg (350 lb 5 oz)    Admission Cmt:  None   Principal Problem:  Diabetic ketoacidosis without coma (CMS/HCC) [E11.10]                 Active Insurance as of 8/5/2020     Primary Coverage     Payor Plan Insurance Group Employer/Plan Group    [employer] [facility] [place]      Payor Plan Address Payor Plan Phone Number Payor Plan Fax Number Effective Dates    [address] [address]   3/1/2020 - None Entered    [place] [address]       Subscriber Name Subscriber Birth Date Member ID       KENNY WONG 1975 [id number]                 Emergency Contacts      (Rel.) Home Phone Work Phone Mobile Phone    Salina Wong (Spouse) [phone] -- [phone]            Vital Signs (last day)     Date/Time   Temp   Temp src   Pulse   Resp   BP   Patient Position   SpO2    08/07/20 0600   --   --   118   18   118/79   Lying   98    08/07/20 0500   --   --   106   --   116/63   --   96    08/07/20 0400   --   --   93   --   122/80   --   96    08/07/20 0300   98.6 (37)   Temporal   98   18   117/73   --   95    08/07/20 0200   --   --   113   18   109/67   --   96    " 08/07/20 0100   --   --   109   20   116/66   --   95    08/07/20 0000   98 (36.7)   Temporal   99   18   109/56   Lying   93    08/06/20 2345   --   --   100   --   110/55   --   94    08/06/20 2220   --   --   (!) 122   18   133/80   --   98    08/06/20 2100   --   --   111   16   137/78   Lying   93    08/06/20 2016   97.8 (36.6)   Temporal   103   18   133/94   Lying   97    08/06/20 1900   --   --   97   18   104/68   Lying   94    08/06/20 1833   --   --   103   --   --   --   94    08/06/20 1802   --   --   --   --   119/61   --   --    08/06/20 1800   --   --   117   --   --   --   94    08/06/20 1701   --   --   --   --   125/72   --   --    08/06/20 1700   98.9 (37.2)   Oral   --   --   --   --   --    08/06/20 1645   --   --   110   --   --   --   96    08/06/20 1630   --   --   112   --   --   --   95    08/06/20 1615   --   --   95   --   --   --   93    08/06/20 1601   --   --   --   --   135/60   --   --    08/06/20 1545   --   --   87   --   127/56   --   95    08/06/20 1530   --   --   92   --   134/65   --   93    08/06/20 1515   98.8 (37.1)   Oral   92   --   133/79   --   94    08/06/20 1500   --   --   93   --   --   --   96    08/06/20 1430   --   --   92   --   130/69   --   99    08/06/20 1400   --   --   88   --   124/69   Lying   94    08/06/20 1300   --   --   84   --   101/68   Lying   94    08/06/20 1230   --   --   88   --   104/65   Lying   94    08/06/20 1200   98.8 (37.1)   Oral   86   22   96/55   --   --    08/06/20 1127   --   --   90   22   103/73   Lying   98    08/06/20 1100   --   --   92   --   98/70   --   --    08/06/20 1030   --   --   96   --   96/59   Lying   94    08/06/20 1000   98.6 (37)   Oral   76   18   100/76   Lying   97    08/06/20 0931   --   --   80   --   93/55   Lying   --    08/06/20 0900   --   --   92   --   (!) 83/52   Lying   --    08/06/20 0830   --   --   92   --   (!) 82/52   Lying   97    08/06/20 08:01:30   98.1 (36.7)   Oral   93   20   (!) 88/50    Sitting   98    08/06/20 0726   --   --   94   (!) 32   (!) 79/48   --   98    08/06/20 0700   --   --   102   --   (!) 80/48   --   97    08/06/20 0653   --   --   102   --   (!) 74/51   --   98    08/06/20 0607   --   --   106   --   107/57   --   98    08/06/20 0537   --   --   110   --   (!) 83/46   --   97    08/06/20 0530   --   --   110   --   --   --   98    08/06/20 0500   --   --   110   --   --   --   97    08/06/20 0448   --   --   108   --   115/57   --   94    08/06/20 0401   --   --   110   --   (!) 75/44   --   --    08/06/20 0400   --   --   110   --   (!) 75/44   --   97    08/06/20 0330   --   --   110   --   (!) 83/52   --   98    08/06/20 0300   --   --   108   --   (!) 78/43   --   99    08/06/20 0230   --   --   108   --   94/55   --   99    08/06/20 0200   --   --   106   --   110/69   --   97    08/06/20 0130   --   --   102   --   109/70   --   96    08/06/20 0129   --   --   --   --   --   --   97    08/06/20 0101   --   --   102   24   (!) 88/54   --   99    08/06/20 0040   --   --   100   --   (!) 85/60   --   100              Current Facility-Administered Medications   Medication Dose Route Frequency Provider Last Rate Last Dose   • acetaminophen (TYLENOL) tablet 650 mg  650 mg Oral Q4H PRN Jed De Luna MD       • aspirin chewable tablet 81 mg  81 mg Oral Daily Jed De Luna MD   81 mg at 08/07/20 1008   • dextrose (D50W) 25 g/ 50mL Intravenous Solution 25 g  25 g Intravenous Q15 Min PRN Trent Celeste MD       • dextrose (GLUTOSE) oral gel 15 g  15 g Oral Q15 Min PRN Trent Celeste MD       • dextrose 5 % and sodium chloride 0.45 % infusion  150 mL/hr Intravenous Continuous PRN Trent Celeste  mL/hr at 08/07/20 1000 100 mL/hr at 08/07/20 1000   • glucagon (human recombinant) (GLUCAGEN DIAGNOSTIC) injection 1 mg  1 mg Subcutaneous Q15 Min PRN Trent Celeste MD       • heparin (porcine) 5000 UNIT/ML injection 5,000 Units  5,000 Units Subcutaneous  Q8H Jed De Luna MD   5,000 Units at 08/07/20 0616   • insulin aspart (novoLOG) injection 0-9 Units  0-9 Units Subcutaneous TID AC Trent Celeste MD       • insulin detemir (LEVEMIR) injection 15 Units  15 Units Subcutaneous Q12H Trent Celeste MD       • insulin regular (HumuLIN R,NovoLIN R) 100 Units in sodium chloride 0.9 % 100 mL (1 Units/mL) infusion  12 Units/hr Intravenous Titrated Trent Celeste MD 8 mL/hr at 08/07/20 1000 8 Units/hr at 08/07/20 1000   • ondansetron (ZOFRAN) injection 4 mg  4 mg Intravenous Q6H PRN Jed De Luna MD   4 mg at 08/07/20 0006   • pantoprazole (PROTONIX) injection 40 mg  40 mg Intravenous Q AM Jed De Luna MD   40 mg at 08/07/20 0616   • sodium chloride 0.45 % infusion  75 mL/hr Intravenous Continuous Lico Salvador MD       • sodium chloride 0.9 % flush 10 mL  10 mL Intravenous PRN Jed De Luna MD       • sodium chloride 0.9 % flush 10 mL  10 mL Intravenous Q12H Jed De Luna MD   10 mL at 08/07/20 1008   • sodium chloride 0.9 % flush 10 mL  10 mL Intravenous PRN Jed De Luna MD   10 mL at 08/07/20 0007   • sodium chloride 0.9 % flush 10 mL  10 mL Intravenous Once PRN Jed De Luna MD       • sodium chloride 0.9 % flush 3 mL  3 mL Intravenous Q12H Jed De Luna MD   3 mL at 08/06/20 2021   • sodium chloride 0.9 % infusion  10 mL/hr Intravenous Continuous Jed Clements MD         Lab Results (last 24 hours)     Procedure Component Value Units Date/Time    Urine Culture - Urine, Urine, Clean Catch [[id number]] Collected:  08/06/20 0443    Specimen:  Urine, Clean Catch Updated:  08/07/20 0847     Urine Culture 50,000 CFU/mL Mixed Austin Isolated    Narrative:       Specimen contains mixed organisms of questionable pathogenicity which indicates contamination with commensal austin.  Further identification is unlikely to provide clinically useful information.  Suggest recollection.    Hemoglobin A1C - Miscellaneous  Test [[id number]] Collected:  08/05/20 1811    Specimen:  Blood Updated:  08/07/20 0845     Miscellaneous Lab Test Result See attached report    Basic Metabolic Panel [[id number]]  (Abnormal) Collected:  08/07/20 0815    Specimen:  Blood Updated:  08/07/20 0845     Glucose 115 mg/dL      BUN 59 mg/dL      Creatinine 2.53 mg/dL      Sodium 152 mmol/L      Potassium 4.3 mmol/L      Comment: Specimen hemolyzed.  Results may be affected.        Chloride 119 mmol/L      CO2 23.0 mmol/L      Calcium 9.4 mg/dL      eGFR  African Amer 34 mL/min/1.73      BUN/Creatinine Ratio 23.3     Anion Gap 10.0 mmol/L     Narrative:       GFR Normal >60  Chronic Kidney Disease <60  Kidney Failure <15      POC Glucose Once [[id number]]  (Abnormal) Collected:  08/07/20 0826    Specimen:  Blood Updated:  08/07/20 0837     Glucose 170 mg/dL      Comment: Result Not ConfirmedOperator: [id number] LERMAPERCY LORENZANAGlory ID: [id number]       POC Glucose Once [[id number]]  (Normal) Collected:  08/07/20 0708    Specimen:  Blood Updated:  08/07/20 0723     Glucose 122 mg/dL      Comment: : [id number] [employer] ID: [id number]       POC Glucose Once [[id number]]  (Abnormal) Collected:  08/07/20 0601    Specimen:  Blood Updated:  08/07/20 0636     Glucose 307 mg/dL      Comment: : [id number] [employer] ID: [id number]       Procalcitonin [[id number]]  (Abnormal) Collected:  08/07/20 0416    Specimen:  Blood Updated:  08/07/20 0525     Procalcitonin 0.38 ng/mL     Narrative:       As a Marker for Sepsis (Non-Neonates):   1. <0.5 ng/mL represents a low risk of severe sepsis and/or septic shock.  1. >2 ng/mL represents a high risk of severe sepsis and/or septic shock.    As a Marker for Lower Respiratory Tract Infections that require antibiotic therapy:  PCT on Admission     Antibiotic Therapy             6-12 Hrs later  > 0.5                Strongly Recommended            >0.25 - <0.5         Recommended  0.1 - 0.25     "       Discouraged                   Remeasure/reassess PCT  <0.1                 Strongly Discouraged          Remeasure/reassess PCT      As 28 day mortality risk marker: \"Change in Procalcitonin Result\" (> 80 % or <=80 %) if Day 0 (or Day 1) and Day 4 values are available. Refer to http://www.[facility]-pct-calculator.com/   Change in PCT <=80 %   A decrease of PCT levels below or equal to 80 % defines a positive change in PCT test result representing a higher risk for 28-day all-cause mortality of patients diagnosed with severe sepsis or septic shock.  Change in PCT > 80 %   A decrease of PCT levels of more than 80 % defines a negative change in PCT result representing a lower risk for 28-day all-cause mortality of patients diagnosed with severe sepsis or septic shock.                Results may be falsely decreased if patient taking Biotin.     Basic Metabolic Panel [[id number]]  (Abnormal) Collected:  08/07/20 0416    Specimen:  Blood Updated:  08/07/20 0524     Glucose 203 mg/dL      BUN 64 mg/dL      Creatinine 2.38 mg/dL      Sodium 153 mmol/L      Potassium 4.2 mmol/L      Chloride 119 mmol/L      CO2 25.0 mmol/L      Calcium 9.5 mg/dL      eGFR  African Amer 36 mL/min/1.73      BUN/Creatinine Ratio 26.9     Anion Gap 9.0 mmol/L     Narrative:       GFR Normal >60  Chronic Kidney Disease <60  Kidney Failure <15      Magnesium [[id number]]  (Normal) Collected:  08/07/20 0416    Specimen:  Blood Updated:  08/07/20 0524     Magnesium 2.1 mg/dL     POC Glucose Once [[id number]]  (Abnormal) Collected:  08/07/20 0441    Specimen:  Blood Updated:  08/07/20 0510     Glucose 176 mg/dL      Comment: : [id number] COLLIN PENAEDMONDNIIforrest ID: [id number]       CBC & Differential [[id number]] Collected:  08/07/20 0416    Specimen:  Blood Updated:  08/07/20 0455    Narrative:       The following orders were created for panel order CBC & Differential.  Procedure                               Abnormality         Status      "                ---------                               -----------         ------                     CBC Auto Differential[[id number]]        Abnormal            Final result                 Please view results for these tests on the individual orders.    CBC Auto Differential [[id number]]  (Abnormal) Collected:  08/07/20 0416    Specimen:  Blood Updated:  08/07/20 0455     WBC 10.30 10*3/mm3      RBC 4.78 10*6/mm3      Hemoglobin 13.2 g/dL      Hematocrit 41.2 %      MCV 86.2 fL      MCH 27.6 pg      MCHC 32.0 g/dL      RDW 13.4 %      RDW-SD 41.5 fl      MPV 11.6 fL      Platelets 192 10*3/mm3      Neutrophil % 70.4 %      Lymphocyte % 17.5 %      Monocyte % 11.0 %      Eosinophil % 0.3 %      Basophil % 0.4 %      Immature Grans % 0.4 %      Neutrophils, Absolute 7.26 10*3/mm3      Lymphocytes, Absolute 1.80 10*3/mm3      Monocytes, Absolute 1.13 10*3/mm3      Eosinophils, Absolute 0.03 10*3/mm3      Basophils, Absolute 0.04 10*3/mm3      Immature Grans, Absolute 0.04 10*3/mm3      nRBC 0.0 /100 WBC     POC Glucose Once [[id number]]  (Abnormal) Collected:  08/07/20 0334    Specimen:  Blood Updated:  08/07/20 0348     Glucose 170 mg/dL      Comment: : [id number] [employer] ID: [id number]       POC Glucose Once [[id number]]  (Abnormal) Collected:  08/07/20 0244    Specimen:  Blood Updated:  08/07/20 0310     Glucose 226 mg/dL      Comment: : [id number] [employer] ID: [id number]       POC Glucose Once [[id number]]  (Abnormal) Collected:  08/07/20 0106    Specimen:  Blood Updated:  08/07/20 0132     Glucose 224 mg/dL      Comment: : [id number] [employer] ID: [id number]       Basic Metabolic Panel [[id number]]  (Abnormal) Collected:  08/07/20 0027    Specimen:  Blood Updated:  08/07/20 0058     Glucose 272 mg/dL      BUN 67 mg/dL      Creatinine 2.76 mg/dL      Sodium 151 mmol/L      Potassium 4.1 mmol/L      Chloride 118 mmol/L      CO2 23.0 mmol/L       Calcium 9.2 mg/dL      eGFR  African Amer 30 mL/min/1.73      BUN/Creatinine Ratio 24.3     Anion Gap 10.0 mmol/L     Narrative:       GFR Normal >60  Chronic Kidney Disease <60  Kidney Failure <15      POC Glucose Once [[id number]]  (Abnormal) Collected:  08/07/20 0003    Specimen:  Blood Updated:  08/07/20 0027     Glucose 285 mg/dL      Comment: : [id number] [employer] ID: [id number]       POC Glucose Once [[id number]]  (Abnormal) Collected:  08/06/20 2223    Specimen:  Blood Updated:  08/06/20 2236     Glucose 249 mg/dL      Comment: : [id number] URBANARAHANYMeter ID: [id number]       POC Glucose Once [[id number]]  (Abnormal) Collected:  08/06/20 2128    Specimen:  Blood Updated:  08/06/20 2141     Glucose 190 mg/dL      Comment: : [id number] [employer] ID: [id number]       POC Glucose Once [[id number]]  (Abnormal) Collected:  08/06/20 2018    Specimen:  Blood Updated:  08/06/20 2140     Glucose 288 mg/dL      Comment: : [id number] [employer] ID: [id number]       Basic Metabolic Panel [[id number]]  (Abnormal) Collected:  08/06/20 2008    Specimen:  Blood Updated:  08/06/20 2034     Glucose 281 mg/dL      BUN 69 mg/dL      Creatinine 3.11 mg/dL      Sodium 152 mmol/L      Potassium 4.6 mmol/L      Chloride 118 mmol/L      CO2 23.0 mmol/L      Calcium 9.6 mg/dL      eGFR  African Amer 26 mL/min/1.73      BUN/Creatinine Ratio 22.2     Anion Gap 11.0 mmol/L     Narrative:       GFR Normal >60  Chronic Kidney Disease <60  Kidney Failure <15      Blood Culture - Blood, Wrist, Left [[id number]] Collected:  08/05/20 1927    Specimen:  Blood from Wrist, Left Updated:  08/06/20 1930     Blood Culture No growth at 24 hours    Lactate Dehydrogenase [[id number]]  (Abnormal) Collected:  08/06/20 1826    Specimen:  Blood Updated:  08/06/20 1907      U/L     Basic Metabolic Panel [[id number]]  (Abnormal) Collected:  08/06/20 1826    Specimen:  Blood  Updated:  08/06/20 1907     Glucose 253 mg/dL      BUN 72 mg/dL      Creatinine 3.16 mg/dL      Sodium 152 mmol/L      Potassium 4.8 mmol/L      Chloride 114 mmol/L      CO2 24.0 mmol/L      Calcium 10.0 mg/dL      eGFR  African Amer 26 mL/min/1.73      BUN/Creatinine Ratio 22.8     Anion Gap 14.0 mmol/L     Narrative:       GFR Normal >60  Chronic Kidney Disease <60  Kidney Failure <15      Troponin [[id number]]  (Abnormal) Collected:  08/06/20 1826    Specimen:  Blood Updated:  08/06/20 1906     Troponin T 0.485 ng/mL     Narrative:       Troponin T Reference Range:  <= 0.03 ng/mL-   Negative for AMI  >0.03 ng/mL-     Abnormal for myocardial necrosis.  Clinicians would have to utilize clinical acumen, EKG, Troponin and serial changes to determine if it is an Acute Myocardial Infarction or myocardial injury due to an underlying chronic condition.       Results may be falsely decreased if patient taking Biotin.      POC Glucose Once [[id number]]  (Abnormal) Collected:  08/06/20 1839    Specimen:  Blood Updated:  08/06/20 1854     Glucose 226 mg/dL      Comment: : [id number] [facility] REBECCAMeter ID: [id number]       POC Glucose Once [[id number]]  (Abnormal) Collected:  08/06/20 1743    Specimen:  Blood Updated:  08/06/20 1854     Glucose 171 mg/dL      Comment: : [id number] [facility] REBECCAMeter ID: [id number]       Procalcitonin [[id number]]  (Abnormal) Collected:  08/06/20 1826    Specimen:  Blood Updated:  08/06/20 1853     Procalcitonin 0.65 ng/mL     Narrative:       As a Marker for Sepsis (Non-Neonates):   1. <0.5 ng/mL represents a low risk of severe sepsis and/or septic shock.  1. >2 ng/mL represents a high risk of severe sepsis and/or septic shock.    As a Marker for Lower Respiratory Tract Infections that require antibiotic therapy:  PCT on Admission     Antibiotic Therapy             6-12 Hrs later  > 0.5                Strongly Recommended            >0.25 - <0.5         Recommended  0.1 - 0.25    "        Discouraged                   Remeasure/reassess PCT  <0.1                 Strongly Discouraged          Remeasure/reassess PCT      As 28 day mortality risk marker: \"Change in Procalcitonin Result\" (> 80 % or <=80 %) if Day 0 (or Day 1) and Day 4 values are available. Refer to http://www.[facility]-bitespct-calculator.com/   Change in PCT <=80 %   A decrease of PCT levels below or equal to 80 % defines a positive change in PCT test result representing a higher risk for 28-day all-cause mortality of patients diagnosed with severe sepsis or septic shock.  Change in PCT > 80 %   A decrease of PCT levels of more than 80 % defines a negative change in PCT result representing a lower risk for 28-day all-cause mortality of patients diagnosed with severe sepsis or septic shock.                Results may be falsely decreased if patient taking Biotin.     Blood Culture - Blood, Arm, Right [[id number]] Collected:  08/05/20 1811    Specimen:  Blood from Arm, Right Updated:  08/06/20 1815     Blood Culture No growth at 24 hours    POC Glucose Once [[id number]]  (Normal) Collected:  08/06/20 1625    Specimen:  Blood Updated:  08/06/20 1654     Glucose 128 mg/dL      Comment: Result Not ConfirmedOperator: [id number] [facility] REBECCAMeter ID: [id number]       POC Glucose Once [[id number]]  (Abnormal) Collected:  08/06/20 1505    Specimen:  Blood Updated:  08/06/20 1631     Glucose 139 mg/dL      Comment: : [id number] [facility] REBECCAM[facility] ID: [id number]       POC Glucose Once [[id number]]  (Abnormal) Collected:  08/06/20 1407    Specimen:  Blood Updated:  08/06/20 1421     Glucose 131 mg/dL      Comment: : [id number] [employer] HEATHERMeter ID: [id number]       POC Glucose Once [[id number]]  (Abnormal) Collected:  08/06/20 1004    Specimen:  Blood Updated:  08/06/20 1341     Glucose 52 mg/dL      Comment: : [id number] [employer] HEATHERMeter ID: [id number]       POC Glucose Once [[id number]]  (Abnormal) Collected:  " 08/06/20 0305    Specimen:  Blood Updated:  08/06/20 1341     Glucose 460 mg/dL      Comment: : [id number] RAYA  KAYLAMeter ID: [id number]       POC Glucose Once [[id number]]  (Abnormal) Collected:  08/06/20 0202    Specimen:  Blood Updated:  08/06/20 1340     Glucose 572 mg/dL      Comment: : [id number] RAYA  KAYLAMeter ID: [id number]       POC Glucose Once [[id number]]  (Abnormal) Collected:  08/06/20 0108    Specimen:  Blood Updated:  08/06/20 1340     Glucose 584 mg/dL      Comment: : [id number] RAYA  KAYLAMeter ID: [id number]       POC Glucose Once [[id number]]  (Abnormal) Collected:  08/06/20 0009    Specimen:  Blood Updated:  08/06/20 1340     Glucose 590 mg/dL      Comment: : [id number] RAYA  KAYLAMeter ID: [id number]       POC Glucose Once [[id number]]  (Normal) Collected:  08/06/20 1306    Specimen:  Blood Updated:  08/06/20 1319     Glucose 127 mg/dL      Comment: : [id number] [employer] HEATHERMeter ID: [id number]       POC Glucose Once [[id number]]  (Abnormal) Collected:  08/06/20 1206    Specimen:  Blood Updated:  08/06/20 1221     Glucose 205 mg/dL      Comment: : [id number] [employer]VET[employer] HEATHERMeter ID: [id number]       POC Glucose Once [[id number]]  (Abnormal) Collected:  08/06/20 1114    Specimen:  Blood Updated:  08/06/20 1221     Glucose 149 mg/dL      Comment: : [id number] [employer] HEATHERMeter ID: [id number]       Troponin [[id number]]  (Abnormal) Collected:  08/06/20 1115    Specimen:  Blood Updated:  08/06/20 1155     Troponin T 0.751 ng/mL     Narrative:       Troponin T Reference Range:  <= 0.03 ng/mL-   Negative for AMI  >0.03 ng/mL-     Abnormal for myocardial necrosis.  Clinicians would have to utilize clinical acumen, EKG, Troponin and serial changes to determine if it is an Acute Myocardial Infarction or myocardial injury due to an underlying chronic condition.       Results may be falsely decreased if patient taking  Biotin.      Basic Metabolic Panel [[id number]]  (Abnormal) Collected:  08/06/20 1115    Specimen:  Blood Updated:  08/06/20 1154     Glucose 149 mg/dL      BUN 78 mg/dL      Creatinine 3.77 mg/dL      Sodium 152 mmol/L      Potassium 4.7 mmol/L      Comment: Specimen hemolyzed.  Results may be affected.        Chloride 115 mmol/L      CO2 23.0 mmol/L      Calcium 10.0 mg/dL      eGFR  African Amer 21 mL/min/1.73      BUN/Creatinine Ratio 20.7     Anion Gap 14.0 mmol/L     Narrative:       GFR Normal >60  Chronic Kidney Disease <60  Kidney Failure <15          Imaging Results (Last 24 Hours)     Procedure Component Value Units Date/Time    XR Chest PA & Lateral [[id number]] Collected:  08/07/20 0735     Updated:  08/07/20 0813    Narrative:       EXAM: XR CHEST 2 VIEWS    COMPARISONS: Radiograph 8/5/2020    FINDINGS:   Lungs are adequately expanded. No consolidation, effusion,  pneumothorax. The right lower lobe appears well aerated on  today's exam. Cardiomegaly is unchanged. No acute osseous  abnormality. Dual-lead cardiac pacer is again seen.      Impression:       No acute cardiopulmonary process.    Electronically signed by:  Lauren Hi MD  8/7/2020  8:12 AM CDT Workstation: [id number]        Operative/Procedure Notes (last 24 hours) (Notes from 08/06/20 1119 through 08/07/20 1119)    No notes of this type exist for this encounter.            Physician Progress Notes (last 48 hours) (Notes from 08/05/20 1119 through 08/07/20 1119)      Trent Celeste MD at 08/07/20 0823              [facility]  INPATIENT PROGRESS NOTE    Length of Stay: 2  Date of Admission: 8/5/2020  Primary Care Physician: Maninder Dixon MD    Subjective   (S) negative for fever and nausea, vomiting; found him sitting on the edge of the bed    Review of Systems   Constitutional: Negative for fever.   HENT: Negative for congestion and trouble swallowing.    Eyes:  Negative for redness.   Respiratory: Negative for chest tightness and shortness of breath.    Cardiovascular: Negative for chest pain.   Genitourinary: Negative for difficulty urinating.   Musculoskeletal: Negative for arthralgias.   Neurological: Negative for dizziness and headaches.   Psychiatric/Behavioral: Negative for agitation and behavioral problems.     All pertinent negatives and positives are as above. All other systems have been reviewed and are negative unless otherwise stated.     Prior to Admission medications    Medication Sig Start Date End Date Taking? Authorizing Provider   amLODIPine (NORVASC) 10 MG tablet amlodipine 10 mg tablet    Beth Cho MD   aspirin 81 MG chewable tablet aspirin 81 mg chewable tablet    Beth Cho MD   carvedilol (COREG) 25 MG tablet carvedilol 25 mg tablet    Beth Cho MD   Ergocalciferol (VITAMIN D2 PO) Vitamin D2 1,250 mcg (50,000 unit) capsule    Beth Cho MD   hydroCHLOROthiazide (HYDRODIURIL) 25 MG tablet hydrochlorothiazide 25 mg tablet    Beth Cho MD   Insulin Glargine (BASAGLAR KWIKPEN SC) Basaglar KwikPen U-100 Insulin 100 unit/mL (3 mL) subcutaneous    Beth Cho MD   isosorbide mononitrate (IMDUR) 30 MG 24 hr tablet isosorbide mononitrate ER 30 mg tablet,extended release 24 hr   Take 1 tablet every day by oral route.    Beth Cho MD   metFORMIN (GLUCOPHAGE) 1000 MG tablet metformin 1,000 mg tablet    Beth Cho MD   methocarbamol (ROBAXIN) 500 MG tablet methocarbamol 500 mg tablet   Take 1 tablet every day by oral route.    Beth Cho MD   raNITIdine (ZANTAC) 150 MG tablet ranitidine 150 mg tablet    Beth Cho MD         aspirin 81 mg Oral Daily   heparin (porcine) 5,000 Units Subcutaneous Q8H   insulin aspart 0-9 Units Subcutaneous TID AC   insulin detemir 10 Units Subcutaneous Q12H   pantoprazole 40 mg Intravenous Q AM   sodium chloride 10 mL  Intravenous Q12H   sodium chloride 3 mL Intravenous Q12H       dextrose 5 % and sodium chloride 0.45 % 150 mL/hr Last Rate: 150 mL/hr (08/07/20 0531)   insulin 12 Units/hr Last Rate: 12 Units/hr (08/07/20 0709)   lactated ringers 250 mL/hr Last Rate: Stopped (08/06/20 0800)   sodium chloride 10 mL/hr        Objective    Temp:  [97.8 °F (36.6 °C)-98.9 °F (37.2 °C)] 98.6 °F (37 °C)  Heart Rate:  [] 118  Resp:  [16-22] 18  BP: ()/(52-94) 118/79    Physical Exam  Obese patient, no in acute distress  Neck is supple; EOMI; oral mucous moist  Normal expansion of bilateral lungs; CTA x 2  Normal sinus rhythm; no murmurs  Abdomen globose, no tender to palpation; normal bowel sounds; hydrocele present; no inflammatory signs  Able to move upper and lower extremities  Alert and oriented x 4; no neuro focalization  He is calm, well behaved      Results Review:  I have reviewed the labs, radiology results, and diagnostic studies.    Laboratory Data:   Results from last 7 days   Lab Units 08/07/20  0416 08/07/20  0027 08/06/20 2008 08/05/20  1927   SODIUM mmol/L 153* 151* 152*   < > 140   POTASSIUM mmol/L 4.2 4.1 4.6   < > 6.4*   CHLORIDE mmol/L 119* 118* 118*   < > 102   CO2 mmol/L 25.0 23.0 23.0   < > 20.0*   BUN mg/dL 64* 67* 69*   < > 71*   CREATININE mg/dL 2.38* 2.76* 3.11*   < > 2.83*   GLUCOSE mg/dL 203* 272* 281*   < > 1,116*   CALCIUM mg/dL 9.5 9.2 9.6   < > 9.7   BILIRUBIN mg/dL  --   --   --   --  0.2   ALK PHOS U/L  --   --   --   --  105   ALT (SGPT) U/L  --   --   --   --  18   AST (SGOT) U/L  --   --   --   --  21   ANION GAP mmol/L 9.0 10.0 11.0   < > 18.0*    < > = values in this interval not displayed.     Estimated Creatinine Clearance: 62.6 mL/min (A) (by C-G formula based on SCr of 2.38 mg/dL (H)).  Results from last 7 days   Lab Units 08/07/20  0416 08/06/20  0354 08/05/20  1927 08/05/20  1811   MAGNESIUM mg/dL 2.1 1.9 2.6 3.2*   PHOSPHORUS mg/dL  --   --  5.7* 6.7*         Results from last 7  days   Lab Units 08/07/20  0416 08/06/20  0354 08/05/20  1811   WBC 10*3/mm3 10.30 13.72* 16.03*   HEMOGLOBIN g/dL 13.2 11.9* 16.1   HEMATOCRIT % 41.2 37.4* 50.5   PLATELETS 10*3/mm3 192 174 243     Results from last 7 days   Lab Units 08/05/20  1811   INR  1.06       Culture Data:   Blood Culture   Date Value Ref Range Status   08/05/2020 No growth at 24 hours  Preliminary   08/05/2020 No growth at 24 hours  Preliminary     No results found for: URINECX  No results found for: RESPCX  No results found for: WOUNDCX  No results found for: STOOLCX  No components found for: BODYFLD    Radiology Data:   Imaging Results (Last 24 Hours)     Procedure Component Value Units Date/Time    XR Chest PA & Lateral [[id number]] Collected:  08/07/20 0735     Updated:  08/07/20 0813    Narrative:       EXAM: XR CHEST 2 VIEWS    COMPARISONS: Radiograph 8/5/2020    FINDINGS:   Lungs are adequately expanded. No consolidation, effusion,  pneumothorax. The right lower lobe appears well aerated on  today's exam. Cardiomegaly is unchanged. No acute osseous  abnormality. Dual-lead cardiac pacer is again seen.      Impression:       No acute cardiopulmonary process.    Electronically signed by:  Lauren Hi MD  8/7/2020  8:12 AM CDT Workstation: [id number]          I have reviewed the patient's current medications.     Assessment/Plan   Combination of DKA and HHS     In the setting of diabetes mellitus type 2     Anion gap has closed now, will transition in the next 4 hours, since he still using high insulin rate; discussed with RN when glucose levels is high, to decrease the rate of the D5W1/2SS rate instead of increasing the insulin rate;      Plan to wean insulin and IVFs in the next 4 hours; will start him on long acting insulin and SSI     Check A1C am    Acute kidney injury on chronic kidney disease     Patient stated that he was discharge from [facility] in the first days of July/20 and he was told to follow up with a  nephrologist but he didn't      Cr is trending down     Discussed with nephrologist    Troponin elevated     I don't see the need at this time to consult cardiology since patient has been asymptomatic and likely his troponin levels were related to his acidotic state and his acute kidney injury; discussed this with cardiology, Dr Rowland, who concurs with this above statement and no further work up would need to be done and that I needed to talk with nephrologist    Sepsis?     Being treated for PNA; ruled out; sepsis ruled out     Doubtful; x ray 2 views with no acute disease; procalcitonin trending down; discontinue antibiotics    Discharge Planning: I expect patient to be discharged to home in 1 to 2 days    Trent Celeste MD      Electronically signed by Trent Celeste MD at 08/07/20 0924     Trent Celeste MD at 08/06/20 1731              [facility] Medicine Services  INPATIENT PROGRESS NOTE    Length of Stay: 1  Date of Admission: 8/5/2020  Primary Care Physician: Maninder Dixon MD    Subjective   (S) negative for fever and nausea, vomiting; he stated that he feels slightly better    Review of Systems   Constitutional: Negative for fever.   HENT: Negative for congestion and trouble swallowing.    Eyes: Negative for redness.   Respiratory: Negative for chest tightness and shortness of breath.    Cardiovascular: Negative for chest pain.   Genitourinary: Negative for difficulty urinating.   Musculoskeletal: Negative for arthralgias.   Neurological: Negative for dizziness and headaches.   Psychiatric/Behavioral: Negative for agitation and behavioral problems.     All pertinent negatives and positives are as above. All other systems have been reviewed and are negative unless otherwise stated.     Prior to Admission medications    Medication Sig Start Date End Date Taking? Authorizing Provider   amLODIPine (NORVASC) 10 MG tablet amlodipine 10 mg tablet    Provider,  MD Beth   aspirin 81 MG chewable tablet aspirin 81 mg chewable tablet    Beth Cho MD   carvedilol (COREG) 25 MG tablet carvedilol 25 mg tablet    Beth Cho MD   Ergocalciferol (VITAMIN D2 PO) Vitamin D2 1,250 mcg (50,000 unit) capsule    Beth Cho MD   hydroCHLOROthiazide (HYDRODIURIL) 25 MG tablet hydrochlorothiazide 25 mg tablet    Beth Cho MD   Insulin Glargine (BASAGLAR KWIKPEN SC) Basaglar KwikPen U-100 Insulin 100 unit/mL (3 mL) subcutaneous    Beth Cho MD   isosorbide mononitrate (IMDUR) 30 MG 24 hr tablet isosorbide mononitrate ER 30 mg tablet,extended release 24 hr   Take 1 tablet every day by oral route.    Beth Cho MD   metFORMIN (GLUCOPHAGE) 1000 MG tablet metformin 1,000 mg tablet    Beth Cho MD   methocarbamol (ROBAXIN) 500 MG tablet methocarbamol 500 mg tablet   Take 1 tablet every day by oral route.    Beth Cho MD   raNITIdine (ZANTAC) 150 MG tablet ranitidine 150 mg tablet    Beth Cho MD         aspirin 81 mg Oral Daily   azithromycin 500 mg Oral Q24H   cefTRIAXone 2 g Intravenous Q24H   heparin (porcine) 5,000 Units Subcutaneous Q8H   pantoprazole 40 mg Intravenous Q AM   sodium chloride 10 mL Intravenous Q12H   sodium chloride 3 mL Intravenous Q12H       dextrose 5 % and sodium chloride 0.45 % 150 mL/hr    dextrose 5 % and sodium chloride 0.45 % with KCl 20 mEq/L 150 mL/hr Last Rate: 150 mL/hr (08/06/20 1208)   dextrose 5 % and sodium chloride 0.45 % with KCl 40 mEq/L 150 mL/hr    dextrose 5 % and sodium chloride 0.9 % 150 mL/hr    dextrose 5 % and sodium chloride 0.9 % with KCl 20 mEq 150 mL/hr    dextrose 5% and sodium chloride 0.9% with KCl 40 mEq/L 150 mL/hr    insulin 13 Units/hr Last Rate: 3 Units/hr (08/06/20 1625)   lactated ringers 250 mL/hr Last Rate: 250 mL/hr (08/06/20 0706)   custom IV KCl infusion builder 250 mL/hr    sodium chloride 250 mL/hr    sodium chloride  0.45 % with KCl 20 mEq 250 mL/hr    sodium chloride 250 mL/hr    sodium chloride 10 mL/hr    sodium chloride 0.9 % with KCl 20 mEq 250 mL/hr    sodium chloride 0.9 % with KCl 40 mEq/L 250 mL/hr        Objective    Temp:  [98.1 °F (36.7 °C)-98.8 °F (37.1 °C)] 98.8 °F (37.1 °C)  Heart Rate:  [] 110  Resp:  [18-32] 22  BP: ()/(43-79) 135/60    Physical Exam  Obese patient, no in acute distress  Neck is supple; EOMI; oral mucous moist  Normal expansion of bilateral lungs; CTA x 2  Normal sinus rhythm; no murmurs  Abdomen globose, no tender to palpation; normal bowel sounds  Able to move upper and lower extremities  Alert and oriented x 4; no neuro focalization  He is calm, well behaved      Results Review:  I have reviewed the labs, radiology results, and diagnostic studies.    Laboratory Data:   Results from last 7 days   Lab Units 08/06/20  1115 08/06/20 0354 08/05/20 2225 08/05/20 1927   SODIUM mmol/L 152* 149* 141 140   POTASSIUM mmol/L 4.7 4.6 6.6* 6.4*   CHLORIDE mmol/L 115* 112* 103 102   CO2 mmol/L 23.0 18.0* 16.0* 20.0*   BUN mg/dL 78* 61* 79* 71*   CREATININE mg/dL 3.77* 2.76* 3.35* 2.83*   GLUCOSE mg/dL 149* 284* 994* 1,116*   CALCIUM mg/dL 10.0 9.4 10.4 9.7   BILIRUBIN mg/dL  --   --   --  0.2   ALK PHOS U/L  --   --   --  105   ALT (SGPT) U/L  --   --   --  18   AST (SGOT) U/L  --   --   --  21   ANION GAP mmol/L 14.0 19.0* 22.0* 18.0*     Estimated Creatinine Clearance: 36.4 mL/min (A) (by C-G formula based on SCr of 3.77 mg/dL (H)).  Results from last 7 days   Lab Units 08/06/20  0354 08/05/20  1927 08/05/20  1811   MAGNESIUM mg/dL 1.9 2.6 3.2*   PHOSPHORUS mg/dL  --  5.7* 6.7*         Results from last 7 days   Lab Units 08/06/20  0354 08/05/20  1811   WBC 10*3/mm3 13.72* 16.03*   HEMOGLOBIN g/dL 11.9* 16.1   HEMATOCRIT % 37.4* 50.5   PLATELETS 10*3/mm3 174 243     Results from last 7 days   Lab Units 08/05/20  1811   INR  1.06       Culture Data:   No results found for: BLOODCX  No  results found for: URINECX  No results found for: RESPCX  No results found for: WOUNDCX  No results found for: STOOLCX  No components found for: BODYFLD    Radiology Data:   Imaging Results (Last 24 Hours)     Procedure Component Value Units Date/Time    XR Chest 1 View [[id number]] Collected:  08/05/20 1813     Updated:  08/05/20 1831    Narrative:       [facility]    Patient Name: PEBBLES WONG    ORDERING: MONO CR     ATTENDING: MONO CR     REFERRING: MONO CR    -----------------------    PROCEDURE: Chest Single View    TECHNIQUE: Single AP view of the chest    COMPARISON: None    HISTORY: Severe Sepsis triage protocol, A41.9 Sepsis, unspecified  organism R65.20 Severe sepsis without septic shock E11.10 Type 2  diabetes mellitus with ketoacidosis without coma    FINDINGS:     Life-support devices: There is a dual lead left subclavian  pacemaking device present.    Lungs/pleura: Hazy patchy infiltrate suspected within the right  mid to lower lung zones centrally. No dense consolidation,  effusion, or pneumothorax.    Heart, hilar and mediastinal structures: The heart size and  mediastinal contours are within limits of normal. The trachea is  midline.    Skeletal Structures: No acute findings. No free air beneath the  diaphragm.      Impression:       Hazy infiltrative change right mid to lower lung zone.    Electronically signed by:  Anthony Boyce MD  8/5/2020 6:30 PM CDT  Workstation: [id number]          I have reviewed the patient's current medications.     Assessment/Plan   Combination of DKA and HHS     In the setting of diabetes mellitus type 2     Anion gap has closed around noon; no lab work done around 4 pm     He is on 3 units of regular insulin drip     Will discontinue every solution with potassium in it due to his renal failure    Acute kidney injury on chronic kidney disease     Patient stated that he was discharge from [facility] in the first  days of July/20 and he was told to follow up with a nephrologist      Pending new labs to reevaluate; continue with current IVF     Nephrology consulted for am    Troponin elevated     Will do follow up troponin and consult cardiology am     No chest pain reported    Sepsis?     Being treated for PNA     Doubtful; will order procalcitonin today and tomorrow and check lactic acid later on     Patient denies respiratory symptoms before; empirically continue with current antibiotics and reevaluate am     Check x ray 2 views am    Discharge Planning: I expect patient to be discharged to home in 2 to 3 days    Trent Celeste MD      Electronically signed by Trent Celeste MD at 08/06/20 1813       Medical Student Notes (last 48 hours) (Notes from 08/05/20 1119 through 08/07/20 1119)    No notes of this type exist for this encounter.       Consult Notes (last 48 hours) (Notes from 08/05/20 1119 through 08/07/20 1119)    No notes of this type exist for this encounter.

## 2020-08-07 NOTE — PLAN OF CARE
Problem: Patient Care Overview  Goal: Plan of Care Review  Outcome: Ongoing (interventions implemented as appropriate)  Flowsheets  Taken 8/7/2020 0401  Progress: improving  Patient Agreement with Plan of Care: agrees  Outcome Summary: Pt VSS throughout shift, pt NSR to sinus tach on tele monitor with occasional PVC's, pt has no c/o chest pain, pt reported nausea during shift, prn Zofran given with relief, pt labs improved, insulin gtt and fluids are infusing, will continue to monitor pt.  Taken 8/6/2020 2000  Plan of Care Reviewed With: patient

## 2020-08-07 NOTE — CONSULTS
[facility]  [address] [place] [address]. [address]   - [phone]  F  [phone]     Consultation         PATIENT  DEMOGRAPHICS   PATIENT NAME: Kenny Lopez                      PHYSICIAN: Lico Salvador MD  : 1975  MRN: [id number]    Subjective   SUBJECTIVE   Referring Provider: Dr Celeste  Reason for Consultation: [place] kd 3  History of present illness:      Mr. Lopez is a 45-year-old gentleman with a past medical history of diabetes mellitus type 2.  He has a recent elevated blood sugars and had admission at [facility].  Over there he has acute kidney injury and is supposed to see a nephrologist in the outpatient setting.  He came in with persistent elevation in blood sugar along with nausea.  He denies any marked vomiting.  He also has polyuria with increased fatigue.    On arrival here his blood sugar was 1116.  Potassium was 6.4.  His bicarb was 20.  His creatinine was 2.83. We dont Have any prior creatinine results.  His creatinine is some better 2.53 now.  He denies any problem with urination.  He denies any nausea and vomiting now.  He is on low rate of insulin drip.    History reviewed. No pertinent past medical history.  History reviewed. No pertinent surgical history.  History reviewed. No pertinent family history.  Social History     Tobacco Use   • Smoking status: Never Smoker   • Smokeless tobacco: Never Used   Substance Use Topics   • Alcohol use: Never     Frequency: Never   • Drug use: Never     Allergies:  Penicillins     REVIEW OF SYSTEMS    Review of Systems   Constitutional: Negative for chills and fever.   Respiratory: Negative for chest tightness and shortness of breath.    Cardiovascular: Negative for chest pain and leg swelling.   Gastrointestinal: Negative for abdominal pain, diarrhea and nausea.   Genitourinary: Negative for dysuria, flank pain and hematuria.   Neurological: Negative for dizziness, syncope and weakness.   "      Objective   OBJECTIVE   Vital Signs  Temp:  [97.8 °F (36.6 °C)-98.9 °F (37.2 °C)] 98.6 °F (37 °C)  Heart Rate:  [] 118  Resp:  [16-22] 18  BP: ()/(55-94) 118/79    Flowsheet Rows      First Filed Value   Admission Height  188 cm (74\") Documented at 08/05/2020 1838   Admission Weight  (!) 136 kg (300 lb 3.2 oz) Documented at 08/05/2020 1838           I/O last 3 completed shifts:  In: 76713.7 [P.O.:680; I.V.:9514.7; IV Piggyback:200]  Out: 1150 [Urine:1150]    PHYSICAL EXAM    Physical Exam   Constitutional: He is oriented to person, place, and time. He appears well-developed.   HENT:   Head: Normocephalic.   Eyes: Pupils are equal, round, and reactive to light.   Cardiovascular: Normal rate, regular rhythm and normal heart sounds.   Pulmonary/Chest: Effort normal and breath sounds normal.   Abdominal: Soft. Bowel sounds are normal.   Neurological: He is alert and oriented to person, place, and time.       RESULTS   Results Review:    Results from last 7 days   Lab Units 08/07/20  0815 08/07/20  0416 08/07/20  0027  08/05/20  1927   SODIUM mmol/L 152* 153* 151*   < > 140   POTASSIUM mmol/L 4.3 4.2 4.1   < > 6.4*   CHLORIDE mmol/L 119* 119* 118*   < > 102   CO2 mmol/L 23.0 25.0 23.0   < > 20.0*   BUN mg/dL 59* 64* 67*   < > 71*   CREATININE mg/dL 2.53* 2.38* 2.76*   < > 2.83*   CALCIUM mg/dL 9.4 9.5 9.2   < > 9.7   BILIRUBIN mg/dL  --   --   --   --  0.2   ALK PHOS U/L  --   --   --   --  105   ALT (SGPT) U/L  --   --   --   --  18   AST (SGOT) U/L  --   --   --   --  21   GLUCOSE mg/dL 115* 203* 272*   < > 1,116*    < > = values in this interval not displayed.       Estimated Creatinine Clearance: 58.9 mL/min (A) (by C-G formula based on SCr of 2.53 mg/dL (H)).    Results from last 7 days   Lab Units 08/07/20  0416 08/06/20  0354 08/05/20  1927 08/05/20  1811   MAGNESIUM mg/dL 2.1 1.9 2.6 3.2*   PHOSPHORUS mg/dL  --   --  5.7* 6.7*             Results from last 7 days   Lab Units 08/07/20  0416 " 08/06/20  0354 08/05/20  1811   WBC 10*3/mm3 10.30 13.72* 16.03*   HEMOGLOBIN g/dL 13.2 11.9* 16.1   PLATELETS 10*3/mm3 192 174 243       Results from last 7 days   Lab Units 08/05/20  1811   INR  1.06        MEDICATIONS      aspirin 81 mg Oral Daily   heparin (porcine) 5,000 Units Subcutaneous Q8H   insulin aspart 0-9 Units Subcutaneous TID AC   insulin detemir 15 Units Subcutaneous Q12H   pantoprazole 40 mg Intravenous Q AM   sodium chloride 10 mL Intravenous Q12H   sodium chloride 3 mL Intravenous Q12H       dextrose 5 % and sodium chloride 0.45 % 150 mL/hr Last Rate: 100 mL/hr (08/07/20 1000)   insulin 12 Units/hr Last Rate: 8 Units/hr (08/07/20 1000)   sodium chloride 75 mL/hr    sodium chloride 10 mL/hr      Medications Prior to Admission   Medication Sig Dispense Refill Last Dose   • amLODIPine (NORVASC) 10 MG tablet amlodipine 10 mg tablet      • aspirin 81 MG chewable tablet aspirin 81 mg chewable tablet      • carvedilol (COREG) 25 MG tablet carvedilol 25 mg tablet      • Ergocalciferol (VITAMIN D2 PO) Vitamin D2 1,250 mcg (50,000 unit) capsule      • hydroCHLOROthiazide (HYDRODIURIL) 25 MG tablet hydrochlorothiazide 25 mg tablet      • Insulin Glargine (BASAGLAR KWIKPEN SC) Basaglar KwikPen U-100 Insulin 100 unit/mL (3 mL) subcutaneous      • isosorbide mononitrate (IMDUR) 30 MG 24 hr tablet isosorbide mononitrate ER 30 mg tablet,extended release 24 hr   Take 1 tablet every day by oral route.      • metFORMIN (GLUCOPHAGE) 1000 MG tablet metformin 1,000 mg tablet      • methocarbamol (ROBAXIN) 500 MG tablet methocarbamol 500 mg tablet   Take 1 tablet every day by oral route.      • raNITIdine (ZANTAC) 150 MG tablet ranitidine 150 mg tablet        Assessment/Plan   ASSESSMENT / PLAN      Diabetic ketoacidosis without coma (CMS/HCC)    Severe sepsis (CMS/HCC)    Essential hypertension    Elevated troponin    Community acquired pneumonia of right middle lobe of lung    1.acute kidney injury with background of  CKD 3.  We do not have any baseline creatinine.  It is up to 2.5 today.  His bicarb is corrected.  He is making good urine.    I will keep D5 half saline with 20 of KCL at 100 cc/h.  We will add half saline to correct his high sodium at 75 cc/h.  Once D5 half saline is stopped will increase the half saline to 150 cc/h.  This is discussed with the nursing staff.  I will check urine protein to creatinine ratio.  I will check ultrasound of the kidneys.  Patient metformin hydrochlorothiazide Coreg and amlodipine are on hold.  He also has low blood pressure on arrival.    Acute kidney injury is related to prerenal azotemia versus ATN    2.DKA versus HHS.  Patient is currently on insulin drip with D5 half saline.  His bicarb is corrected.    3.history of hypertension currently running low    4.history of nonischemic cardiomyopathy status post AICD.    Thank you for the referral we will continue to follow the patient during his hospital stay         I discussed the patients findings and my recommendations with patient, nursing staff and primary care team         This document has been electronically signed by Lico Salvador MD on August 7, 2020 11:14

## 2020-08-07 NOTE — PROGRESS NOTES
[facility] Medicine Services  INPATIENT PROGRESS NOTE    Length of Stay: 2  Date of Admission: 8/5/2020  Primary Care Physician: Maninder Dixon MD    Subjective   (S) negative for fever and nausea, vomiting; found him sitting on the edge of the bed    Review of Systems   Constitutional: Negative for fever.   HENT: Negative for congestion and trouble swallowing.    Eyes: Negative for redness.   Respiratory: Negative for chest tightness and shortness of breath.    Cardiovascular: Negative for chest pain.   Genitourinary: Negative for difficulty urinating.   Musculoskeletal: Negative for arthralgias.   Neurological: Negative for dizziness and headaches.   Psychiatric/Behavioral: Negative for agitation and behavioral problems.     All pertinent negatives and positives are as above. All other systems have been reviewed and are negative unless otherwise stated.     Prior to Admission medications    Medication Sig Start Date End Date Taking? Authorizing Provider   amLODIPine (NORVASC) 10 MG tablet amlodipine 10 mg tablet    Beth Cho MD   aspirin 81 MG chewable tablet aspirin 81 mg chewable tablet    Beth Cho MD   carvedilol (COREG) 25 MG tablet carvedilol 25 mg tablet    Beth Cho MD   Ergocalciferol (VITAMIN D2 PO) Vitamin D2 1,250 mcg (50,000 unit) capsule    Beth Cho MD   hydroCHLOROthiazide (HYDRODIURIL) 25 MG tablet hydrochlorothiazide 25 mg tablet    Beth Cho MD   Insulin Glargine (BASAGLAR KWIKPEN SC) Basaglseveriano McgillikPen U-100 Insulin 100 unit/mL (3 mL) subcutaneous    Beth Cho MD   isosorbide mononitrate (IMDUR) 30 MG 24 hr tablet isosorbide mononitrate ER 30 mg tablet,extended release 24 hr   Take 1 tablet every day by oral route.    Beth Cho MD   metFORMIN (GLUCOPHAGE) 1000 MG tablet metformin 1,000 mg tablet    Beth Cho MD   methocarbamol (ROBAXIN) 500 MG tablet  methocarbamol 500 mg tablet   Take 1 tablet every day by oral route.    Provider, MD Beth   raNITIdine (ZANTAC) 150 MG tablet ranitidine 150 mg tablet    Provider, MD Beth         aspirin 81 mg Oral Daily   heparin (porcine) 5,000 Units Subcutaneous Q8H   insulin aspart 0-9 Units Subcutaneous TID AC   insulin detemir 10 Units Subcutaneous Q12H   pantoprazole 40 mg Intravenous Q AM   sodium chloride 10 mL Intravenous Q12H   sodium chloride 3 mL Intravenous Q12H       dextrose 5 % and sodium chloride 0.45 % 150 mL/hr Last Rate: 150 mL/hr (08/07/20 0531)   insulin 12 Units/hr Last Rate: 12 Units/hr (08/07/20 0709)   lactated ringers 250 mL/hr Last Rate: Stopped (08/06/20 0800)   sodium chloride 10 mL/hr        Objective    Temp:  [97.8 °F (36.6 °C)-98.9 °F (37.2 °C)] 98.6 °F (37 °C)  Heart Rate:  [] 118  Resp:  [16-22] 18  BP: ()/(52-94) 118/79    Physical Exam  Obese patient, no in acute distress  Neck is supple; EOMI; oral mucous moist  Normal expansion of bilateral lungs; CTA x 2  Normal sinus rhythm; no murmurs  Abdomen globose, no tender to palpation; normal bowel sounds; hydrocele present; no inflammatory signs  Able to move upper and lower extremities  Alert and oriented x 4; no neuro focalization  He is calm, well behaved      Results Review:  I have reviewed the labs, radiology results, and diagnostic studies.    Laboratory Data:   Results from last 7 days   Lab Units 08/07/20  0416 08/07/20  0027 08/06/20 2008 08/05/20  1927   SODIUM mmol/L 153* 151* 152*   < > 140   POTASSIUM mmol/L 4.2 4.1 4.6   < > 6.4*   CHLORIDE mmol/L 119* 118* 118*   < > 102   CO2 mmol/L 25.0 23.0 23.0   < > 20.0*   BUN mg/dL 64* 67* 69*   < > 71*   CREATININE mg/dL 2.38* 2.76* 3.11*   < > 2.83*   GLUCOSE mg/dL 203* 272* 281*   < > 1,116*   CALCIUM mg/dL 9.5 9.2 9.6   < > 9.7   BILIRUBIN mg/dL  --   --   --   --  0.2   ALK PHOS U/L  --   --   --   --  105   ALT (SGPT) U/L  --   --   --   --  18   AST (SGOT)  U/L  --   --   --   --  21   ANION GAP mmol/L 9.0 10.0 11.0   < > 18.0*    < > = values in this interval not displayed.     Estimated Creatinine Clearance: 62.6 mL/min (A) (by C-G formula based on SCr of 2.38 mg/dL (H)).  Results from last 7 days   Lab Units 08/07/20  0416 08/06/20  0354 08/05/20  1927 08/05/20  1811   MAGNESIUM mg/dL 2.1 1.9 2.6 3.2*   PHOSPHORUS mg/dL  --   --  5.7* 6.7*         Results from last 7 days   Lab Units 08/07/20  0416 08/06/20  0354 08/05/20  1811   WBC 10*3/mm3 10.30 13.72* 16.03*   HEMOGLOBIN g/dL 13.2 11.9* 16.1   HEMATOCRIT % 41.2 37.4* 50.5   PLATELETS 10*3/mm3 192 174 243     Results from last 7 days   Lab Units 08/05/20  1811   INR  1.06       Culture Data:   Blood Culture   Date Value Ref Range Status   08/05/2020 No growth at 24 hours  Preliminary   08/05/2020 No growth at 24 hours  Preliminary     No results found for: URINECX  No results found for: RESPCX  No results found for: WOUNDCX  No results found for: STOOLCX  No components found for: BODYFLD    Radiology Data:   Imaging Results (Last 24 Hours)     Procedure Component Value Units Date/Time    XR Chest PA & Lateral [[id number]] Collected:  08/07/20 0735     Updated:  08/07/20 0813    Narrative:       EXAM: XR CHEST 2 VIEWS    COMPARISONS: Radiograph 8/5/2020    FINDINGS:   Lungs are adequately expanded. No consolidation, effusion,  pneumothorax. The right lower lobe appears well aerated on  today's exam. Cardiomegaly is unchanged. No acute osseous  abnormality. Dual-lead cardiac pacer is again seen.      Impression:       No acute cardiopulmonary process.    Electronically signed by:  Lauren Hi MD  8/7/2020  8:12 AM CDT Workstation: [id number]          I have reviewed the patient's current medications.     Assessment/Plan   Combination of DKA and HHS     In the setting of diabetes mellitus type 2     Anion gap has closed now, will transition in the next 4 hours, since he still using high insulin rate;  discussed with RN when glucose levels is high, to decrease the rate of the D5W1/2SS rate instead of increasing the insulin rate;      Plan to wean insulin and IVFs in the next 4 hours; will start him on long acting insulin and SSI     Check A1C am    Acute kidney injury on chronic kidney disease     Patient stated that he was discharge from [facility] in the first days of July/20 and he was told to follow up with a nephrologist but he didn't      Cr is trending down     Discussed with nephrologist    Troponin elevated     I don't see the need at this time to consult cardiology since patient has been asymptomatic and likely his troponin levels were related to his acidotic state and his acute kidney injury; discussed this with cardiology, Dr Rowland, who concurs with this above statement and no further work up would need to be done and that I needed to talk with nephrologist    Sepsis?     Being treated for PNA; ruled out; sepsis ruled out     Doubtful; x ray 2 views with no acute disease; procalcitonin trending down; discontinue antibiotics    Discharge Planning: I expect patient to be discharged to home in 1 to 2 days    Trent Celeste MD

## 2020-08-07 NOTE — NURSING NOTE
MD De Lnua notified of BMP results showing DKA resolved. MD plan is to maintain insulin gtt during the night and no transition to subQ insulin.

## 2020-08-08 ENCOUNTER — APPOINTMENT (OUTPATIENT)
Dept: CARDIOLOGY | Facility: HOSPITAL | Age: 45
End: 2020-08-08

## 2020-08-08 LAB
ANION GAP SERPL CALCULATED.3IONS-SCNC: 13 MMOL/L (ref 5–15)
BASOPHILS # BLD AUTO: 0.04 10*3/MM3 (ref 0–0.2)
BASOPHILS NFR BLD AUTO: 0.5 % (ref 0–1.5)
BH CV ECHO MEAS - ACS: 2.3 CM
BH CV ECHO MEAS - AO MAX PG (FULL): -0.81 MMHG
BH CV ECHO MEAS - AO MAX PG: 8.1 MMHG
BH CV ECHO MEAS - AO MEAN PG (FULL): 1 MMHG
BH CV ECHO MEAS - AO MEAN PG: 6 MMHG
BH CV ECHO MEAS - AO ROOT AREA (BSA CORRECTED): 1.4
BH CV ECHO MEAS - AO ROOT AREA: 11.3 CM^2
BH CV ECHO MEAS - AO ROOT DIAM: 3.8 CM
BH CV ECHO MEAS - AO V2 MAX: 142 CM/SEC
BH CV ECHO MEAS - AO V2 MEAN: 115 CM/SEC
BH CV ECHO MEAS - AO V2 VTI: 24.9 CM
BH CV ECHO MEAS - ASC AORTA: 3.4 CM
BH CV ECHO MEAS - AVA(I,A): 3.6 CM^2
BH CV ECHO MEAS - AVA(I,D): 3.6 CM^2
BH CV ECHO MEAS - AVA(V,A): 4 CM^2
BH CV ECHO MEAS - AVA(V,D): 4 CM^2
BH CV ECHO MEAS - BSA(HAYCOCK): 3 M^2
BH CV ECHO MEAS - BSA: 2.8 M^2
BH CV ECHO MEAS - BZI_BMI: 45.5 KILOGRAMS/M^2
BH CV ECHO MEAS - BZI_METRIC_HEIGHT: 188 CM
BH CV ECHO MEAS - BZI_METRIC_WEIGHT: 160.6 KG
BH CV ECHO MEAS - EDV(CUBED): 188.1 ML
BH CV ECHO MEAS - EDV(MOD-SP2): 122 ML
BH CV ECHO MEAS - EDV(MOD-SP4): 173 ML
BH CV ECHO MEAS - EDV(TEICH): 162 ML
BH CV ECHO MEAS - EF(CUBED): 47.6 %
BH CV ECHO MEAS - EF(MOD-SP2): 29.5 %
BH CV ECHO MEAS - EF(MOD-SP4): 31.8 %
BH CV ECHO MEAS - EF(TEICH): 39.3 %
BH CV ECHO MEAS - ESV(CUBED): 98.6 ML
BH CV ECHO MEAS - ESV(MOD-SP2): 86 ML
BH CV ECHO MEAS - ESV(MOD-SP4): 118 ML
BH CV ECHO MEAS - ESV(TEICH): 98.3 ML
BH CV ECHO MEAS - FS: 19.4 %
BH CV ECHO MEAS - IVS/LVPW: 0.91
BH CV ECHO MEAS - IVSD: 1.5 CM
BH CV ECHO MEAS - LA DIMENSION: 4.4 CM
BH CV ECHO MEAS - LA/AO: 1.2
BH CV ECHO MEAS - LV DIASTOLIC VOL/BSA (35-75): 62.5 ML/M^2
BH CV ECHO MEAS - LV MASS(C)D: 405.3 GRAMS
BH CV ECHO MEAS - LV MASS(C)DI: 146.3 GRAMS/M^2
BH CV ECHO MEAS - LV MAX PG: 8.9 MMHG
BH CV ECHO MEAS - LV MEAN PG: 5 MMHG
BH CV ECHO MEAS - LV SYSTOLIC VOL/BSA (12-30): 42.6 ML/M^2
BH CV ECHO MEAS - LV V1 MAX: 149 CM/SEC
BH CV ECHO MEAS - LV V1 MEAN: 98.8 CM/SEC
BH CV ECHO MEAS - LV V1 VTI: 23.8 CM
BH CV ECHO MEAS - LVIDD: 5.7 CM
BH CV ECHO MEAS - LVIDS: 4.6 CM
BH CV ECHO MEAS - LVLD AP2: 8.4 CM
BH CV ECHO MEAS - LVLD AP4: 9.1 CM
BH CV ECHO MEAS - LVLS AP2: 7.3 CM
BH CV ECHO MEAS - LVLS AP4: 7.6 CM
BH CV ECHO MEAS - LVOT AREA (M): 3.8 CM^2
BH CV ECHO MEAS - LVOT AREA: 3.8 CM^2
BH CV ECHO MEAS - LVOT DIAM: 2.2 CM
BH CV ECHO MEAS - LVPWD: 1.6 CM
BH CV ECHO MEAS - MV A MAX VEL: 81.7 CM/SEC
BH CV ECHO MEAS - MV DEC SLOPE: 605 CM/SEC^2
BH CV ECHO MEAS - MV E MAX VEL: 58.3 CM/SEC
BH CV ECHO MEAS - MV E/A: 0.71
BH CV ECHO MEAS - MV P1/2T MAX VEL: 77.2 CM/SEC
BH CV ECHO MEAS - MV P1/2T: 37.4 MSEC
BH CV ECHO MEAS - MVA P1/2T LCG: 2.8 CM^2
BH CV ECHO MEAS - MVA(P1/2T): 5.9 CM^2
BH CV ECHO MEAS - PA MAX PG (FULL): 3.8 MMHG
BH CV ECHO MEAS - PA MAX PG: 6.6 MMHG
BH CV ECHO MEAS - PA V2 MAX: 128 CM/SEC
BH CV ECHO MEAS - RV MAX PG: 2.7 MMHG
BH CV ECHO MEAS - RV MEAN PG: 1 MMHG
BH CV ECHO MEAS - RV V1 MAX: 82.9 CM/SEC
BH CV ECHO MEAS - RV V1 MEAN: 55.7 CM/SEC
BH CV ECHO MEAS - RV V1 VTI: 12.9 CM
BH CV ECHO MEAS - SI(AO): 101.9 ML/M^2
BH CV ECHO MEAS - SI(CUBED): 32.3 ML/M^2
BH CV ECHO MEAS - SI(LVOT): 32.7 ML/M^2
BH CV ECHO MEAS - SI(MOD-SP2): 13 ML/M^2
BH CV ECHO MEAS - SI(MOD-SP4): 19.9 ML/M^2
BH CV ECHO MEAS - SI(TEICH): 23 ML/M^2
BH CV ECHO MEAS - SV(AO): 282.4 ML
BH CV ECHO MEAS - SV(CUBED): 89.5 ML
BH CV ECHO MEAS - SV(LVOT): 90.5 ML
BH CV ECHO MEAS - SV(MOD-SP2): 36 ML
BH CV ECHO MEAS - SV(MOD-SP4): 55 ML
BH CV ECHO MEAS - SV(TEICH): 63.7 ML
BH CV ECHO MEAS - TR MAX VEL: 247 CM/SEC
BUN SERPL-MCNC: 39 MG/DL (ref 6–20)
BUN/CREAT SERPL: 21.8 (ref 7–25)
CALCIUM SPEC-SCNC: 9 MG/DL (ref 8.6–10.5)
CHLORIDE SERPL-SCNC: 111 MMOL/L (ref 98–107)
CO2 SERPL-SCNC: 24 MMOL/L (ref 22–29)
CREAT SERPL-MCNC: 1.79 MG/DL (ref 0.76–1.27)
CREAT UR-MCNC: 147.3 MG/DL
CREAT UR-MCNC: 147.3 MG/DL
D-LACTATE SERPL-SCNC: 3.6 MMOL/L (ref 0.5–2)
DEPRECATED RDW RBC AUTO: 46.1 FL (ref 37–54)
EOSINOPHIL # BLD AUTO: 0.1 10*3/MM3 (ref 0–0.4)
EOSINOPHIL NFR BLD AUTO: 1.3 % (ref 0.3–6.2)
ERYTHROCYTE [DISTWIDTH] IN BLOOD BY AUTOMATED COUNT: 14.1 % (ref 12.3–15.4)
GFR SERPL CREATININE-BSD FRML MDRD: 50 ML/MIN/1.73
GLUCOSE BLDC GLUCOMTR-MCNC: 314 MG/DL (ref 70–130)
GLUCOSE BLDC GLUCOMTR-MCNC: 381 MG/DL (ref 70–130)
GLUCOSE BLDC GLUCOMTR-MCNC: 497 MG/DL (ref 70–130)
GLUCOSE SERPL-MCNC: 346 MG/DL (ref 65–99)
GLUCOSE SERPL-MCNC: 443 MG/DL (ref 65–99)
HBA1C MFR BLD: 16.2 % (ref 4.8–5.6)
HCT VFR BLD AUTO: 40.6 % (ref 37.5–51)
HGB BLD-MCNC: 12.8 G/DL (ref 13–17.7)
IMM GRANULOCYTES # BLD AUTO: 0.05 10*3/MM3 (ref 0–0.05)
IMM GRANULOCYTES NFR BLD AUTO: 0.6 % (ref 0–0.5)
LV EF 2D ECHO EST: 35 %
LYMPHOCYTES # BLD AUTO: 2.19 10*3/MM3 (ref 0.7–3.1)
LYMPHOCYTES NFR BLD AUTO: 28.4 % (ref 19.6–45.3)
MAGNESIUM SERPL-MCNC: 2 MG/DL (ref 1.6–2.6)
MAXIMAL PREDICTED HEART RATE: 175 BPM
MCH RBC QN AUTO: 28.1 PG (ref 26.6–33)
MCHC RBC AUTO-ENTMCNC: 31.5 G/DL (ref 31.5–35.7)
MCV RBC AUTO: 89.2 FL (ref 79–97)
MONOCYTES # BLD AUTO: 0.96 10*3/MM3 (ref 0.1–0.9)
MONOCYTES NFR BLD AUTO: 12.5 % (ref 5–12)
NEUTROPHILS NFR BLD AUTO: 4.37 10*3/MM3 (ref 1.7–7)
NEUTROPHILS NFR BLD AUTO: 56.7 % (ref 42.7–76)
NRBC BLD AUTO-RTO: 0 /100 WBC (ref 0–0.2)
PLATELET # BLD AUTO: 167 10*3/MM3 (ref 140–450)
PMV BLD AUTO: 12 FL (ref 6–12)
POTASSIUM SERPL-SCNC: 5.1 MMOL/L (ref 3.5–5.2)
POTASSIUM SERPL-SCNC: 5.4 MMOL/L (ref 3.5–5.2)
PROT UR-MCNC: 24 MG/DL
PROT/CREAT UR: 162.9 MG/G CREA (ref 0–200)
RBC # BLD AUTO: 4.55 10*6/MM3 (ref 4.14–5.8)
SODIUM SERPL-SCNC: 148 MMOL/L (ref 136–145)
STRESS TARGET HR: 149 BPM
WBC # BLD AUTO: 7.71 10*3/MM3 (ref 3.4–10.8)

## 2020-08-08 PROCEDURE — 25010000002 PERFLUTREN (DEFINITY) 8.476 MG IN SODIUM CHLORIDE 0.9 % 10 ML INJECTION: Performed by: INTERNAL MEDICINE

## 2020-08-08 PROCEDURE — 93306 TTE W/DOPPLER COMPLETE: CPT | Performed by: INTERNAL MEDICINE

## 2020-08-08 PROCEDURE — 25010000002 ONDANSETRON PER 1 MG: Performed by: INTERNAL MEDICINE

## 2020-08-08 PROCEDURE — 84132 ASSAY OF SERUM POTASSIUM: CPT | Performed by: STUDENT IN AN ORGANIZED HEALTH CARE EDUCATION/TRAINING PROGRAM

## 2020-08-08 PROCEDURE — 63710000001 INSULIN ASPART PER 5 UNITS: Performed by: INTERNAL MEDICINE

## 2020-08-08 PROCEDURE — 36415 COLL VENOUS BLD VENIPUNCTURE: CPT | Performed by: INTERNAL MEDICINE

## 2020-08-08 PROCEDURE — 82962 GLUCOSE BLOOD TEST: CPT

## 2020-08-08 PROCEDURE — 25010000002 HEPARIN (PORCINE) PER 1000 UNITS: Performed by: STUDENT IN AN ORGANIZED HEALTH CARE EDUCATION/TRAINING PROGRAM

## 2020-08-08 PROCEDURE — 99254 IP/OBS CNSLTJ NEW/EST MOD 60: CPT | Performed by: INTERNAL MEDICINE

## 2020-08-08 PROCEDURE — 63710000001 INSULIN ASPART PER 5 UNITS: Performed by: STUDENT IN AN ORGANIZED HEALTH CARE EDUCATION/TRAINING PROGRAM

## 2020-08-08 PROCEDURE — 83735 ASSAY OF MAGNESIUM: CPT | Performed by: INTERNAL MEDICINE

## 2020-08-08 PROCEDURE — 63710000001 INSULIN DETEMIR PER 5 UNITS: Performed by: STUDENT IN AN ORGANIZED HEALTH CARE EDUCATION/TRAINING PROGRAM

## 2020-08-08 PROCEDURE — 83036 HEMOGLOBIN GLYCOSYLATED A1C: CPT | Performed by: STUDENT IN AN ORGANIZED HEALTH CARE EDUCATION/TRAINING PROGRAM

## 2020-08-08 PROCEDURE — 80048 BASIC METABOLIC PNL TOTAL CA: CPT | Performed by: INTERNAL MEDICINE

## 2020-08-08 PROCEDURE — 25010000002 HEPARIN (PORCINE) PER 1000 UNITS: Performed by: INTERNAL MEDICINE

## 2020-08-08 PROCEDURE — 83605 ASSAY OF LACTIC ACID: CPT | Performed by: STUDENT IN AN ORGANIZED HEALTH CARE EDUCATION/TRAINING PROGRAM

## 2020-08-08 PROCEDURE — 82947 ASSAY GLUCOSE BLOOD QUANT: CPT | Performed by: INTERNAL MEDICINE

## 2020-08-08 PROCEDURE — 93306 TTE W/DOPPLER COMPLETE: CPT

## 2020-08-08 PROCEDURE — 63710000001 INSULIN DETEMIR PER 5 UNITS: Performed by: INTERNAL MEDICINE

## 2020-08-08 PROCEDURE — 85025 COMPLETE CBC W/AUTO DIFF WBC: CPT | Performed by: INTERNAL MEDICINE

## 2020-08-08 RX ADMIN — HEPARIN SODIUM 5000 UNITS: 5000 INJECTION INTRAVENOUS; SUBCUTANEOUS at 13:57

## 2020-08-08 RX ADMIN — HEPARIN SODIUM 5000 UNITS: 5000 INJECTION INTRAVENOUS; SUBCUTANEOUS at 05:44

## 2020-08-08 RX ADMIN — INSULIN ASPART 3 UNITS: 100 INJECTION, SOLUTION INTRAVENOUS; SUBCUTANEOUS at 12:30

## 2020-08-08 RX ADMIN — ASPIRIN 81 MG 81 MG: 81 TABLET ORAL at 08:46

## 2020-08-08 RX ADMIN — HEPARIN SODIUM 5000 UNITS: 5000 INJECTION INTRAVENOUS; SUBCUTANEOUS at 21:18

## 2020-08-08 RX ADMIN — ONDANSETRON 4 MG: 2 INJECTION INTRAMUSCULAR; INTRAVENOUS at 02:10

## 2020-08-08 RX ADMIN — INSULIN ASPART 8 UNITS: 100 INJECTION, SOLUTION INTRAVENOUS; SUBCUTANEOUS at 12:30

## 2020-08-08 RX ADMIN — INSULIN DETEMIR 20 UNITS: 100 INJECTION, SOLUTION SUBCUTANEOUS at 21:19

## 2020-08-08 RX ADMIN — SODIUM CHLORIDE 100 ML/HR: 4.5 INJECTION, SOLUTION INTRAVENOUS at 21:19

## 2020-08-08 RX ADMIN — PANTOPRAZOLE SODIUM 40 MG: 40 INJECTION, POWDER, FOR SOLUTION INTRAVENOUS at 05:44

## 2020-08-08 RX ADMIN — INSULIN ASPART 7 UNITS: 100 INJECTION, SOLUTION INTRAVENOUS; SUBCUTANEOUS at 08:46

## 2020-08-08 RX ADMIN — INSULIN ASPART 7 UNITS: 100 INJECTION, SOLUTION INTRAVENOUS; SUBCUTANEOUS at 17:56

## 2020-08-08 RX ADMIN — INSULIN DETEMIR 15 UNITS: 100 INJECTION, SOLUTION SUBCUTANEOUS at 08:50

## 2020-08-08 RX ADMIN — INSULIN ASPART 5 UNITS: 100 INJECTION, SOLUTION INTRAVENOUS; SUBCUTANEOUS at 17:57

## 2020-08-08 RX ADMIN — PERFLUTREN 1.5 ML: 6.52 INJECTION, SUSPENSION INTRAVENOUS at 16:15

## 2020-08-08 NOTE — PROGRESS NOTES
"[facility]  [address] [place] [address]. [address]   - [phone]  F - [phone]     Progress Note          PATIENT  DEMOGRAPHICS   PATIENT NAME: Kenny Lopez                      PHYSICIAN: PONCE Acharya  : 1975  MRN: [id number]   LOS: 3 days    Patient Care Team:  Maninder Dixon MD as PCP - General (Pulmonary Disease)  Subjective   SUBJECTIVE   Feeling better.         Objective   OBJECTIVE   Vital Signs  Temp:  [98.2 °F (36.8 °C)-98.5 °F (36.9 °C)] 98.2 °F (36.8 °C)  Heart Rate:  [] 105  Resp:  [18-20] 18  BP: (115-171)/() 130/64    Flowsheet Rows      First Filed Value   Admission Height  188 cm (74\") Documented at 2020   Admission Weight  (!) 136 kg (300 lb 3.2 oz) Documented at 2020           I/O last 3 completed shifts:  In: 6702.2 [P.O.:1760; I.V.:4842.2; IV Piggyback:100]  Out: 2450 [Urine:2450]    PHYSICAL EXAM    Physical Exam   Constitutional: He is oriented to person, place, and time. He appears well-developed and well-nourished.   HENT:   Head: Normocephalic and atraumatic.   Eyes: Pupils are equal, round, and reactive to light. Conjunctivae and EOM are normal.   Cardiovascular: Normal rate and regular rhythm.   Pulmonary/Chest: Effort normal and breath sounds normal.   Abdominal: Soft.   Neurological: He is alert and oriented to person, place, and time.   Skin: Skin is warm and dry.       RESULTS   Results Review:    Results from last 7 days   Lab Units 20  0427 20  0815 20  0416  20  1927   SODIUM mmol/L 148* 152* 153*   < > 140   POTASSIUM mmol/L 5.4* 4.3 4.2   < > 6.4*   CHLORIDE mmol/L 111* 119* 119*   < > 102   CO2 mmol/L 24.0 23.0 25.0   < > 20.0*   BUN mg/dL 39* 59* 64*   < > 71*   CREATININE mg/dL 1.79* 2.53* 2.38*   < > 2.83*   CALCIUM mg/dL 9.0 9.4 9.5   < > 9.7   BILIRUBIN mg/dL  --   --   --   --  0.2   ALK PHOS U/L  --   --   --   --  105   ALT (SGPT) U/L  --   --   --   " --  18   AST (SGOT) U/L  --   --   --   --  21   GLUCOSE mg/dL 346* 115* 203*   < > 1,116*    < > = values in this interval not displayed.       Estimated Creatinine Clearance: 84 mL/min (A) (by C-G formula based on SCr of 1.79 mg/dL (H)).    Results from last 7 days   Lab Units 08/08/20  0427 08/07/20  0416 08/06/20  0354 08/05/20  1927 08/05/20  1811   MAGNESIUM mg/dL 2.0 2.1 1.9 2.6 3.2*   PHOSPHORUS mg/dL  --   --   --  5.7* 6.7*             Results from last 7 days   Lab Units 08/08/20  0427 08/07/20  0416 08/06/20  0354 08/05/20  1811   WBC 10*3/mm3 7.71 10.30 13.72* 16.03*   HEMOGLOBIN g/dL 12.8* 13.2 11.9* 16.1   PLATELETS 10*3/mm3 167 192 174 243       Results from last 7 days   Lab Units 08/05/20  1811   INR  1.06         Imaging Results (Last 24 Hours)     Procedure Component Value Units Date/Time    US Renal Bilateral [[id number]] Collected:  08/07/20 1652     Updated:  08/07/20 1737    Narrative:       PROCEDURE: US RETROPERITONEUM    Clinical History: ckd 3 orlando rule out hydro, A41.9 Sepsis,  unspecified organism R65.20 Severe sepsis without septic shock  E11.10 Type 2 diabetes mellitus with ketoacidosis without coma  N17.9 Acute kidney failure, unspecified R79.89 Other specified  abnormal findings of blood chemistry J18.9 Pneumonia, unspecified  organism    Indication: Same as above    Comparison: None.    Technique: Grayscale and color Doppler evaluation of the  bilateral kidneys and the urinary bladder was done.    Findings:     The right kidney measures 12.9 x 6.9 x 7.3  centimeters  and the  left kidney measures 14.2 x 5.9 x 6.9  centimeters.     The urinary bladder volume at the time of imaging was 112 cc.     The bilateral kidneys do not  show any evidence of hydronephrosis  or nephrolithiasis.    Survey evaluation of the urinary bladder does not show any gross  abnormalities.    The study is somewhat limited due to patient's body habitus      Impression:       Impression:    The study is somewhat  limited due to patient's body habitus.  Grossly normal bilateral kidneys and urinary bladder.     Electronically signed by:  Chris Beverly MD  8/7/2020 5:36 PM CDT  Workstation: [id number]           MEDICATIONS      aspirin 81 mg Oral Daily   heparin (porcine) 5,000 Units Subcutaneous Q8H   insulin aspart 0-9 Units Subcutaneous TID AC   insulin detemir 15 Units Subcutaneous Q12H   pantoprazole 40 mg Intravenous Q AM   sodium chloride 10 mL Intravenous Q12H   sodium chloride 3 mL Intravenous Q12H       sodium chloride 75 mL/hr Last Rate: Stopped (08/08/20 0847)   sodium chloride 10 mL/hr        Assessment/Plan   ASSESSMENT / PLAN      Diabetic ketoacidosis without coma (CMS/HCC)    Severe sepsis (CMS/HCC)    Essential hypertension    Elevated troponin    Community acquired pneumonia of right middle lobe of lung    1.  HEATHER on CKD3-  We do not have any baseline creatinine.  It is up to 2.5 today.  His bicarb is corrected.  He is making good urine.     -Cr improving, continue IVF. Acute kidney injury is likely related to prerenal azotemia.     2.  DKA versus HHS-  improved     3.  HTN- stable, antihypertensives on hold     4.  NICM status post AICD.    5.  Hypernatremia- Change IVF to 1/2 NS at 100 ml/hr.    6.  Hyperkalemia- Check K this afternoon.           This document has been electronically signed by PONCE Acharya on August 8, 2020 09:56

## 2020-08-08 NOTE — PROGRESS NOTES
[facility] Medicine Services  INPATIENT PROGRESS NOTE    Length of Stay: 3  Date of Admission: 8/5/2020  Primary Care Physician: Maninder Dixon MD    Subjective   No chest pain or shortness of breath  No new complaints     Review of Systems   Constitutional: Negative for fever.   HENT: Negative for congestion and trouble swallowing.    Eyes: Negative for redness.   Respiratory: Negative for chest tightness and shortness of breath.    Cardiovascular: Negative for chest pain.   Genitourinary: Negative for difficulty urinating.   Musculoskeletal: Negative for arthralgias.   Neurological: Negative for dizziness and headaches.   Psychiatric/Behavioral: Negative for agitation and behavioral problems.     All pertinent negatives and positives are as above. All other systems have been reviewed and are negative unless otherwise stated.     Prior to Admission medications    Medication Sig Start Date End Date Taking? Authorizing Provider   amLODIPine (NORVASC) 10 MG tablet amlodipine 10 mg tablet    Beth Cho MD   aspirin 81 MG chewable tablet aspirin 81 mg chewable tablet    ProviderBeth MD   carvedilol (COREG) 25 MG tablet carvedilol 25 mg tablet    ProviderBeth MD   Ergocalciferol (VITAMIN D2 PO) Vitamin D2 1,250 mcg (50,000 unit) capsule    Beth Cho MD   hydroCHLOROthiazide (HYDRODIURIL) 25 MG tablet hydrochlorothiazide 25 mg tablet    Beth Cho MD   Insulin Glargine (BASAGLAR KWIKPEN SC) Basaglar KwikPen U-100 Insulin 100 unit/mL (3 mL) subcutaneous    ProviderBeth MD   isosorbide mononitrate (IMDUR) 30 MG 24 hr tablet isosorbide mononitrate ER 30 mg tablet,extended release 24 hr   Take 1 tablet every day by oral route.    ProviderBeth MD   metFORMIN (GLUCOPHAGE) 1000 MG tablet metformin 1,000 mg tablet    Beth Cho MD   methocarbamol (ROBAXIN) 500 MG tablet methocarbamol 500 mg tablet   Take  1 tablet every day by oral route.    Provider, MD Beth   raNITIdine (ZANTAC) 150 MG tablet ranitidine 150 mg tablet    Provider, MD Beth         aspirin 81 mg Oral Daily   heparin (porcine) 5,000 Units Subcutaneous Q8H   insulin aspart 0-9 Units Subcutaneous TID AC   insulin aspart 1-10 Units Subcutaneous TID With Meals   insulin detemir 15 Units Subcutaneous Q12H   pantoprazole 40 mg Intravenous Q AM   sodium chloride 10 mL Intravenous Q12H   sodium chloride 3 mL Intravenous Q12H       sodium chloride 100 mL/hr Last Rate: Stopped (08/08/20 0847)   sodium chloride 10 mL/hr        Objective    Temp:  [98.2 °F (36.8 °C)-98.5 °F (36.9 °C)] 98.2 °F (36.8 °C)  Heart Rate:  [] 105  Resp:  [18-20] 18  BP: (115-171)/() 130/64    Physical Exam   Constitutional: He is oriented to person, place, and time. He appears well-developed and well-nourished. No distress.   HENT:   Head: Normocephalic and atraumatic.   Nose: Nose normal.   Eyes: Conjunctivae and EOM are normal.   Neck: Normal range of motion. Neck supple.   Cardiovascular: Normal rate, regular rhythm and normal heart sounds.   No murmur heard.  Pulmonary/Chest: Effort normal and breath sounds normal. No respiratory distress. He has no wheezes. He has no rales.   Abdominal: Soft. Bowel sounds are normal. He exhibits no distension. There is no tenderness. There is no guarding.   Musculoskeletal: Normal range of motion.   Neurological: He is alert and oriented to person, place, and time.   Skin: Skin is warm and dry.   Psychiatric: He has a normal mood and affect. His behavior is normal.   Vitals reviewed.        Results Review:  I have reviewed the labs, radiology results, and diagnostic studies.    Laboratory Data:   Results from last 7 days   Lab Units 08/08/20  0427 08/07/20  0815 08/07/20  0416  08/05/20  1927   SODIUM mmol/L 148* 152* 153*   < > 140   POTASSIUM mmol/L 5.4* 4.3 4.2   < > 6.4*   CHLORIDE mmol/L 111* 119* 119*   < > 102   CO2  mmol/L 24.0 23.0 25.0   < > 20.0*   BUN mg/dL 39* 59* 64*   < > 71*   CREATININE mg/dL 1.79* 2.53* 2.38*   < > 2.83*   GLUCOSE mg/dL 346* 115* 203*   < > 1,116*   CALCIUM mg/dL 9.0 9.4 9.5   < > 9.7   BILIRUBIN mg/dL  --   --   --   --  0.2   ALK PHOS U/L  --   --   --   --  105   ALT (SGPT) U/L  --   --   --   --  18   AST (SGOT) U/L  --   --   --   --  21   ANION GAP mmol/L 13.0 10.0 9.0   < > 18.0*    < > = values in this interval not displayed.     Estimated Creatinine Clearance: 84 mL/min (A) (by C-G formula based on SCr of 1.79 mg/dL (H)).  Results from last 7 days   Lab Units 08/08/20 0427 08/07/20 0416 08/06/20 0354 08/05/20  1927 08/05/20  1811   MAGNESIUM mg/dL 2.0 2.1 1.9 2.6 3.2*   PHOSPHORUS mg/dL  --   --   --  5.7* 6.7*         Results from last 7 days   Lab Units 08/08/20 0427 08/07/20 0416 08/06/20  0354 08/05/20  1811   WBC 10*3/mm3 7.71 10.30 13.72* 16.03*   HEMOGLOBIN g/dL 12.8* 13.2 11.9* 16.1   HEMATOCRIT % 40.6 41.2 37.4* 50.5   PLATELETS 10*3/mm3 167 192 174 243     Results from last 7 days   Lab Units 08/05/20  1811   INR  1.06       Culture Data:   Blood Culture   Date Value Ref Range Status   08/05/2020 No growth at 2 days  Preliminary   08/05/2020 No growth at 2 days  Preliminary     Urine Culture   Date Value Ref Range Status   08/06/2020 50,000 CFU/mL Mixed Ioana Isolated  Final     No results found for: RESPCX  No results found for: WOUNDCX  No results found for: STOOLCX  No components found for: BODYFLD    Radiology Data:   Imaging Results (Last 24 Hours)     Procedure Component Value Units Date/Time    US Renal Bilateral [[id number]] Collected:  08/07/20 1652     Updated:  08/07/20 1737    Narrative:       PROCEDURE: US RETROPERITONEUM    Clinical History: ckd 3 [place] rule out hydro, A41.9 Sepsis,  unspecified organism R65.20 Severe sepsis without septic shock  E11.10 Type 2 diabetes mellitus with ketoacidosis without coma  N17.9 Acute kidney failure, unspecified R79.89 Other  specified  abnormal findings of blood chemistry J18.9 Pneumonia, unspecified  organism    Indication: Same as above    Comparison: None.    Technique: Grayscale and color Doppler evaluation of the  bilateral kidneys and the urinary bladder was done.    Findings:     The right kidney measures 12.9 x 6.9 x 7.3  centimeters  and the  left kidney measures 14.2 x 5.9 x 6.9  centimeters.     The urinary bladder volume at the time of imaging was 112 cc.     The bilateral kidneys do not  show any evidence of hydronephrosis  or nephrolithiasis.    Survey evaluation of the urinary bladder does not show any gross  abnormalities.    The study is somewhat limited due to patient's body habitus      Impression:       Impression:    The study is somewhat limited due to patient's body habitus.  Grossly normal bilateral kidneys and urinary bladder.     Electronically signed by:  Chris Beverly MD  8/7/2020 5:36 PM CDT  Workstation: [id number]          I have reviewed the patient's current medications.     Assessment/Plan   Combination of DKA and HHS  · In the setting of diabetes mellitus type 2  · Anion gap has closed  · Increase Levemir to 20 units BID  · Will need outpatient follow up with endocrinology  · A1c 16.2    Acute kidney injury on chronic kidney disease  · Patient stated that he was discharge from [facility] in the first days of July/20 and he was told to follow up with a nephrologist  · Nephrology consulted, changed IVF to 1/2NS     Troponin elevated  ·    Will do follow up troponin and consult cardiology  ·    No chest pain reported    Sepsis - uncertain   · Being treated for PNA  · Procalcitonin elevated at 0.38  · Denies respiratory symptoms, will continue empiric treatment  · Lactate ordered     Transfer to the floor.    Discharge Planning: I expect patient to be discharged to home in 2 to 3 days    Tete Hermosillo MD        This document has been electronically signed by Tete Hermosillo MD on August  8, 2020 11:58

## 2020-08-08 NOTE — CONSULTS
[facility]iology at [facility]  Cardiovascular Consultation Note      Kenny Lopez  09/A  [id number]  1975    DATE OF ADMISSION: 8/5/2020  DATE OF CONSULTATION:  8/8/2020    Maninder Dixon MD  Treatment Team:   Attending Provider: Jed De Luna MD  Consulting Physician: Trent Celeste MD  Consulting Physician: Lico Salvador MD  Consulting Physician: Tommy Rowland MD  Admitting Provider: Jed De Luna MD    Chief Complaint   Patient presents with   • Hyperglycemia   • Chest Pain   • Abdominal Pain   • Vomiting       Chief complaint/ Reason for Consultation; nondiagnostic troponin multifactorial etiology in the patient admitted with markedly elevated glucose more than 1000, hyperkalemia and acute renal injury with history of nonischemic cardiomyopathy and status post ICD      History of Present Illness  Body mass index is 45.58 kg/m². BMI is above normal parameters. Recommendations include: exercise counseling, nutrition counseling and referral to primary care.    45 years old patient with a background history of obesity, metabolic syndrome, hypertension, poorly controlled diabetes, hyperlipidemia, history of nonischemic adenopathy status post ICD who presented for evaluations of uncontrolled blood glucose with a symptom of nauseous vomiting fatigability and found to have markedly elevated glucose greater than 1000 with hyperkalemia and acute renal injury and the dehydration's.  Patient was evaluated with Dr. Salvador and electrolyte was corrected renal function to get better and is in the process of being transferred to [facility].  The patient denies orthopnea or PND or chest pain fever cough or chills.  His troponin is nondiagnostic multifactorial in etiology given the hyperkalemia severe hyperglycemia and acute renal injury.  EKG sinus rhythm without acute ST-T wave changes.  He denies any chest pain syncope or near syncope.      Lactate  0.5 - 2.0 mmol/L 3.6High Critical          Troponin T  0.000 - 0.030 ng/mL 0.485High Critical   0.751High Critical   0.740High Critical   0.646High Critical   0.313High Critical      Glucose  65 - 99 mg/dL 1,116High Critical     BUN  6 - 20 mg/dL 71High     Creatinine  0.76 - 1.27 mg/dL 2.83High     Sodium  136 - 145 mmol/L 140    Potassium  3.5 - 5.2 mmol/L 6.4High Critical     Chloride  98 - 107 mmol/L 102    CO2  22.0 - 29.0 mmol/L 20.0Low           Past Surgical History:  has no past surgical history on file.     Family History: Hypertension and diabetes     Social History: Endorses intermittent alcohol use and tobacco use        Allergies   Allergen Reactions   • Penicillins Unknown - Low Severity       No current facility-administered medications on file prior to encounter.      Current Outpatient Medications on File Prior to Encounter   Medication Sig Dispense Refill   • amLODIPine (NORVASC) 10 MG tablet amlodipine 10 mg tablet     • aspirin 81 MG chewable tablet aspirin 81 mg chewable tablet     • carvedilol (COREG) 25 MG tablet carvedilol 25 mg tablet     • Ergocalciferol (VITAMIN D2 PO) Vitamin D2 1,250 mcg (50,000 unit) capsule     • hydroCHLOROthiazide (HYDRODIURIL) 25 MG tablet hydrochlorothiazide 25 mg tablet     • Insulin Glargine (BASAGLAR KWIKPEN SC) Basaglar KwikPen U-100 Insulin 100 unit/mL (3 mL) subcutaneous     • isosorbide mononitrate (IMDUR) 30 MG 24 hr tablet isosorbide mononitrate ER 30 mg tablet,extended release 24 hr   Take 1 tablet every day by oral route.     • metFORMIN (GLUCOPHAGE) 1000 MG tablet metformin 1,000 mg tablet     • methocarbamol (ROBAXIN) 500 MG tablet methocarbamol 500 mg tablet   Take 1 tablet every day by oral route.     • raNITIdine (ZANTAC) 150 MG tablet ranitidine 150 mg tablet         Social History     Socioeconomic History   • Marital status:      Spouse name: Not on file   • Number of children: Not on file   • Years of education: Not on file   • Highest education level: Not on file    "  Tobacco Use   • Smoking status: Never Smoker   • Smokeless tobacco: Never Used   Substance and Sexual Activity   • Alcohol use: Never     Frequency: Never   • Drug use: Never   • Sexual activity: Defer           REVIEW OF SYSTEMS:   ROS  Constitutional: Positive for diaphoresis and fatigue. Negative for chills and fever.   Respiratory: Negative for cough, shortness of breath and wheezing.    Cardiovascular: Negative for chest pain, palpitations and leg swelling.   Gastrointestinal: Positive for diarrhea, nausea and vomiting. Negative for abdominal distention and abdominal pain.   Genitourinary: Negative for dysuria and hematuria.   Musculoskeletal: Negative for arthralgias and myalgias.   Skin: Negative for rash and wound.   Neurological: Positive for light-headedness. Negative for dizziness, syncope and headaches.      Objective:     Vitals:    08/08/20 0200 08/08/20 0547 08/08/20 0550 08/08/20 0800   BP: 145/89  134/71 130/64   BP Location:   Right arm Right arm   Patient Position:   Lying Lying   Pulse: 102  90 105   Resp:   18 18   Temp:   98.5 °F (36.9 °C) 98.2 °F (36.8 °C)   TempSrc:   Oral Oral   SpO2: 96%  97% 96%   Weight:  (!) 161 kg (355 lb 2.6 oz)     Height:         Body mass index is 45.58 kg/m².  Flowsheet Rows      First Filed Value   Admission Height  188 cm (74\") Documented at 08/05/2020 1838   Admission Weight  (!) 136 kg (300 lb 3.2 oz) Documented at 08/05/2020 1838          Intake/Output Summary (Last 24 hours) at 8/8/2020 1313  Last data filed at 8/8/2020 0847  Gross per 24 hour   Intake 4773.9 ml   Output 2600 ml   Net 2173.9 ml         Physical Exam   Physical Exam  Constitutional: He is oriented to person, place, and time. No distress.     HENT:   Head: Normocephalic and atraumatic.   Mouth/Throat: No oropharyngeal exudate.      Eyes: Pupils are equal, round, and reactive to light. Conjunctivae and EOM are normal.   Neck: Normal range of motion.   Cardiovascular: Normal rate, regular " rhythm, normal heart sounds and intact distal pulses.   No murmur heard.  Pulmonary/Chest: Effort normal and breath sounds normal. He has no wheezes. He has no rales.   Reduced breath sounds diffusely likely secondary to body habitus.   Abdominal: Soft. Bowel sounds are normal. He exhibits no distension. There is no tenderness. There is no guarding.   Musculoskeletal: Normal range of motion. He exhibits no edema or tenderness.   Neurological: He is alert and oriented to person, place, and time. No cranial nerve deficit.   Skin: No rash noted No erythema.  Radiology Review    US Renal Bilateral   Final Result   Impression:      The study is somewhat limited due to patient's body habitus.   Grossly normal bilateral kidneys and urinary bladder.       Electronically signed by:  Chris Beverly MD  8/7/2020 5:36 PM CDT   Workstation: [id number]      XR Chest PA & Lateral   Final Result   No acute cardiopulmonary process.      Electronically signed by:  Lauren Hi MD  8/7/2020   8:12 AM CDT Workstation: [id number]      XR Chest 1 View   Final Result   Hazy infiltrative change right mid to lower lung zone.      Electronically signed by:  Anthony Boyce MD  8/5/2020 6:30 PM CDT   Workstation: [id number]          Lab Results:  Lab Results (last 24 hours)     Procedure Component Value Units Date/Time    POC Glucose Once [[id number]]  (Abnormal) Collected:  08/08/20 1212    Specimen:  Blood Updated:  08/08/20 1225     Glucose 381 mg/dL      Comment: Result Not ConfirmedOperator: [id number] [facility] ID: [id number]       Lactic Acid, Plasma [[id number]]  (Abnormal) Collected:  08/08/20 1158    Specimen:  Blood Updated:  08/08/20 1224     Lactate 3.6 mmol/L     Hemoglobin A1c [[id number]]  (Abnormal) Collected:  08/08/20 0427    Specimen:  Blood Updated:  08/08/20 1055     Hemoglobin A1C 16.20 %     Narrative:       Hemoglobin A1C Ranges:    Increased Risk for Diabetes  5.7% to 6.4%  Diabetes                      >= 6.5%  Diabetic Goal                < 7.0%    Basic Metabolic Panel [[id number]]  (Abnormal) Collected:  08/08/20 0427    Specimen:  Blood Updated:  08/08/20 0617     Glucose 346 mg/dL      BUN 39 mg/dL      Creatinine 1.79 mg/dL      Sodium 148 mmol/L      Potassium 5.4 mmol/L      Chloride 111 mmol/L      CO2 24.0 mmol/L      Calcium 9.0 mg/dL      eGFR  African Amer 50 mL/min/1.73      BUN/Creatinine Ratio 21.8     Anion Gap 13.0 mmol/L     Narrative:       GFR Normal >60  Chronic Kidney Disease <60  Kidney Failure <15      Magnesium [[id number]]  (Normal) Collected:  08/08/20 0427    Specimen:  Blood Updated:  08/08/20 0614     Magnesium 2.0 mg/dL     CBC & Differential [[id number]] Collected:  08/08/20 0427    Specimen:  Blood Updated:  08/08/20 0546    Narrative:       The following orders were created for panel order CBC & Differential.  Procedure                               Abnormality         Status                     ---------                               -----------         ------                     CBC Auto Differential[[id number]]        Abnormal            Final result                 Please view results for these tests on the individual orders.    CBC Auto Differential [[id number]]  (Abnormal) Collected:  08/08/20 0427    Specimen:  Blood Updated:  08/08/20 0546     WBC 7.71 10*3/mm3      RBC 4.55 10*6/mm3      Hemoglobin 12.8 g/dL      Hematocrit 40.6 %      MCV 89.2 fL      MCH 28.1 pg      MCHC 31.5 g/dL      RDW 14.1 %      RDW-SD 46.1 fl      MPV 12.0 fL      Platelets 167 10*3/mm3      Neutrophil % 56.7 %      Lymphocyte % 28.4 %      Monocyte % 12.5 %      Eosinophil % 1.3 %      Basophil % 0.5 %      Immature Grans % 0.6 %      Neutrophils, Absolute 4.37 10*3/mm3      Lymphocytes, Absolute 2.19 10*3/mm3      Monocytes, Absolute 0.96 10*3/mm3      Eosinophils, Absolute 0.10 10*3/mm3      Basophils, Absolute 0.04 10*3/mm3      Immature Grans, Absolute 0.05 10*3/mm3      nRBC 0.0 /100 WBC      Protein / Creatinine Ratio, Urine - Urine, Clean Catch [[id number]] Collected:  08/07/20 1600    Specimen:  Urine, Clean Catch Updated:  08/08/20 0248     Protein/Creatinine Ratio, Urine 162.9 mg/G Crea      Creatinine, Urine 147.3 mg/dL      Total Protein, Urine 24.0 mg/dL     Creatinine, Urine, Random - Urine, Clean Catch [[id number]] Collected:  08/07/20 1600    Specimen:  Urine, Clean Catch Updated:  08/08/20 0125     Creatinine, Urine 147.3 mg/dL     Narrative:       Reference intervals for random urine have not been established.  Clinical usage is dependent upon physician's interpretation in combination with other laboratory tests.       POC Glucose Once [[id number]]  (Abnormal) Collected:  08/07/20 2158    Specimen:  Blood Updated:  08/07/20 2215     Glucose 309 mg/dL      Comment: : [id number] PAMELLA OLIVERRAMeter ID: [id number]       Blood Culture - Blood, Wrist, Left [[id number]] Collected:  08/05/20 1927    Specimen:  Blood from Wrist, Left Updated:  08/07/20 1930     Blood Culture No growth at 2 days    Blood Culture - Blood, Arm, Right [[id number]] Collected:  08/05/20 1811    Specimen:  Blood from Arm, Right Updated:  08/07/20 1815     Blood Culture No growth at 2 days    POC Glucose Once [[id number]]  (Abnormal) Collected:  08/07/20 1644    Specimen:  Blood Updated:  08/07/20 1702     Glucose 225 mg/dL      Comment: Sliding Scale AdminOperator: [id number] [facility] ID: [id number]       Sodium, Urine, Random - Urine, Clean Catch [[id number]] Collected:  08/07/20 1600    Specimen:  Urine, Clean Catch Updated:  08/07/20 1615     Sodium, Urine 50 mmol/L     Narrative:       Reference intervals for random urine have not been established.  Clinical usage is dependent upon physician's interpretation in combination with other laboratory tests.             I personally viewed and interpreted the patient's EKG/Telemetry data       Assessment/Plan:       Nondiagnostic troponin multifactorial  etiology  History of nonischemic cardiomyopathy status post ICD  3 uncontrolled diabetes with markedly elevated blood glucose, hyperkalemia and acute renal injury  #4 hypertension with hypertensive heart disease  5 metabolic syndrome    45 years old patient with a background history of nonischemic cardiomyopathy status post ICD admitted for management of uncontrolled diabetes with markedly elevated blood glucose, hyperkalemia, acute renal injury and noted to have nondiagnostic troponin trending downwards may be multifactorial etiology given the severe hyperglycemia, hyperkalemia, electrolyte abnormal abnormality with some metabolic acidosis, acute renal injury.  Patient has a history of nonischemic cardiomyopathy and he is not interested in further risk stratification or evaluation but agreed to get an echocardiogram.  Significantly low carbohydrate, low-fat, DASH diet and graded exercise exercise discussed with the patient.  Patient currently on sliding scale insulin and regular insulin for management of diabetes, subcu heparin for DVT prophylaxis and recommend to greatly start the patient's back on Coreg, amlodipine and if there is no contraindication ARB depends upon patient's renal condition Aldactone.  Echocardiogram study was arranged to assess the biventricular        Thank you for allowing me to participate in the care of Kenny Lopez. Feel free to contact me directly with any further questions or concerns.    Tommy Rowland MD  08/08/20  13:13.      Part of this note may be an electronic transcription/translation of spoken language to printed text using the Dragon Dictation system.

## 2020-08-09 LAB
ALBUMIN SERPL-MCNC: 3.3 G/DL (ref 3.5–5.2)
ALBUMIN/GLOB SERPL: 1.1 G/DL
ALP SERPL-CCNC: 76 U/L (ref 39–117)
ALT SERPL W P-5'-P-CCNC: 29 U/L (ref 1–41)
ANION GAP SERPL CALCULATED.3IONS-SCNC: 14 MMOL/L (ref 5–15)
AST SERPL-CCNC: 29 U/L (ref 1–40)
B-OH-BUTYR SERPL-MCNC: 1.7 MG/DL
BASOPHILS # BLD AUTO: 0.04 10*3/MM3 (ref 0–0.2)
BASOPHILS NFR BLD AUTO: 0.7 % (ref 0–1.5)
BILIRUB SERPL-MCNC: 0.3 MG/DL (ref 0–1.2)
BUN SERPL-MCNC: 24 MG/DL (ref 6–20)
BUN/CREAT SERPL: 17.3 (ref 7–25)
CALCIUM SPEC-SCNC: 8.9 MG/DL (ref 8.6–10.5)
CHLORIDE SERPL-SCNC: 110 MMOL/L (ref 98–107)
CO2 SERPL-SCNC: 21 MMOL/L (ref 22–29)
CREAT SERPL-MCNC: 1.39 MG/DL (ref 0.76–1.27)
DEPRECATED RDW RBC AUTO: 41.7 FL (ref 37–54)
EOSINOPHIL # BLD AUTO: 0.17 10*3/MM3 (ref 0–0.4)
EOSINOPHIL NFR BLD AUTO: 2.9 % (ref 0.3–6.2)
ERYTHROCYTE [DISTWIDTH] IN BLOOD BY AUTOMATED COUNT: 13.3 % (ref 12.3–15.4)
GFR SERPL CREATININE-BSD FRML MDRD: 67 ML/MIN/1.73
GLOBULIN UR ELPH-MCNC: 2.9 GM/DL
GLUCOSE BLDC GLUCOMTR-MCNC: 231 MG/DL (ref 70–130)
GLUCOSE BLDC GLUCOMTR-MCNC: 237 MG/DL (ref 70–130)
GLUCOSE BLDC GLUCOMTR-MCNC: 249 MG/DL (ref 70–130)
GLUCOSE BLDC GLUCOMTR-MCNC: 319 MG/DL (ref 70–130)
GLUCOSE BLDC GLUCOMTR-MCNC: 322 MG/DL (ref 70–130)
GLUCOSE SERPL-MCNC: 238 MG/DL (ref 65–99)
HCT VFR BLD AUTO: 37.4 % (ref 37.5–51)
HGB BLD-MCNC: 12 G/DL (ref 13–17.7)
IMM GRANULOCYTES # BLD AUTO: 0.03 10*3/MM3 (ref 0–0.05)
IMM GRANULOCYTES NFR BLD AUTO: 0.5 % (ref 0–0.5)
LYMPHOCYTES # BLD AUTO: 1.94 10*3/MM3 (ref 0.7–3.1)
LYMPHOCYTES NFR BLD AUTO: 33 % (ref 19.6–45.3)
MAGNESIUM SERPL-MCNC: 2.3 MG/DL (ref 1.6–2.6)
MCH RBC QN AUTO: 27.3 PG (ref 26.6–33)
MCHC RBC AUTO-ENTMCNC: 32.1 G/DL (ref 31.5–35.7)
MCV RBC AUTO: 85.2 FL (ref 79–97)
MONOCYTES # BLD AUTO: 0.73 10*3/MM3 (ref 0.1–0.9)
MONOCYTES NFR BLD AUTO: 12.4 % (ref 5–12)
NEUTROPHILS NFR BLD AUTO: 2.96 10*3/MM3 (ref 1.7–7)
NEUTROPHILS NFR BLD AUTO: 50.5 % (ref 42.7–76)
NRBC BLD AUTO-RTO: 0 /100 WBC (ref 0–0.2)
PLATELET # BLD AUTO: 155 10*3/MM3 (ref 140–450)
PMV BLD AUTO: 11.2 FL (ref 6–12)
POTASSIUM SERPL-SCNC: 4.5 MMOL/L (ref 3.5–5.2)
PROT SERPL-MCNC: 6.2 G/DL (ref 6–8.5)
RBC # BLD AUTO: 4.39 10*6/MM3 (ref 4.14–5.8)
SODIUM SERPL-SCNC: 145 MMOL/L (ref 136–145)
WBC # BLD AUTO: 5.87 10*3/MM3 (ref 3.4–10.8)

## 2020-08-09 PROCEDURE — 80053 COMPREHEN METABOLIC PANEL: CPT | Performed by: NURSE PRACTITIONER

## 2020-08-09 PROCEDURE — 63710000001 INSULIN ASPART PER 5 UNITS: Performed by: HOSPITALIST

## 2020-08-09 PROCEDURE — 63710000001 INSULIN DETEMIR PER 5 UNITS: Performed by: STUDENT IN AN ORGANIZED HEALTH CARE EDUCATION/TRAINING PROGRAM

## 2020-08-09 PROCEDURE — 82962 GLUCOSE BLOOD TEST: CPT

## 2020-08-09 PROCEDURE — 63710000001 INSULIN ASPART PER 5 UNITS: Performed by: STUDENT IN AN ORGANIZED HEALTH CARE EDUCATION/TRAINING PROGRAM

## 2020-08-09 PROCEDURE — 85025 COMPLETE CBC W/AUTO DIFF WBC: CPT | Performed by: STUDENT IN AN ORGANIZED HEALTH CARE EDUCATION/TRAINING PROGRAM

## 2020-08-09 PROCEDURE — 25010000002 HEPARIN (PORCINE) PER 1000 UNITS: Performed by: STUDENT IN AN ORGANIZED HEALTH CARE EDUCATION/TRAINING PROGRAM

## 2020-08-09 PROCEDURE — 83735 ASSAY OF MAGNESIUM: CPT | Performed by: STUDENT IN AN ORGANIZED HEALTH CARE EDUCATION/TRAINING PROGRAM

## 2020-08-09 RX ADMIN — INSULIN ASPART 4 UNITS: 100 INJECTION, SOLUTION INTRAVENOUS; SUBCUTANEOUS at 18:15

## 2020-08-09 RX ADMIN — INSULIN ASPART 4 UNITS: 100 INJECTION, SOLUTION INTRAVENOUS; SUBCUTANEOUS at 12:10

## 2020-08-09 RX ADMIN — HEPARIN SODIUM 5000 UNITS: 5000 INJECTION INTRAVENOUS; SUBCUTANEOUS at 06:14

## 2020-08-09 RX ADMIN — SODIUM CHLORIDE, PRESERVATIVE FREE 10 ML: 5 INJECTION INTRAVENOUS at 21:19

## 2020-08-09 RX ADMIN — HEPARIN SODIUM 5000 UNITS: 5000 INJECTION INTRAVENOUS; SUBCUTANEOUS at 15:33

## 2020-08-09 RX ADMIN — PANTOPRAZOLE SODIUM 40 MG: 40 INJECTION, POWDER, FOR SOLUTION INTRAVENOUS at 06:15

## 2020-08-09 RX ADMIN — INSULIN ASPART 10 UNITS: 100 INJECTION, SOLUTION INTRAVENOUS; SUBCUTANEOUS at 00:12

## 2020-08-09 RX ADMIN — INSULIN ASPART 4 UNITS: 100 INJECTION, SOLUTION INTRAVENOUS; SUBCUTANEOUS at 08:50

## 2020-08-09 RX ADMIN — HEPARIN SODIUM 5000 UNITS: 5000 INJECTION INTRAVENOUS; SUBCUTANEOUS at 21:18

## 2020-08-09 RX ADMIN — ASPIRIN 81 MG 81 MG: 81 TABLET ORAL at 08:52

## 2020-08-09 RX ADMIN — INSULIN ASPART 3 UNITS: 100 INJECTION, SOLUTION INTRAVENOUS; SUBCUTANEOUS at 08:50

## 2020-08-09 RX ADMIN — INSULIN DETEMIR 20 UNITS: 100 INJECTION, SOLUTION SUBCUTANEOUS at 08:42

## 2020-08-09 RX ADMIN — INSULIN DETEMIR 20 UNITS: 100 INJECTION, SOLUTION SUBCUTANEOUS at 21:19

## 2020-08-09 RX ADMIN — SODIUM CHLORIDE, PRESERVATIVE FREE 10 ML: 5 INJECTION INTRAVENOUS at 08:52

## 2020-08-09 RX ADMIN — SODIUM CHLORIDE 100 ML/HR: 4.5 INJECTION, SOLUTION INTRAVENOUS at 06:28

## 2020-08-09 RX ADMIN — INSULIN ASPART 7 UNITS: 100 INJECTION, SOLUTION INTRAVENOUS; SUBCUTANEOUS at 12:09

## 2020-08-09 NOTE — PROGRESS NOTES
[facility]  INPATIENT PROGRESS NOTE    Length of Stay: 4  Date of Admission: 8/5/2020  Primary Care Physician: Maninder Dixon MD    Subjective   (S) no pain, no fever, no nausea, no vomit, feeling better    Review of Systems   Constitutional: Negative for fever.   HENT: Negative for congestion and trouble swallowing.    Eyes: Negative for redness.   Respiratory: Negative for chest tightness and shortness of breath.    Cardiovascular: Negative for chest pain.   Genitourinary: Negative for difficulty urinating.   Musculoskeletal: Negative for arthralgias.   Neurological: Negative for dizziness and headaches.   Psychiatric/Behavioral: Negative for agitation and behavioral problems.     All pertinent negatives and positives are as above. All other systems have been reviewed and are negative unless otherwise stated.     Prior to Admission medications    Medication Sig Start Date End Date Taking? Authorizing Provider   amLODIPine (NORVASC) 10 MG tablet amlodipine 10 mg tablet    Beth Cho MD   aspirin 81 MG chewable tablet aspirin 81 mg chewable tablet    Beth Cho MD   carvedilol (COREG) 25 MG tablet carvedilol 25 mg tablet    Beth Cho MD   Ergocalciferol (VITAMIN D2 PO) Vitamin D2 1,250 mcg (50,000 unit) capsule    Beth Cho MD   hydroCHLOROthiazide (HYDRODIURIL) 25 MG tablet hydrochlorothiazide 25 mg tablet    Beth Cho MD   Insulin Glargine (BASAGLAR KWIKPEN SC) Basaglar KwikPen U-100 Insulin 100 unit/mL (3 mL) subcutaneous    Beth Cho MD   isosorbide mononitrate (IMDUR) 30 MG 24 hr tablet isosorbide mononitrate ER 30 mg tablet,extended release 24 hr   Take 1 tablet every day by oral route.    Beth Cho MD   metFORMIN (GLUCOPHAGE) 1000 MG tablet metformin 1,000 mg tablet    Beth Cho MD   methocarbamol (ROBAXIN) 500 MG tablet methocarbamol 500 mg tablet    Take 1 tablet every day by oral route.    Provider, MD Beth   raNITIdine (ZANTAC) 150 MG tablet ranitidine 150 mg tablet    Provider, MD Beth         aspirin 81 mg Oral Daily   heparin (porcine) 5,000 Units Subcutaneous Q8H   insulin aspart 0-9 Units Subcutaneous TID AC   insulin aspart 1-10 Units Subcutaneous TID With Meals   insulin detemir 20 Units Subcutaneous Q12H   pantoprazole 40 mg Intravenous Q AM   sodium chloride 10 mL Intravenous Q12H   sodium chloride 3 mL Intravenous Q12H       sodium chloride 10 mL/hr       Objective    Temp:  [96.1 °F (35.6 °C)-98.3 °F (36.8 °C)] 98.3 °F (36.8 °C)  Heart Rate:  [84-99] 91  Resp:  [18-19] 18  BP: (123-145)/(69-83) 127/83    Physical Exam  Obese patient, no in acute distress, morbid obese  Neck is supple; EOMI; oral mucous moist  Normal expansion of bilateral lungs; CTA x 2  Normal sinus rhythm; no murmurs  Abdomen globose, no tender to palpation; normal bowel sounds; hydrocele present; no inflammatory signs  Able to move upper and lower extremities  Alert and oriented x 4; no neuro focalization  He is calm, well behaved      Results Review:  I have reviewed the labs, radiology results, and diagnostic studies.    Laboratory Data:   Results from last 7 days   Lab Units 08/09/20  0624 08/08/20  1936 08/08/20  1607 08/08/20  0427 08/07/20  0815  08/05/20  1927   SODIUM mmol/L 145  --   --  148* 152*   < > 140   POTASSIUM mmol/L 4.5  --  5.1 5.4* 4.3   < > 6.4*   CHLORIDE mmol/L 110*  --   --  111* 119*   < > 102   CO2 mmol/L 21.0*  --   --  24.0 23.0   < > 20.0*   BUN mg/dL 24*  --   --  39* 59*   < > 71*   CREATININE mg/dL 1.39*  --   --  1.79* 2.53*   < > 2.83*   GLUCOSE mg/dL 238* 443*  --  346* 115*   < > 1,116*   CALCIUM mg/dL 8.9  --   --  9.0 9.4   < > 9.7   BILIRUBIN mg/dL 0.3  --   --   --   --   --  0.2   ALK PHOS U/L 76  --   --   --   --   --  105   ALT (SGPT) U/L 29  --   --   --   --   --  18   AST (SGOT) U/L 29  --   --   --   --   --  21      ANION GAP mmol/L 14.0  --   --  13.0 10.0   < > 18.0*    < > = values in this interval not displayed.     Estimated Creatinine Clearance: 107.3 mL/min (A) (by C-G formula based on SCr of 1.39 mg/dL (H)).  Results from last 7 days   Lab Units 08/09/20  0624 08/08/20 0427 08/07/20 0416  08/05/20  1927 08/05/20  1811   MAGNESIUM mg/dL 2.3 2.0 2.1   < > 2.6 3.2*   PHOSPHORUS mg/dL  --   --   --   --  5.7* 6.7*    < > = values in this interval not displayed.         Results from last 7 days   Lab Units 08/09/20 0624 08/08/20 0427 08/07/20 0416 08/06/20  0354 08/05/20  1811   WBC 10*3/mm3 5.87 7.71 10.30 13.72* 16.03*   HEMOGLOBIN g/dL 12.0* 12.8* 13.2 11.9* 16.1   HEMATOCRIT % 37.4* 40.6 41.2 37.4* 50.5   PLATELETS 10*3/mm3 155 167 192 174 243     Results from last 7 days   Lab Units 08/05/20  1811   INR  1.06       Culture Data:   No results found for: BLOODCX  No results found for: URINECX  No results found for: RESPCX  No results found for: WOUNDCX  No results found for: STOOLCX  No components found for: BODYFLD    Radiology Data:   Imaging Results (Last 24 Hours)     ** No results found for the last 24 hours. **          I have reviewed the patient's current medications.     Assessment/Plan   Combination of DKA and HHS     In the setting of diabetes mellitus type 2     Resolved     On long acting insulin and SSI; A1C is 16.20     Discussed with him lifestyle changes    Acute kidney injury on chronic kidney disease     Patient stated that he was discharge from [facility] in the first days of July/20 and he was told to follow up with a nephrologist but he didn't      Resolving; Cr today 1.39     Appreciated nephrology evaluation and recommendations    Troponin elevated     Cardiology on the case; no chest pain reported       Appreciated cardiology recommendations    Sepsis?     Being treated for PNA; ruled out; sepsis ruled out     Doubtful; x ray 2 views with no acute disease; procalcitonin trending down;  discontinued antibiotics 2 days ago; check again tomorrow am, lactic acid and procalcitonin    Discharge Planning: I expect patient to be discharged to home tomorrow    Trent Celeste MD

## 2020-08-09 NOTE — PLAN OF CARE
Problem: Patient Care Overview  Goal: Plan of Care Review  Outcome: Ongoing (interventions implemented as appropriate)  Flowsheets (Taken 8/9/2020 1614)  Progress: improving  Plan of Care Reviewed With: patient  Note:   Pt has expressed no pain or discomfort. Blood sugar readings have been in 200s to 300s. Vital signs stable.

## 2020-08-09 NOTE — PROGRESS NOTES
"[facility]  [address] [place] [address]. [address]  T - [phone]  F - [phone]     Progress Note          PATIENT  DEMOGRAPHICS   PATIENT NAME: Kenny Lopez                      PHYSICIAN: PONCE Acharya  : 1975  MRN: [id number]   LOS: 4 days    Patient Care Team:  Maninder Dixon MD as PCP - General (Pulmonary Disease)  Subjective   SUBJECTIVE   Feeling better.         Objective   OBJECTIVE   Vital Signs  Temp:  [96.1 °F (35.6 °C)-97.5 °F (36.4 °C)] 97.3 °F (36.3 °C)  Heart Rate:  [] 87  Resp:  [18-19] 19  BP: (123-145)/(69-92) 126/78    Flowsheet Rows      First Filed Value   Admission Height  188 cm (74\") Documented at 2020   Admission Weight  (!) 136 kg (300 lb 3.2 oz) Documented at 2020           I/O last 3 completed shifts:  In: 5306.8 [P.O.:360; I.V.:4946.8]  Out: 4125 [Urine:4125]    PHYSICAL EXAM    Physical Exam   Constitutional: He is oriented to person, place, and time. He appears well-developed and well-nourished.   HENT:   Head: Normocephalic and atraumatic.   Eyes: Pupils are equal, round, and reactive to light. Conjunctivae and EOM are normal.   Cardiovascular: Normal rate and regular rhythm.   Pulmonary/Chest: Effort normal and breath sounds normal.   Abdominal: Soft.   Neurological: He is alert and oriented to person, place, and time.   Skin: Skin is warm and dry.       RESULTS   Results Review:    Results from last 7 days   Lab Units 20  0624 20  1936 20  1607 20  0427 20  0815  20  1927   SODIUM mmol/L 145  --   --  148* 152*   < > 140   POTASSIUM mmol/L 4.5  --  5.1 5.4* 4.3   < > 6.4*   CHLORIDE mmol/L 110*  --   --  111* 119*   < > 102   CO2 mmol/L 21.0*  --   --  24.0 23.0   < > 20.0*   BUN mg/dL 24*  --   --  39* 59*   < > 71*   CREATININE mg/dL 1.39*  --   --  1.79* 2.53*   < > 2.83*   CALCIUM mg/dL 8.9  --   --  9.0 9.4   < > 9.7   BILIRUBIN mg/dL 0.3  --   --   --   " --   --  0.2   ALK PHOS U/L 76  --   --   --   --   --  105   ALT (SGPT) U/L 29  --   --   --   --   --  18   AST (SGOT) U/L 29  --   --   --   --   --  21   GLUCOSE mg/dL 238* 443*  --  346* 115*   < > 1,116*    < > = values in this interval not displayed.       Estimated Creatinine Clearance: 83.3 mL/min (A) (by C-G formula based on SCr of 1.79 mg/dL (H)).    Results from last 7 days   Lab Units 08/09/20  0624 08/08/20 0427 08/07/20 0416  08/05/20  1927 08/05/20  1811   MAGNESIUM mg/dL 2.3 2.0 2.1   < > 2.6 3.2*   PHOSPHORUS mg/dL  --   --   --   --  5.7* 6.7*    < > = values in this interval not displayed.             Results from last 7 days   Lab Units 08/09/20 0624 08/08/20 0427 08/07/20 0416 08/06/20  0354 08/05/20  1811   WBC 10*3/mm3 5.87 7.71 10.30 13.72* 16.03*   HEMOGLOBIN g/dL 12.0* 12.8* 13.2 11.9* 16.1   PLATELETS 10*3/mm3 155 167 192 174 243       Results from last 7 days   Lab Units 08/05/20  1811   INR  1.06         Imaging Results (Last 24 Hours)     ** No results found for the last 24 hours. **           MEDICATIONS      aspirin 81 mg Oral Daily   heparin (porcine) 5,000 Units Subcutaneous Q8H   insulin aspart 0-9 Units Subcutaneous TID AC   insulin aspart 1-10 Units Subcutaneous TID With Meals   insulin detemir 20 Units Subcutaneous Q12H   pantoprazole 40 mg Intravenous Q AM   sodium chloride 10 mL Intravenous Q12H   sodium chloride 3 mL Intravenous Q12H       sodium chloride 100 mL/hr Last Rate: 100 mL/hr (08/09/20 0628)   sodium chloride 10 mL/hr        Assessment/Plan   ASSESSMENT / PLAN      Diabetic ketoacidosis without coma (CMS/HCC)    Severe sepsis (CMS/HCC)    Essential hypertension    Elevated troponin    Community acquired pneumonia of right middle lobe of lung    1.  HEATHER on CKD3-  We do not have any baseline creatinine.  His bicarb is corrected.  He is making good urine.     -Cr improving, continue IVF. Acute kidney injury is likely related to prerenal azotemia.    -Cr better,  stop IVF     2.  DKA versus HHS-  improved     3.  HTN- stable, antihypertensives on hold     4.  NICM status post AICD.    5.  Hypernatremia- Change IVF to 1/2 NS at 100 ml/hr.    6.  Hyperkalemia- Check K this afternoon.           This document has been electronically signed by PONCE Acharya on August 9, 2020 10:42

## 2020-08-09 NOTE — PLAN OF CARE
Problem: Patient Care Overview  Goal: Plan of Care Review  Outcome: Ongoing (interventions implemented as appropriate)  Flowsheets  Taken 8/9/2020 0104  Progress: no change  Outcome Summary: Patient currently resting with no complaints. VSS. Glucose elevated in the 400's. 10 units of novolog ordered and given. Will continue to monitor.  Taken 8/8/2020 2119  Plan of Care Reviewed With: patient

## 2020-08-10 LAB
ALBUMIN SERPL-MCNC: 3.3 G/DL (ref 3.5–5.2)
ALBUMIN/GLOB SERPL: 1.1 G/DL
ALP SERPL-CCNC: 74 U/L (ref 39–117)
ALT SERPL W P-5'-P-CCNC: 65 U/L (ref 1–41)
ANION GAP SERPL CALCULATED.3IONS-SCNC: 11 MMOL/L (ref 5–15)
AST SERPL-CCNC: 94 U/L (ref 1–40)
BACTERIA SPEC AEROBE CULT: NORMAL
BACTERIA SPEC AEROBE CULT: NORMAL
BASOPHILS # BLD AUTO: 0.05 10*3/MM3 (ref 0–0.2)
BASOPHILS NFR BLD AUTO: 0.8 % (ref 0–1.5)
BILIRUB SERPL-MCNC: 0.3 MG/DL (ref 0–1.2)
BUN SERPL-MCNC: 21 MG/DL (ref 6–20)
BUN/CREAT SERPL: 17.4 (ref 7–25)
CALCIUM SPEC-SCNC: 9.2 MG/DL (ref 8.6–10.5)
CHLORIDE SERPL-SCNC: 104 MMOL/L (ref 98–107)
CO2 SERPL-SCNC: 22 MMOL/L (ref 22–29)
CREAT SERPL-MCNC: 1.21 MG/DL (ref 0.76–1.27)
D-LACTATE SERPL-SCNC: 1.5 MMOL/L (ref 0.5–2)
DEPRECATED RDW RBC AUTO: 41.4 FL (ref 37–54)
EOSINOPHIL # BLD AUTO: 0.19 10*3/MM3 (ref 0–0.4)
EOSINOPHIL NFR BLD AUTO: 2.9 % (ref 0.3–6.2)
ERYTHROCYTE [DISTWIDTH] IN BLOOD BY AUTOMATED COUNT: 13.2 % (ref 12.3–15.4)
GFR SERPL CREATININE-BSD FRML MDRD: 79 ML/MIN/1.73
GLOBULIN UR ELPH-MCNC: 3 GM/DL
GLUCOSE BLDC GLUCOMTR-MCNC: 249 MG/DL (ref 70–130)
GLUCOSE BLDC GLUCOMTR-MCNC: 319 MG/DL (ref 70–130)
GLUCOSE BLDC GLUCOMTR-MCNC: 335 MG/DL (ref 70–130)
GLUCOSE BLDC GLUCOMTR-MCNC: 336 MG/DL (ref 70–130)
GLUCOSE SERPL-MCNC: 290 MG/DL (ref 65–99)
HCT VFR BLD AUTO: 35.4 % (ref 37.5–51)
HGB BLD-MCNC: 11.6 G/DL (ref 13–17.7)
IMM GRANULOCYTES # BLD AUTO: 0.04 10*3/MM3 (ref 0–0.05)
IMM GRANULOCYTES NFR BLD AUTO: 0.6 % (ref 0–0.5)
LYMPHOCYTES # BLD AUTO: 1.9 10*3/MM3 (ref 0.7–3.1)
LYMPHOCYTES NFR BLD AUTO: 29.4 % (ref 19.6–45.3)
MAGNESIUM SERPL-MCNC: 2.2 MG/DL (ref 1.6–2.6)
MCH RBC QN AUTO: 28 PG (ref 26.6–33)
MCHC RBC AUTO-ENTMCNC: 32.8 G/DL (ref 31.5–35.7)
MCV RBC AUTO: 85.3 FL (ref 79–97)
MONOCYTES # BLD AUTO: 0.85 10*3/MM3 (ref 0.1–0.9)
MONOCYTES NFR BLD AUTO: 13.1 % (ref 5–12)
NEUTROPHILS NFR BLD AUTO: 3.44 10*3/MM3 (ref 1.7–7)
NEUTROPHILS NFR BLD AUTO: 53.2 % (ref 42.7–76)
NRBC BLD AUTO-RTO: 0 /100 WBC (ref 0–0.2)
PLATELET # BLD AUTO: 137 10*3/MM3 (ref 140–450)
PMV BLD AUTO: 11.3 FL (ref 6–12)
POTASSIUM SERPL-SCNC: 4.4 MMOL/L (ref 3.5–5.2)
PROCALCITONIN SERPL-MCNC: 0.18 NG/ML (ref 0–0.25)
PROT SERPL-MCNC: 6.3 G/DL (ref 6–8.5)
RBC # BLD AUTO: 4.15 10*6/MM3 (ref 4.14–5.8)
RBC MORPH BLD: NORMAL
SMALL PLATELETS BLD QL SMEAR: ADEQUATE
SODIUM SERPL-SCNC: 137 MMOL/L (ref 136–145)
WBC # BLD AUTO: 6.47 10*3/MM3 (ref 3.4–10.8)
WBC MORPH BLD: NORMAL

## 2020-08-10 PROCEDURE — 84145 PROCALCITONIN (PCT): CPT | Performed by: INTERNAL MEDICINE

## 2020-08-10 PROCEDURE — 85025 COMPLETE CBC W/AUTO DIFF WBC: CPT | Performed by: STUDENT IN AN ORGANIZED HEALTH CARE EDUCATION/TRAINING PROGRAM

## 2020-08-10 PROCEDURE — 85007 BL SMEAR W/DIFF WBC COUNT: CPT | Performed by: STUDENT IN AN ORGANIZED HEALTH CARE EDUCATION/TRAINING PROGRAM

## 2020-08-10 PROCEDURE — 63710000001 INSULIN ASPART PER 5 UNITS: Performed by: STUDENT IN AN ORGANIZED HEALTH CARE EDUCATION/TRAINING PROGRAM

## 2020-08-10 PROCEDURE — 82962 GLUCOSE BLOOD TEST: CPT

## 2020-08-10 PROCEDURE — 25010000002 HEPARIN (PORCINE) PER 1000 UNITS: Performed by: STUDENT IN AN ORGANIZED HEALTH CARE EDUCATION/TRAINING PROGRAM

## 2020-08-10 PROCEDURE — 83735 ASSAY OF MAGNESIUM: CPT | Performed by: STUDENT IN AN ORGANIZED HEALTH CARE EDUCATION/TRAINING PROGRAM

## 2020-08-10 PROCEDURE — 80053 COMPREHEN METABOLIC PANEL: CPT | Performed by: NURSE PRACTITIONER

## 2020-08-10 PROCEDURE — 63710000001 INSULIN DETEMIR PER 5 UNITS: Performed by: STUDENT IN AN ORGANIZED HEALTH CARE EDUCATION/TRAINING PROGRAM

## 2020-08-10 PROCEDURE — 83605 ASSAY OF LACTIC ACID: CPT | Performed by: INTERNAL MEDICINE

## 2020-08-10 RX ADMIN — INSULIN ASPART 7 UNITS: 100 INJECTION, SOLUTION INTRAVENOUS; SUBCUTANEOUS at 17:30

## 2020-08-10 RX ADMIN — INSULIN ASPART 4 UNITS: 100 INJECTION, SOLUTION INTRAVENOUS; SUBCUTANEOUS at 17:30

## 2020-08-10 RX ADMIN — HEPARIN SODIUM 5000 UNITS: 5000 INJECTION INTRAVENOUS; SUBCUTANEOUS at 05:08

## 2020-08-10 RX ADMIN — INSULIN ASPART 3 UNITS: 100 INJECTION, SOLUTION INTRAVENOUS; SUBCUTANEOUS at 08:32

## 2020-08-10 RX ADMIN — PANTOPRAZOLE SODIUM 40 MG: 40 INJECTION, POWDER, FOR SOLUTION INTRAVENOUS at 05:08

## 2020-08-10 RX ADMIN — INSULIN DETEMIR 20 UNITS: 100 INJECTION, SOLUTION SUBCUTANEOUS at 08:33

## 2020-08-10 RX ADMIN — INSULIN ASPART 4 UNITS: 100 INJECTION, SOLUTION INTRAVENOUS; SUBCUTANEOUS at 08:31

## 2020-08-10 RX ADMIN — ASPIRIN 81 MG 81 MG: 81 TABLET ORAL at 08:31

## 2020-08-10 RX ADMIN — INSULIN DETEMIR 20 UNITS: 100 INJECTION, SOLUTION SUBCUTANEOUS at 21:08

## 2020-08-10 RX ADMIN — HEPARIN SODIUM 5000 UNITS: 5000 INJECTION INTRAVENOUS; SUBCUTANEOUS at 13:45

## 2020-08-10 RX ADMIN — SODIUM CHLORIDE, PRESERVATIVE FREE 10 ML: 5 INJECTION INTRAVENOUS at 21:08

## 2020-08-10 RX ADMIN — INSULIN ASPART 3 UNITS: 100 INJECTION, SOLUTION INTRAVENOUS; SUBCUTANEOUS at 13:46

## 2020-08-10 RX ADMIN — HEPARIN SODIUM 5000 UNITS: 5000 INJECTION INTRAVENOUS; SUBCUTANEOUS at 21:08

## 2020-08-10 RX ADMIN — INSULIN ASPART 7 UNITS: 100 INJECTION, SOLUTION INTRAVENOUS; SUBCUTANEOUS at 13:45

## 2020-08-10 RX ADMIN — SODIUM CHLORIDE, PRESERVATIVE FREE 10 ML: 5 INJECTION INTRAVENOUS at 08:31

## 2020-08-10 NOTE — PROGRESS NOTES
"[facility]  [address] [place] [address]. [address]  T - [phone]  F - [phone]     Progress Note          PATIENT  DEMOGRAPHICS   PATIENT NAME: Kenny Lopez                      PHYSICIAN: Lico Salvador MD  : 1975  MRN: [id number]   LOS: 5 days    Patient Care Team:  Maninder Dixon MD as PCP - General (Pulmonary Disease)  Subjective   SUBJECTIVE   Feeling better.         Objective   OBJECTIVE   Vital Signs  Temp:  [97 °F (36.1 °C)-98.3 °F (36.8 °C)] 97 °F (36.1 °C)  Heart Rate:  [82-91] 82  Resp:  [16-18] 18  BP: (127-152)/(72-92) 128/78    Flowsheet Rows      First Filed Value   Admission Height  188 cm (74\") Documented at 2020   Admission Weight  (!) 136 kg (300 lb 3.2 oz) Documented at 2020           I/O last 3 completed shifts:  In: 2850 [I.V.:2850]  Out: 2750 [Urine:2750]    PHYSICAL EXAM    Physical Exam   Constitutional: He is oriented to person, place, and time. He appears well-developed and well-nourished.   HENT:   Head: Normocephalic and atraumatic.   Eyes: Pupils are equal, round, and reactive to light. Conjunctivae and EOM are normal.   Cardiovascular: Normal rate and regular rhythm.   Pulmonary/Chest: Effort normal and breath sounds normal.   Abdominal: Soft.   Neurological: He is alert and oriented to person, place, and time.   Skin: Skin is warm and dry.       RESULTS   Results Review:    Results from last 7 days   Lab Units 08/10/20  0525 20  0624 20  1936 20  1607 20  0427  20  1927   SODIUM mmol/L 137 145  --   --  148*   < > 140   POTASSIUM mmol/L 4.4 4.5  --  5.1 5.4*   < > 6.4*   CHLORIDE mmol/L 104 110*  --   --  111*   < > 102   CO2 mmol/L 22.0 21.0*  --   --  24.0   < > 20.0*   BUN mg/dL 21* 24*  --   --  39*   < > 71*   CREATININE mg/dL 1.21 1.39*  --   --  1.79*   < > 2.83*   CALCIUM mg/dL 9.2 8.9  --   --  9.0   < > 9.7   BILIRUBIN mg/dL 0.3 0.3  --   --   --   --  0.2   ALK PHOS " U/L 74 76  --   --   --   --  105   ALT (SGPT) U/L 65* 29  --   --   --   --  18   AST (SGOT) U/L 94* 29  --   --   --   --  21   GLUCOSE mg/dL 290* 238* 443*  --  346*   < > 1,116*    < > = values in this interval not displayed.       Estimated Creatinine Clearance: 123.2 mL/min (by C-G formula based on SCr of 1.21 mg/dL).    Results from last 7 days   Lab Units 08/10/20  0525 08/09/20  0624 08/08/20  0427  08/05/20  1927 08/05/20  1811   MAGNESIUM mg/dL 2.2 2.3 2.0   < > 2.6 3.2*   PHOSPHORUS mg/dL  --   --   --   --  5.7* 6.7*    < > = values in this interval not displayed.             Results from last 7 days   Lab Units 08/10/20  0525 08/09/20  0624 08/08/20 0427 08/07/20  0416 08/06/20  0354   WBC 10*3/mm3 6.47 5.87 7.71 10.30 13.72*   HEMOGLOBIN g/dL 11.6* 12.0* 12.8* 13.2 11.9*   PLATELETS 10*3/mm3 137* 155 167 192 174       Results from last 7 days   Lab Units 08/05/20  1811   INR  1.06         Imaging Results (Last 24 Hours)     ** No results found for the last 24 hours. **           MEDICATIONS      aspirin 81 mg Oral Daily   heparin (porcine) 5,000 Units Subcutaneous Q8H   insulin aspart 0-9 Units Subcutaneous TID AC   insulin aspart 1-10 Units Subcutaneous TID With Meals   insulin detemir 20 Units Subcutaneous Q12H   pantoprazole 40 mg Intravenous Q AM   sodium chloride 10 mL Intravenous Q12H   sodium chloride 3 mL Intravenous Q12H       sodium chloride 10 mL/hr       Assessment/Plan   ASSESSMENT / PLAN      Diabetic ketoacidosis without coma (CMS/HCC)    Severe sepsis (CMS/HCC)    Essential hypertension    Elevated troponin    Community acquired pneumonia of right middle lobe of lung    1.  HEATHER on CKD3-  We do not have any baseline creatinine.  His bicarb is corrected.  He is making good urine. Urine protein 162 mg.     -Cr improving, now 1.3. Off IVF. Acute kidney injury is likely related to prerenal azotemia.    -will sign off here and see him in office in 2-3 weeks     2.  DKA versus HHS-   improved     3.  HTN- stable, antihypertensives on hold     4.  NICM status post AICD.    5.  Hypernatremia- now better    6.  Hyperkalemia- corrected and now stable         This document has been electronically signed by Lico Salvador MD on August 10, 2020 10:59

## 2020-08-10 NOTE — NURSING NOTE
Discharge Planning Assessment  [facility]     Patient Name: Kenny Lopez  MRN: [id number]  Today's Date: 8/10/2020    Admit Date: 8/5/2020    Discharge Needs Assessment     Row Name 08/10/20 1538       Living Environment    Lives With  child(laurita), dependent;spouse    Name(s) of Who Lives With Patient  Spouse, Salina and 3 dependent children, ages 9, 11, 13.    Current Living Arrangements  home/apartment/condo    Primary Care Provided by  self    Provides Primary Care For  child(laurita)    Family Caregiver if Needed  spouse    Family Caregiver Names  Salina    Quality of Family Relationships  helpful;involved;supportive    Able to Return to Prior Arrangements  yes       Resource/Environmental Concerns    Resource/Environmental Concerns  none    Transportation Concerns  car, none       Transition Planning    Patient/Family Anticipates Transition to  home with family    Patient/Family Anticipated Services at Transition  education services    Transportation Anticipated  family or friend will provide       Discharge Needs Assessment    Readmission Within the Last 30 Days  no previous admission in last 30 days    Concerns to be Addressed  compliance issue    Concerns Comments  Patient reports non-compliance with diet and insulin for DM.    Equipment Currently Used at Home  cane, straight;glucometer;bipap/cpap [facility] in [place], [place]    Anticipated Changes Related to Illness  none    Equipment Needed After Discharge  none    Provided Post Acute Provider List?  Refused    Refused Provider List Comment  Patient denies any home health or DME needs.    [facility]  [facility]    Discharge Coordination/Progress  Non-compliance with diabetic diet and medications to help control blood glucose.        Discharge Plan     Row Name 08/10/20 1543       Plan    Plan Comments  CM verified demographics, pcp, and [facility] pharmacy. He reports rx coverage with affordable copays. CM verified  coverage with pharmacy and patient has $0 copays. Patients blood glucose >1200 on admission. CM and patient spoke at length with compliance issues. Patient states he ran out of insulin needles for about a week prior to admission and just didn't pick them up. He also states his doctor had changed his insulin to just the long term and stopped the short acting insulin but that has been started back. CM discussed home health for diabetic education and glucose monitoring but patient states he was going to [facility] diabetic nurse education class prior to covid and he is going to start back. He states her name is Sita and he has her card. CM encouraged patient to do this. He denies any other needs or services.   GLORIA Howell        Destination      Coordination has not been started for this encounter.      Durable Medical Equipment      Coordination has not been started for this encounter.      Dialysis/Infusion      Coordination has not been started for this encounter.      Home Medical Care      Coordination has not been started for this encounter.      Therapy      Coordination has not been started for this encounter.      Community Resources      Coordination has not been started for this encounter.        Expected Discharge Date and Time     Expected Discharge Date Expected Discharge Time    Aug 10, 2020         Demographic Summary     Row Name 08/10/20 1536       General Information    Admission Type  inpatient    Arrived From  home    Referral Source  high risk screening LACE of 8    Preferred Language  English        Functional Status     Row Name 08/10/20 1538       Functional Status    Usual Activity Tolerance  good    Current Activity Tolerance  good       Functional Status, IADL    Medications  independent    Meal Preparation  independent    Housekeeping  independent    Laundry  independent    Shopping  independent       Mental Status    General Appearance WDL  WDL       Mental Status Summary    Recent Changes  in Mental Status/Cognitive Functioning  no changes       Employment/    Employment Status  disabled        Psychosocial    No documentation.       Abuse/Neglect    No documentation.       Legal    No documentation.       Substance Abuse    No documentation.       Patient Forms    No documentation.           Trina Robert RN

## 2020-08-10 NOTE — PLAN OF CARE
Problem: Patient Care Overview  Goal: Plan of Care Review  Outcome: Ongoing (interventions implemented as appropriate)  Flowsheets  Taken 8/10/2020 0337  Progress: improving  Outcome Summary: Patient currently resting with no complaints. VSS. Will continue to monitor.  Taken 8/9/2020 2110  Plan of Care Reviewed With: patient

## 2020-08-10 NOTE — PROGRESS NOTES
[facility] Medicine Services  INPATIENT PROGRESS NOTE    Length of Stay: 5  Date of Admission: 8/5/2020  Primary Care Physician: Maninder Dixon MD    Subjective   Chief Complaint: Hyperglycemia  HPI: Patient states he is feeling better today.  No specific complaints.    Review of Systems   Constitutional: Negative for appetite change, chills, fatigue and fever.   Respiratory: Negative for chest tightness and shortness of breath.    Cardiovascular: Negative for chest pain, palpitations and leg swelling.   Gastrointestinal: Negative for abdominal pain, constipation, diarrhea, nausea and vomiting.   Skin: Negative for wound.   Neurological: Negative for dizziness, weakness, light-headedness, numbness and headaches.        All pertinent negatives and positives are as above. All other systems have been reviewed and are negative unless otherwise stated.     Objective    Temp:  [97 °F (36.1 °C)-98.5 °F (36.9 °C)] 98.5 °F (36.9 °C)  Heart Rate:  [82-94] 94  Resp:  [16-18] 18  BP: (128-166)/(72-98) 166/98    Physical Exam   Constitutional: He appears well-developed and well-nourished.   Morbidly obese with a BMI of 45.10   HENT:   Head: Normocephalic and atraumatic.   Eyes: Pupils are equal, round, and reactive to light. EOM are normal.   Neck: Normal range of motion. Neck supple.   Cardiovascular: Normal rate, regular rhythm, normal heart sounds and intact distal pulses. Exam reveals no gallop and no friction rub.   No murmur heard.  Pulmonary/Chest: Effort normal and breath sounds normal. No respiratory distress. He has no wheezes. He has no rales. He exhibits no tenderness.   Abdominal: Soft. Bowel sounds are normal. He exhibits no distension. There is no tenderness.   Psychiatric: He has a normal mood and affect. His behavior is normal.   Vitals reviewed.          Results Review:  I have reviewed the labs, radiology results, and diagnostic studies.    Laboratory Data:      Results from last 7 days   Lab Units 08/10/20  0525 08/09/20  0624 08/08/20  1936 08/08/20  1607 08/08/20 0427 08/05/20 1927   SODIUM mmol/L 137 145  --   --  148*   < > 140   POTASSIUM mmol/L 4.4 4.5  --  5.1 5.4*   < > 6.4*   CHLORIDE mmol/L 104 110*  --   --  111*   < > 102   CO2 mmol/L 22.0 21.0*  --   --  24.0   < > 20.0*   BUN mg/dL 21* 24*  --   --  39*   < > 71*   CREATININE mg/dL 1.21 1.39*  --   --  1.79*   < > 2.83*   GLUCOSE mg/dL 290* 238* 443*  --  346*   < > 1,116*   CALCIUM mg/dL 9.2 8.9  --   --  9.0   < > 9.7   BILIRUBIN mg/dL 0.3 0.3  --   --   --   --  0.2   ALK PHOS U/L 74 76  --   --   --   --  105   ALT (SGPT) U/L 65* 29  --   --   --   --  18   AST (SGOT) U/L 94* 29  --   --   --   --  21   ANION GAP mmol/L 11.0 14.0  --   --  13.0   < > 18.0*    < > = values in this interval not displayed.     Estimated Creatinine Clearance: 123.2 mL/min (by C-G formula based on SCr of 1.21 mg/dL).  Results from last 7 days   Lab Units 08/10/20  0525 08/09/20  0624 08/08/20  0427  08/05/20  1927 08/05/20  1811   MAGNESIUM mg/dL 2.2 2.3 2.0   < > 2.6 3.2*   PHOSPHORUS mg/dL  --   --   --   --  5.7* 6.7*    < > = values in this interval not displayed.         Results from last 7 days   Lab Units 08/10/20  0525 08/09/20  0624 08/08/20  0427 08/07/20  0416 08/06/20  0354   WBC 10*3/mm3 6.47 5.87 7.71 10.30 13.72*   HEMOGLOBIN g/dL 11.6* 12.0* 12.8* 13.2 11.9*   HEMATOCRIT % 35.4* 37.4* 40.6 41.2 37.4*   PLATELETS 10*3/mm3 137* 155 167 192 174     Results from last 7 days   Lab Units 08/05/20  1811   INR  1.06       Culture Data:   No results found for: BLOODCX  No results found for: URINECX  No results found for: RESPCX  No results found for: WOUNDCX  No results found for: STOOLCX  No components found for: BODYFLD    Radiology Data:   Imaging Results (Last 24 Hours)     ** No results found for the last 24 hours. **          I have reviewed the patient's current medications.     Assessment/Plan     Active  Hospital Problems    Diagnosis   • **Diabetic ketoacidosis without coma (CMS/HCC)   • Severe sepsis (CMS/HCC)   • Essential hypertension   • Elevated troponin   • Community acquired pneumonia of right middle lobe of lung       Plan:    1.  Poorly controlled diabetes: Patient had DKA/HHS when he came in.  Patient admits to being noncompliant.  Strongly encouraged compliance.  Patient verbalizes the ability to get his diabetic supplies.  2.  Acute kidney injury: Unclear baseline.  Likely some degree of chronic kidney disease due to poorly controlled diabetes.  Nephrology has been following during hospital stay.  Likely new baseline.  3.  Morbid obesity:  BMI 45.10.  Dietary counseling.    The patient was evaluated during the global COVID-19 pandemic, and the diagnosis was suspected/considered upon their initial presentation.  Evaluation, treatment, and testing were consistent with current guidelines for patients who present with complaints or symptoms that may be related to COVID-19.        This document has been electronically signed by Taj Banks MD on August 10, 2020 17:52

## 2020-08-11 VITALS
HEART RATE: 85 BPM | TEMPERATURE: 98 F | OXYGEN SATURATION: 99 % | DIASTOLIC BLOOD PRESSURE: 92 MMHG | RESPIRATION RATE: 16 BRPM | HEIGHT: 74 IN | BODY MASS INDEX: 40.43 KG/M2 | WEIGHT: 315 LBS | SYSTOLIC BLOOD PRESSURE: 144 MMHG

## 2020-08-11 LAB
ALBUMIN SERPL-MCNC: 3.5 G/DL (ref 3.5–5.2)
ALBUMIN/GLOB SERPL: 1.2 G/DL
ALP SERPL-CCNC: 75 U/L (ref 39–117)
ALT SERPL W P-5'-P-CCNC: 123 U/L (ref 1–41)
ANION GAP SERPL CALCULATED.3IONS-SCNC: 12 MMOL/L (ref 5–15)
AST SERPL-CCNC: 141 U/L (ref 1–40)
BASOPHILS # BLD AUTO: 0.04 10*3/MM3 (ref 0–0.2)
BASOPHILS NFR BLD AUTO: 0.7 % (ref 0–1.5)
BILIRUB SERPL-MCNC: 0.3 MG/DL (ref 0–1.2)
BUN SERPL-MCNC: 17 MG/DL (ref 6–20)
BUN/CREAT SERPL: 16.2 (ref 7–25)
CALCIUM SPEC-SCNC: 9.1 MG/DL (ref 8.6–10.5)
CHLORIDE SERPL-SCNC: 103 MMOL/L (ref 98–107)
CO2 SERPL-SCNC: 21 MMOL/L (ref 22–29)
CREAT SERPL-MCNC: 1.05 MG/DL (ref 0.76–1.27)
DEPRECATED RDW RBC AUTO: 39.6 FL (ref 37–54)
EOSINOPHIL # BLD AUTO: 0.17 10*3/MM3 (ref 0–0.4)
EOSINOPHIL NFR BLD AUTO: 2.9 % (ref 0.3–6.2)
ERYTHROCYTE [DISTWIDTH] IN BLOOD BY AUTOMATED COUNT: 13 % (ref 12.3–15.4)
GFR SERPL CREATININE-BSD FRML MDRD: 93 ML/MIN/1.73
GLOBULIN UR ELPH-MCNC: 2.9 GM/DL
GLUCOSE BLDC GLUCOMTR-MCNC: 261 MG/DL (ref 70–130)
GLUCOSE BLDC GLUCOMTR-MCNC: 334 MG/DL (ref 70–130)
GLUCOSE BLDC GLUCOMTR-MCNC: 346 MG/DL (ref 70–130)
GLUCOSE SERPL-MCNC: 277 MG/DL (ref 65–99)
HCT VFR BLD AUTO: 35.5 % (ref 37.5–51)
HGB BLD-MCNC: 11.5 G/DL (ref 13–17.7)
IMM GRANULOCYTES # BLD AUTO: 0.02 10*3/MM3 (ref 0–0.05)
IMM GRANULOCYTES NFR BLD AUTO: 0.3 % (ref 0–0.5)
LYMPHOCYTES # BLD AUTO: 2.02 10*3/MM3 (ref 0.7–3.1)
LYMPHOCYTES NFR BLD AUTO: 34.7 % (ref 19.6–45.3)
MAGNESIUM SERPL-MCNC: 2.1 MG/DL (ref 1.6–2.6)
MCH RBC QN AUTO: 27.3 PG (ref 26.6–33)
MCHC RBC AUTO-ENTMCNC: 32.4 G/DL (ref 31.5–35.7)
MCV RBC AUTO: 84.3 FL (ref 79–97)
MONOCYTES # BLD AUTO: 0.71 10*3/MM3 (ref 0.1–0.9)
MONOCYTES NFR BLD AUTO: 12.2 % (ref 5–12)
NEUTROPHILS NFR BLD AUTO: 2.86 10*3/MM3 (ref 1.7–7)
NEUTROPHILS NFR BLD AUTO: 49.2 % (ref 42.7–76)
NRBC BLD AUTO-RTO: 0 /100 WBC (ref 0–0.2)
PLATELET # BLD AUTO: 154 10*3/MM3 (ref 140–450)
PMV BLD AUTO: 12 FL (ref 6–12)
POTASSIUM SERPL-SCNC: 4 MMOL/L (ref 3.5–5.2)
PROT SERPL-MCNC: 6.4 G/DL (ref 6–8.5)
RBC # BLD AUTO: 4.21 10*6/MM3 (ref 4.14–5.8)
SODIUM SERPL-SCNC: 136 MMOL/L (ref 136–145)
WBC # BLD AUTO: 5.82 10*3/MM3 (ref 3.4–10.8)

## 2020-08-11 PROCEDURE — 63710000001 INSULIN DETEMIR PER 5 UNITS: Performed by: STUDENT IN AN ORGANIZED HEALTH CARE EDUCATION/TRAINING PROGRAM

## 2020-08-11 PROCEDURE — 63710000001 INSULIN ASPART PER 5 UNITS: Performed by: STUDENT IN AN ORGANIZED HEALTH CARE EDUCATION/TRAINING PROGRAM

## 2020-08-11 PROCEDURE — 80053 COMPREHEN METABOLIC PANEL: CPT | Performed by: NURSE PRACTITIONER

## 2020-08-11 PROCEDURE — 82962 GLUCOSE BLOOD TEST: CPT

## 2020-08-11 PROCEDURE — 83735 ASSAY OF MAGNESIUM: CPT | Performed by: STUDENT IN AN ORGANIZED HEALTH CARE EDUCATION/TRAINING PROGRAM

## 2020-08-11 PROCEDURE — 25010000002 HEPARIN (PORCINE) PER 1000 UNITS: Performed by: STUDENT IN AN ORGANIZED HEALTH CARE EDUCATION/TRAINING PROGRAM

## 2020-08-11 PROCEDURE — 85025 COMPLETE CBC W/AUTO DIFF WBC: CPT | Performed by: STUDENT IN AN ORGANIZED HEALTH CARE EDUCATION/TRAINING PROGRAM

## 2020-08-11 PROCEDURE — 36415 COLL VENOUS BLD VENIPUNCTURE: CPT | Performed by: NURSE PRACTITIONER

## 2020-08-11 RX ORDER — INSULIN GLARGINE 100 [IU]/ML
40 INJECTION, SOLUTION SUBCUTANEOUS NIGHTLY
Qty: 15 ML | Refills: 0 | Status: SHIPPED | OUTPATIENT
Start: 2020-08-11

## 2020-08-11 RX ADMIN — INSULIN ASPART 6 UNITS: 100 INJECTION, SOLUTION INTRAVENOUS; SUBCUTANEOUS at 09:35

## 2020-08-11 RX ADMIN — SODIUM CHLORIDE, PRESERVATIVE FREE 10 ML: 5 INJECTION INTRAVENOUS at 08:07

## 2020-08-11 RX ADMIN — HEPARIN SODIUM 5000 UNITS: 5000 INJECTION INTRAVENOUS; SUBCUTANEOUS at 06:06

## 2020-08-11 RX ADMIN — INSULIN ASPART 10 UNITS: 100 INJECTION, SOLUTION INTRAVENOUS; SUBCUTANEOUS at 13:41

## 2020-08-11 RX ADMIN — INSULIN ASPART 6 UNITS: 100 INJECTION, SOLUTION INTRAVENOUS; SUBCUTANEOUS at 08:07

## 2020-08-11 RX ADMIN — PANTOPRAZOLE SODIUM 40 MG: 40 INJECTION, POWDER, FOR SOLUTION INTRAVENOUS at 06:06

## 2020-08-11 RX ADMIN — ASPIRIN 81 MG 81 MG: 81 TABLET ORAL at 08:06

## 2020-08-11 RX ADMIN — INSULIN ASPART 7 UNITS: 100 INJECTION, SOLUTION INTRAVENOUS; SUBCUTANEOUS at 11:34

## 2020-08-11 RX ADMIN — INSULIN DETEMIR 20 UNITS: 100 INJECTION, SOLUTION SUBCUTANEOUS at 08:08

## 2020-08-11 NOTE — PLAN OF CARE
Problem: Patient Care Overview  Goal: Plan of Care Review  Outcome: Ongoing (interventions implemented as appropriate)  Flowsheets (Taken 8/11/2020 0056)  Progress: improving  Plan of Care Reviewed With: patient  Patient Agreement with Plan of Care: agrees  Note:   Pt up ad oleksandr. Currently sleeping in bed. Vss. No s/s of distress at this time. Will continue to monitor.

## 2020-08-11 NOTE — PROGRESS NOTES
Nutrition Services    Patient Name:  Kenny Lopez  YOB: 1975  MRN: [id number]  Admit Date:  8/5/2020    Visited pt on discharge to discuss the meal plan for diabetes as a follow up to one of the other RDN's education. Reviewed carb counting, how to read a label, how many carbs to eat per meal(60) and showed pt info he has with him  to use at home. Phone number provided if questions arise at home or if pt wants to talk with an RDN  further on an outpatient basis.    Electronically signed by:  Mariana Miranda RD  08/11/20 15:45

## 2020-08-11 NOTE — DISCHARGE SUMMARY
[facility] Medicine Services  DISCHARGE SUMMARY       Date of Admission: 8/5/2020  Date of Discharge:  8/11/2020  Primary Care Physician: Maninder Dixon MD    Presenting Problem/History of Present Illness:  Elevated troponin [R79.89]  HEATHER (acute kidney injury) (CMS/Formerly [facility]) [N17.9]  Severe sepsis (CMS/Formerly [facility]) [A41.9, R65.20]  Diabetic ketoacidosis without coma associated with type 2 diabetes mellitus (CMS/Formerly [facility]) [E11.10]  Pneumonia of right lung due to infectious organism, unspecified part of lung [J18.9]   Final Discharge Diagnoses:  Active Hospital Problems    Diagnosis   • **Diabetic ketoacidosis without coma (CMS/Formerly [facility])   • Severe sepsis (CMS/Formerly [facility])   • Essential hypertension   • Elevated troponin   • Community acquired pneumonia of right middle lobe of lung       Consults:   Consults     Date and Time Order Name Status Description    8/8/2020 1137 Inpatient Cardiology Consult Completed     8/7/2020 1812 Inpatient Nephrology Consult      8/7/2020 0822 Inpatient Nephrology Consult Completed           Pertinent Test Results:   Lab Results (most recent)     Procedure Component Value Units Date/Time    Magnesium [[id number]]  (Normal) Collected:  08/11/20 0606    Specimen:  Blood Updated:  08/11/20 0729     Magnesium 2.1 mg/dL     Comprehensive Metabolic Panel [[id number]]  (Abnormal) Collected:  08/11/20 0606    Specimen:  Blood Updated:  08/11/20 0729     Glucose 277 mg/dL      BUN 17 mg/dL      Creatinine 1.05 mg/dL      Sodium 136 mmol/L      Potassium 4.0 mmol/L      Chloride 103 mmol/L      CO2 21.0 mmol/L      Calcium 9.1 mg/dL      Total Protein 6.4 g/dL      Albumin 3.50 g/dL      ALT (SGPT) 123 U/L      AST (SGOT) 141 U/L      Alkaline Phosphatase 75 U/L      Total Bilirubin 0.3 mg/dL      eGFR  African Amer 93 mL/min/1.73      Globulin 2.9 gm/dL      A/G Ratio 1.2 g/dL      BUN/Creatinine Ratio 16.2     Anion Gap 12.0 mmol/L     Narrative:       GFR Normal >60  Chronic  Kidney Disease <60  Kidney Failure <15      CBC & Differential [[id number]] Collected:  08/11/20 0606    Specimen:  Blood Updated:  08/11/20 0704    Narrative:       The following orders were created for panel order CBC & Differential.  Procedure                               Abnormality         Status                     ---------                               -----------         ------                     CBC Auto Differential[[id number]]        Abnormal            Final result                 Please view results for these tests on the individual orders.    CBC Auto Differential [[id number]]  (Abnormal) Collected:  08/11/20 0606    Specimen:  Blood Updated:  08/11/20 0704     WBC 5.82 10*3/mm3      RBC 4.21 10*6/mm3      Hemoglobin 11.5 g/dL      Hematocrit 35.5 %      MCV 84.3 fL      MCH 27.3 pg      MCHC 32.4 g/dL      RDW 13.0 %      RDW-SD 39.6 fl      MPV 12.0 fL      Platelets 154 10*3/mm3      Neutrophil % 49.2 %      Lymphocyte % 34.7 %      Monocyte % 12.2 %      Eosinophil % 2.9 %      Basophil % 0.7 %      Immature Grans % 0.3 %      Neutrophils, Absolute 2.86 10*3/mm3      Lymphocytes, Absolute 2.02 10*3/mm3      Monocytes, Absolute 0.71 10*3/mm3      Eosinophils, Absolute 0.17 10*3/mm3      Basophils, Absolute 0.04 10*3/mm3      Immature Grans, Absolute 0.02 10*3/mm3      nRBC 0.0 /100 WBC     POC Glucose Once [[id number]]  (Abnormal) Collected:  08/11/20 0606    Specimen:  Blood Updated:  08/11/20 0636     Glucose 261 mg/dL      Comment: RN NotifiedOperator: [id number] ALINE BILLSMeter ID: [id number]       POC Glucose Once [[id number]]  (Abnormal) Collected:  08/10/20 1948    Specimen:  Blood Updated:  08/11/20 0139     Glucose 346 mg/dL      Comment: RN NotifiedOperator: [id number] SCOTT SRIVASTAVAMeter ID: [id number]       Blood Culture - Blood, Wrist, Left [[id number]] Collected:  08/05/20 1927    Specimen:  Blood from Wrist, Left Updated:  08/10/20 1930     Blood Culture No growth at 5 days  "   Blood Culture - Blood, Arm, Right [[id number]] Collected:  08/05/20 1811    Specimen:  Blood from Arm, Right Updated:  08/10/20 1815     Blood Culture No growth at 5 days    Scan Slide [[id number]] Collected:  08/10/20 0525    Specimen:  Blood Updated:  08/10/20 0709     RBC Morphology Normal     WBC Morphology Normal     Platelet Estimate Adequate    Procalcitonin [[id number]]  (Normal) Collected:  08/10/20 0525    Specimen:  Blood Updated:  08/10/20 0600     Procalcitonin 0.18 ng/mL     Narrative:       As a Marker for Sepsis (Non-Neonates):   1. <0.5 ng/mL represents a low risk of severe sepsis and/or septic shock.  1. >2 ng/mL represents a high risk of severe sepsis and/or septic shock.    As a Marker for Lower Respiratory Tract Infections that require antibiotic therapy:  PCT on Admission     Antibiotic Therapy             6-12 Hrs later  > 0.5                Strongly Recommended            >0.25 - <0.5         Recommended  0.1 - 0.25           Discouraged                   Remeasure/reassess PCT  <0.1                 Strongly Discouraged          Remeasure/reassess PCT      As 28 day mortality risk marker: \"Change in Procalcitonin Result\" (> 80 % or <=80 %) if Day 0 (or Day 1) and Day 4 values are available. Refer to http://www.Friendfers-pct-calculator.com/   Change in PCT <=80 %   A decrease of PCT levels below or equal to 80 % defines a positive change in PCT test result representing a higher risk for 28-day all-cause mortality of patients diagnosed with severe sepsis or septic shock.  Change in PCT > 80 %   A decrease of PCT levels of more than 80 % defines a negative change in PCT result representing a lower risk for 28-day all-cause mortality of patients diagnosed with severe sepsis or septic shock.                Results may be falsely decreased if patient taking Biotin.     Magnesium [[id number]]  (Normal) Collected:  08/10/20 0525    Specimen:  Blood Updated:  08/10/20 0558     Magnesium 2.2 mg/dL     " Comprehensive Metabolic Panel [[id number]]  (Abnormal) Collected:  08/10/20 0525    Specimen:  Blood Updated:  08/10/20 0558     Glucose 290 mg/dL      BUN 21 mg/dL      Creatinine 1.21 mg/dL      Sodium 137 mmol/L      Potassium 4.4 mmol/L      Chloride 104 mmol/L      CO2 22.0 mmol/L      Calcium 9.2 mg/dL      Total Protein 6.3 g/dL      Albumin 3.30 g/dL      ALT (SGPT) 65 U/L      AST (SGOT) 94 U/L      Alkaline Phosphatase 74 U/L      Total Bilirubin 0.3 mg/dL      eGFR  African Amer 79 mL/min/1.73      Globulin 3.0 gm/dL      A/G Ratio 1.1 g/dL      BUN/Creatinine Ratio 17.4     Anion Gap 11.0 mmol/L     Narrative:       GFR Normal >60  Chronic Kidney Disease <60  Kidney Failure <15      Lactic Acid, Plasma [[id number]]  (Normal) Collected:  08/10/20 0525    Specimen:  Blood Updated:  08/10/20 0553     Lactate 1.5 mmol/L     CBC & Differential [[id number]] Collected:  08/10/20 0525    Specimen:  Blood Updated:  08/10/20 0545    Narrative:       The following orders were created for panel order CBC & Differential.  Procedure                               Abnormality         Status                     ---------                               -----------         ------                     CBC Auto Differential[[id number]]        Abnormal            Final result                 Please view results for these tests on the individual orders.    CBC Auto Differential [[id number]]  (Abnormal) Collected:  08/10/20 0525    Specimen:  Blood Updated:  08/10/20 0545     WBC 6.47 10*3/mm3      RBC 4.15 10*6/mm3      Hemoglobin 11.6 g/dL      Hematocrit 35.4 %      MCV 85.3 fL      MCH 28.0 pg      MCHC 32.8 g/dL      RDW 13.2 %      RDW-SD 41.4 fl      MPV 11.3 fL      Platelets 137 10*3/mm3      Neutrophil % 53.2 %      Lymphocyte % 29.4 %      Monocyte % 13.1 %      Eosinophil % 2.9 %      Basophil % 0.8 %      Immature Grans % 0.6 %      Neutrophils, Absolute 3.44 10*3/mm3      Lymphocytes, Absolute 1.90 10*3/mm3       Monocytes, Absolute 0.85 10*3/mm3      Eosinophils, Absolute 0.19 10*3/mm3      Basophils, Absolute 0.05 10*3/mm3      Immature Grans, Absolute 0.04 10*3/mm3      nRBC 0.0 /100 WBC     Beta-Hydroxybutyrate [[id number]] Collected:  08/06/20 0354    Specimen:  Blood Updated:  08/09/20 1308     Beta-Hydroxybutyrate Acid 1.7 mg/dL      Comment: Reference Range:  All Ages (fasting): 0.2 - 2.8       Narrative:       Performed at:  [facility]  [address] [place] [address]  [id number]  : Ruben Landers MD, Phone:  [phone]    Glucose, Random [[id number]]  (Abnormal) Collected:  08/08/20 1936    Specimen:  Blood Updated:  08/08/20 2021     Glucose 443 mg/dL     Potassium [[id number]]  (Normal) Collected:  08/08/20 1607    Specimen:  Blood Updated:  08/08/20 1628     Potassium 5.1 mmol/L      Comment: Specimen hemolyzed.  Results may be affected.       Lactic Acid, Plasma [[id number]]  (Abnormal) Collected:  08/08/20 1158    Specimen:  Blood Updated:  08/08/20 1224     Lactate 3.6 mmol/L     Hemoglobin A1c [[id number]]  (Abnormal) Collected:  08/08/20 0427    Specimen:  Blood Updated:  08/08/20 1055     Hemoglobin A1C 16.20 %     Narrative:       Hemoglobin A1C Ranges:    Increased Risk for Diabetes  5.7% to 6.4%  Diabetes                     >= 6.5%  Diabetic Goal                < 7.0%    Basic Metabolic Panel [[id number]]  (Abnormal) Collected:  08/08/20 0427    Specimen:  Blood Updated:  08/08/20 0617     Glucose 346 mg/dL      BUN 39 mg/dL      Creatinine 1.79 mg/dL      Sodium 148 mmol/L      Potassium 5.4 mmol/L      Chloride 111 mmol/L      CO2 24.0 mmol/L      Calcium 9.0 mg/dL      eGFR  African Amer 50 mL/min/1.73      BUN/Creatinine Ratio 21.8     Anion Gap 13.0 mmol/L     Narrative:       GFR Normal >60  Chronic Kidney Disease <60  Kidney Failure <15      Protein / Creatinine Ratio, Urine - Urine, Clean Catch [[id number]] Collected:  08/07/20 1600    Specimen:  Urine, Clean Catch  Updated:  08/08/20 0248     Protein/Creatinine Ratio, Urine 162.9 mg/G Crea      Creatinine, Urine 147.3 mg/dL      Total Protein, Urine 24.0 mg/dL     Creatinine, Urine, Random - Urine, Clean Catch [[id number]] Collected:  08/07/20 1600    Specimen:  Urine, Clean Catch Updated:  08/08/20 0125     Creatinine, Urine 147.3 mg/dL     Narrative:       Reference intervals for random urine have not been established.  Clinical usage is dependent upon physician's interpretation in combination with other laboratory tests.       Sodium, Urine, Random - Urine, Clean Catch [[id number]] Collected:  08/07/20 1600    Specimen:  Urine, Clean Catch Updated:  08/07/20 1615     Sodium, Urine 50 mmol/L     Narrative:       Reference intervals for random urine have not been established.  Clinical usage is dependent upon physician's interpretation in combination with other laboratory tests.       Urine Culture - Urine, Urine, Clean Catch [[id number]] Collected:  08/06/20 0443    Specimen:  Urine, Clean Catch Updated:  08/07/20 0847     Urine Culture 50,000 CFU/mL Mixed Austin Isolated    Narrative:       Specimen contains mixed organisms of questionable pathogenicity which indicates contamination with commensal austin.  Further identification is unlikely to provide clinically useful information.  Suggest recollection.    Hemoglobin A1C - Miscellaneous Test [[id number]] Collected:  08/05/20 1811    Specimen:  Blood Updated:  08/07/20 0845     Miscellaneous Lab Test Result See attached report    Basic Metabolic Panel [[id number]]  (Abnormal) Collected:  08/07/20 0815    Specimen:  Blood Updated:  08/07/20 0845     Glucose 115 mg/dL      BUN 59 mg/dL      Creatinine 2.53 mg/dL      Sodium 152 mmol/L      Potassium 4.3 mmol/L      Comment: Specimen hemolyzed.  Results may be affected.        Chloride 119 mmol/L      CO2 23.0 mmol/L      Calcium 9.4 mg/dL      eGFR  African Amer 34 mL/min/1.73      BUN/Creatinine Ratio 23.3     Anion Gap 10.0 mmol/L  "    Narrative:       GFR Normal >60  Chronic Kidney Disease <60  Kidney Failure <15      Procalcitonin [[id number]]  (Abnormal) Collected:  08/07/20 0416    Specimen:  Blood Updated:  08/07/20 0525     Procalcitonin 0.38 ng/mL     Narrative:       As a Marker for Sepsis (Non-Neonates):   1. <0.5 ng/mL represents a low risk of severe sepsis and/or septic shock.  1. >2 ng/mL represents a high risk of severe sepsis and/or septic shock.    As a Marker for Lower Respiratory Tract Infections that require antibiotic therapy:  PCT on Admission     Antibiotic Therapy             6-12 Hrs later  > 0.5                Strongly Recommended            >0.25 - <0.5         Recommended  0.1 - 0.25           Discouraged                   Remeasure/reassess PCT  <0.1                 Strongly Discouraged          Remeasure/reassess PCT      As 28 day mortality risk marker: \"Change in Procalcitonin Result\" (> 80 % or <=80 %) if Day 0 (or Day 1) and Day 4 values are available. Refer to http://www.Reedsypct-calculator.com/   Change in PCT <=80 %   A decrease of PCT levels below or equal to 80 % defines a positive change in PCT test result representing a higher risk for 28-day all-cause mortality of patients diagnosed with severe sepsis or septic shock.  Change in PCT > 80 %   A decrease of PCT levels of more than 80 % defines a negative change in PCT result representing a lower risk for 28-day all-cause mortality of patients diagnosed with severe sepsis or septic shock.                Results may be falsely decreased if patient taking Biotin.     Lactate Dehydrogenase [[id number]]  (Abnormal) Collected:  08/06/20 1826    Specimen:  Blood Updated:  08/06/20 1907      U/L     Troponin [[id number]]  (Abnormal) Collected:  08/06/20 1826    Specimen:  Blood Updated:  08/06/20 1906     Troponin T 0.485 ng/mL     Narrative:       Troponin T Reference Range:  <= 0.03 ng/mL-   Negative for AMI  >0.03 ng/mL-     Abnormal for myocardial " necrosis.  Clinicians would have to utilize clinical acumen, EKG, Troponin and serial changes to determine if it is an Acute Myocardial Infarction or myocardial injury due to an underlying chronic condition.       Results may be falsely decreased if patient taking Biotin.      Troponin [[id number]]  (Abnormal) Collected:  08/06/20 1115    Specimen:  Blood Updated:  08/06/20 1155     Troponin T 0.751 ng/mL     Narrative:       Troponin T Reference Range:  <= 0.03 ng/mL-   Negative for AMI  >0.03 ng/mL-     Abnormal for myocardial necrosis.  Clinicians would have to utilize clinical acumen, EKG, Troponin and serial changes to determine if it is an Acute Myocardial Infarction or myocardial injury due to an underlying chronic condition.       Results may be falsely decreased if patient taking Biotin.      Urinalysis, Microscopic Only - Urine, Clean Catch [[id number]]  (Abnormal) Collected:  08/06/20 0443    Specimen:  Urine, Clean Catch Updated:  08/06/20 0514     RBC, UA None Seen /HPF      WBC, UA 13-20 /HPF      Bacteria, UA 1+ /HPF      Squamous Epithelial Cells, UA 0-2 /HPF      Hyaline Casts, UA 3-6 /LPF      Methodology Manual Light Microscopy    Blood Gas, Arterial [[id number]]  (Abnormal) Collected:  08/06/20 0458    Specimen:  Arterial Blood Updated:  08/06/20 0511     Site Left Radial     Lawrence's Test N/A     pH, Arterial 7.324 pH units      Comment: 84 Value below reference range        pCO2, Arterial 42.0 mm Hg      pO2, Arterial 114.0 mm Hg      Comment: 83 Value above reference range        HCO3, Arterial 21.8 mmol/L      Base Excess, Arterial -4.1 mmol/L      Comment: 84 Value below reference range        O2 Saturation, Arterial 98.4 %      Barometric Pressure for Blood Gas 749 mmHg      Modality Nasal Cannula     Flow Rate 1.0 lpm      Ventilator Mode NA     Collected by RT     Comment: Meter: [id number]     :  [id number]       Urinalysis With Culture If Indicated - Urine, Clean Catch  [[id number]]  (Abnormal) Collected:  08/06/20 0443    Specimen:  Urine, Clean Catch Updated:  08/06/20 0455     Color, UA Yellow     Appearance, UA Cloudy     pH, UA <=5.0     Specific Gravity, UA 1.029     Glucose, UA >=1000 mg/dL (3+)     Ketones, UA Trace     Bilirubin, UA Negative     Blood, UA Negative     Protein, UA 30 mg/dL (1+)     Leuk Esterase, UA Moderate (2+)     Nitrite, UA Negative     Urobilinogen, UA 0.2 E.U./dL    COVID-19, [facility] IN-HOUSE, NP SWAB IN TRANSPORT MEDIA 8-10 HR TAT - Swab, Nasopharynx [[id number]]  (Normal) Collected:  08/05/20 2002    Specimen:  Swab from Nasopharynx Updated:  08/06/20 0308     COVID19 Not Detected    Narrative:       Testing performed by Real Time RT-PCR  This test has not been approved by the [facility] but is authorized under the Emergency Use Act (EUA)    Fact sheet for providers: https://www.fda.gov/media/426295/download    Fact sheet for patients: https://www.fda.gov/media/759073/download        Blood Gas, Arterial [[id number]]  (Abnormal) Collected:  08/06/20 0110    Specimen:  Arterial Blood Updated:  08/06/20 0122     Site Left Radial     Lawrence's Test N/A     pH, Arterial 7.309 pH units      Comment: 84 Value below reference range        pCO2, Arterial 46.1 mm Hg      Comment: 83 Value above reference range        pO2, Arterial 91.6 mm Hg      HCO3, Arterial 23.1 mmol/L      Base Excess, Arterial -3.4 mmol/L      Comment: 84 Value below reference range        O2 Saturation, Arterial 96.9 %      Barometric Pressure for Blood Gas 749 mmHg      Modality Nasal Cannula     Flow Rate 2.0 lpm      Ventilator Mode NA     Collected by RT     Comment: Meter: [id number]     :  [id number]       Lactic Acid, Reflex [[id number]]  (Abnormal) Collected:  08/05/20 2225    Specimen:  Blood Updated:  08/05/20 2303     Lactate 3.7 mmol/L     Ethanol [[id number]] Collected:  08/05/20 1927    Specimen:  Blood from Arm, Right Updated:  08/05/20 2014     Ethanol <10 mg/dL       Ethanol % <0.010 %     Phosphorus [[id number]]  (Abnormal) Collected:  08/05/20 1927    Specimen:  Blood from Arm, Right Updated:  08/05/20 1950     Phosphorus 5.7 mg/dL     Osmolality, Serum [[id number]]  (Abnormal) Collected:  08/05/20 1927    Specimen:  Blood Updated:  08/05/20 1944     Osmolality 384 mOsm/kg     Ketone Bodies, Serum (Not performed at [place]) [[id number]] Collected:  08/05/20 1927    Specimen:  Blood Updated:  08/05/20 1939    Narrative:       The following orders were created for panel order Ketone Bodies, Serum (Not performed at [place]).  Procedure                               Abnormality         Status                     ---------                               -----------         ------                     Acetone[[id number]]                      Abnormal            Final result                 Please view results for these tests on the individual orders.    Acetone [[id number]]  (Abnormal) Collected:  08/05/20 1927    Specimen:  Blood Updated:  08/05/20 1939     Acetone Moderate    Bath Draw [[id number]] Collected:  08/05/20 1811    Specimen:  Blood from Arm, Right Updated:  08/05/20 1915    Narrative:       The following orders were created for panel order Bath Draw.  Procedure                               Abnormality         Status                     ---------                               -----------         ------                     Light Blue Top[[id number]]                                   Final result               Green Top (Gel)[[id number]]                                  Final result               Lavender Top[[id number]]                                     Final result               Gold Top - SST[[id number]]                                                                Please view results for these tests on the individual orders.    Light Blue Top [[id number]] Collected:  08/05/20 1811    Specimen:  Blood from Arm, Right Updated:  08/05/20 1915     Extra Tube hold for  add-on     Comment: Auto resulted       Green Top (Gel) [[id number]] Collected:  08/05/20 1811    Specimen:  Blood from Arm, Right Updated:  08/05/20 1915     Extra Tube Hold for add-ons.     Comment: Auto resulted.       Lavender Top [[id number]] Collected:  08/05/20 1811    Specimen:  Blood from Arm, Right Updated:  08/05/20 1915     Extra Tube hold for add-on     Comment: Auto resulted       Phosphorus [[id number]]  (Abnormal) Collected:  08/05/20 1811    Specimen:  Blood from Arm, Right Updated:  08/05/20 1836     Phosphorus 6.7 mg/dL     C-reactive Protein [[id number]]  (Abnormal) Collected:  08/05/20 1811    Specimen:  Blood from Arm, Right Updated:  08/05/20 1836     C-Reactive Protein 0.87 mg/dL     Protime-INR [[id number]]  (Normal) Collected:  08/05/20 1811    Specimen:  Blood from Arm, Right Updated:  08/05/20 1833     Protime 14.3 Seconds      INR 1.06    Narrative:       Therapeutic range for most indications is 2.0-3.0 INR,  or 2.5-3.5 for mechanical heart valves.    aPTT [[id number]]  (Normal) Collected:  08/05/20 1811    Specimen:  Blood from Arm, Right Updated:  08/05/20 1833     PTT 24.7 seconds     Narrative:       The recommended Heparin therapeutic range is 68-97 seconds.    Calcium, Ionized [[id number]]  (Normal) Collected:  08/05/20 1811    Specimen:  Blood from Arm, Right Updated:  08/05/20 1816     Ionized Calcium 5.24 mg/dL         Imaging Results (Most Recent)     Procedure Component Value Units Date/Time    US Renal Bilateral [[id number]] Collected:  08/07/20 1652     Updated:  08/07/20 1737    Narrative:       PROCEDURE: US RETROPERITONEUM    Clinical History: ckd 3 [place] rule out hydro, A41.9 Sepsis,  unspecified organism R65.20 Severe sepsis without septic shock  E11.10 Type 2 diabetes mellitus with ketoacidosis without coma  N17.9 Acute kidney failure, unspecified R79.89 Other specified  abnormal findings of blood chemistry J18.9 Pneumonia, unspecified  organism    Indication: Same as  above    Comparison: None.    Technique: Grayscale and color Doppler evaluation of the  bilateral kidneys and the urinary bladder was done.    Findings:     The right kidney measures 12.9 x 6.9 x 7.3  centimeters  and the  left kidney measures 14.2 x 5.9 x 6.9  centimeters.     The urinary bladder volume at the time of imaging was 112 cc.     The bilateral kidneys do not  show any evidence of hydronephrosis  or nephrolithiasis.    Survey evaluation of the urinary bladder does not show any gross  abnormalities.    The study is somewhat limited due to patient's body habitus      Impression:       Impression:    The study is somewhat limited due to patient's body habitus.  Grossly normal bilateral kidneys and urinary bladder.     Electronically signed by:  Chris Beverly MD  8/7/2020 5:36 PM CDT  Workstation: [id number]    XR Chest PA & Lateral [[id number]] Collected:  08/07/20 0735     Updated:  08/07/20 0813    Narrative:       EXAM: XR CHEST 2 VIEWS    COMPARISONS: Radiograph 8/5/2020    FINDINGS:   Lungs are adequately expanded. No consolidation, effusion,  pneumothorax. The right lower lobe appears well aerated on  today's exam. Cardiomegaly is unchanged. No acute osseous  abnormality. Dual-lead cardiac pacer is again seen.      Impression:       No acute cardiopulmonary process.    Electronically signed by:  Lauren Hi MD  8/7/2020  8:12 AM CDT Workstation: [id number]    XR Chest 1 View [[id number]] Collected:  08/05/20 1813     Updated:  08/05/20 1831    Narrative:       [facility] [place]    Patient Name: PEBBLES WONG    ORDERING: MONO CR     ATTENDING: MONO CR     REFERRING: MONO CR    -----------------------    PROCEDURE: Chest Single View    TECHNIQUE: Single AP view of the chest    COMPARISON: None    HISTORY: Severe Sepsis triage protocol, A41.9 Sepsis, unspecified  organism R65.20 Severe sepsis without septic shock E11.10 Type 2  diabetes  "mellitus with ketoacidosis without coma    FINDINGS:     Life-support devices: There is a dual lead left subclavian  pacemaking device present.    Lungs/pleura: Hazy patchy infiltrate suspected within the right  mid to lower lung zones centrally. No dense consolidation,  effusion, or pneumothorax.    Heart, hilar and mediastinal structures: The heart size and  mediastinal contours are within limits of normal. The trachea is  midline.    Skeletal Structures: No acute findings. No free air beneath the  diaphragm.      Impression:       Hazy infiltrative change right mid to lower lung zone.    Electronically signed by:  Anthony Boyce MD  8/5/2020 6:30 PM CDT  Workstation: [id number]          Chief Complaint on Day of Discharge:  None    Hospital Course:  The patient is a 45 y.o. male who presented to [facility] with nausea and vomiting.  He was found to have diabetic ketoacidosis.  He was treated for DKA as per protocol.  He did have acute kidney injury, likely secondary to DKA.  Most likely chronic kidney disease, but I do not know his baseline creatinine.  Nephrology was consulted and followed along.  Renal function improved as patient was hydrated.  Hemoglobin A1c was checked and was greater than 16.  Patient admitted to poor compliance both with medication and diet.  Case management consulted for discharge planning, and patient stated that he had access to his medications and diabetic education etc.  He was offered diabetic education at our facility as well as home health, and he declined.  He was discharged home in good condition.  He will follow-up with his PCP in 1 to 2 weeks.  He was instructed to check his sugar 4 times a day with his insulin.  Diet compliance was encouraged.    Condition on Discharge:  Stable    Physical Exam on Discharge:  /89 (BP Location: Left arm, Patient Position: Sitting)   Pulse 89   Temp 97.7 °F (36.5 °C) (Temporal)   Resp 16   Ht 188 cm (74.02\")   Wt (!) 160 " kg (352 lb 9.6 oz)   SpO2 98%   BMI 45.25 kg/m²   Physical Exam   Constitutional: He appears well-developed and well-nourished.   Morbidly obese with a BMI of 45.25   HENT:   Head: Normocephalic and atraumatic.   Eyes: Pupils are equal, round, and reactive to light. EOM are normal.   Neck: Normal range of motion. Neck supple.   Cardiovascular: Normal rate, regular rhythm, normal heart sounds and intact distal pulses. Exam reveals no gallop and no friction rub.   No murmur heard.  Pulmonary/Chest: Effort normal and breath sounds normal. No respiratory distress. He has no wheezes. He has no rales. He exhibits no tenderness.   Abdominal: Soft. Bowel sounds are normal. He exhibits no distension. There is no tenderness.   Psychiatric: He has a normal mood and affect. His behavior is normal.   Vitals reviewed.    Discharge Disposition:  Home or Self Care    Discharge Medications:     Discharge Medications      New Medications      Instructions Start Date   insulin aspart 100 UNIT/ML injection  Commonly known as:  novoLOG   1-10 Units, Subcutaneous, 3 Times Daily With Meals      Insulin Glargine 100 UNIT/ML injection pen  Commonly known as:  BASAGLAR KWIKPEN  Replaces:  BASAGLAR KWIKPEN SC   40 Units, Subcutaneous, Nightly         Continue These Medications      Instructions Start Date   amLODIPine 10 MG tablet  Commonly known as:  NORVASC   amlodipine 10 mg tablet      aspirin 81 MG chewable tablet   aspirin 81 mg chewable tablet      carvedilol 25 MG tablet  Commonly known as:  COREG   carvedilol 25 mg tablet      hydroCHLOROthiazide 25 MG tablet  Commonly known as:  HYDRODIURIL   hydrochlorothiazide 25 mg tablet      isosorbide mononitrate 30 MG 24 hr tablet  Commonly known as:  IMDUR   isosorbide mononitrate ER 30 mg tablet,extended release 24 hr   Take 1 tablet every day by oral route.      metFORMIN 1000 MG tablet  Commonly known as:  GLUCOPHAGE   metformin 1,000 mg tablet      methocarbamol 500 MG tablet  Commonly  known as:  ROBAXIN   methocarbamol 500 mg tablet   Take 1 tablet every day by oral route.      raNITIdine 150 MG tablet  Commonly known as:  ZANTAC   ranitidine 150 mg tablet      VITAMIN D2 PO   Vitamin D2 1,250 mcg (50,000 unit) capsule         Stop These Medications    DOMINIC BUSTOS SC  Replaced by:  Insulin Glargine 100 UNIT/ML injection pen            Discharge Diet:   Diet Instructions     Diet: Consistent Carbohydrate, Cardiac      Discharge Diet:   Consistent Carbohydrate  Cardiac             Activity at Discharge:   Activity Instructions     Activity as Tolerated          Follow-up Appointments:   PCP in 1 week        This document has been electronically signed by Taj Banks MD on August 11, 2020 10:57      Time: 35 min

## 2020-08-12 ENCOUNTER — READMISSION MANAGEMENT (OUTPATIENT)
Dept: CALL CENTER | Facility: HOSPITAL | Age: 45
End: 2020-08-12

## 2020-08-12 ENCOUNTER — NURSE TRIAGE (OUTPATIENT)
Dept: CALL CENTER | Facility: HOSPITAL | Age: 45
End: 2020-08-12

## 2020-08-12 NOTE — OUTREACH NOTE
Prep Survey      Responses   [facility] facility patient discharged from?  [facility]   Is LACE score < 7 ?  No   Eligibility  Read[facility] Mgmt   Discharge diagnosis  **Diabetic ketoacidosis without coma, Severe Sepsis    Does the patient have one of the following disease processes/diagnoses(primary or secondary)?  Sepsis   Does the patient have Home health ordered?  No   Is there a DME ordered?  No   Prep survey completed?  Yes          Estefania Doran RN

## 2020-08-12 NOTE — PAYOR COMM NOTE
"Rachel Maxwell   [facility]  phone [phone]  fax [phone]    Auth# [id number]    Kenny Wong (45 y.o. Male)     Date of Birth Social Security Number Address Home Phone MRN    1975  [address]  [facility] [address] [phone] [id number]    Sabianist Marital Status          None        Admission Date Admission Type Admitting Provider Attending Provider Department, Room/Bed    8/5/20 Emergency Jed De Luna MD  [facility] OVERFLOW UNIT, 319/1    Discharge Date Discharge Disposition Discharge Destination        8/11/2020 Home or Self Care              Attending Provider:  (none)   Allergies:  Penicillins    Isolation:  None   Infection:  None   Code Status:  Prior    Ht:  188 cm (74.02\")   Wt:  160 kg (352 lb 9.6 oz)    Admission Cmt:  None   Principal Problem:  Diabetic ketoacidosis without coma (CMS/HCC) [E11.10]                 Active Insurance as of 8/5/2020     Primary Coverage     Payor Plan Insurance Group Employer/Plan Group    [facility]      Payor Plan Address Payor Plan Phone Number Payor Plan Fax Number Effective Dates    [address] [address]   3/1/2020 - None Entered    [place] [address]       Subscriber Name Subscriber Birth Date Member ID       KENNY WONG 1975 [id number]                 Emergency Contacts      (Rel.) Home Phone Work Phone Mobile Phone    Salina Wong (Spouse) [phone] -- [phone]               Discharge Summary      Taj Banks MD at 08/11/20 [facility]              [facility]  DISCHARGE SUMMARY       Date of Admission: 8/5/2020  Date of Discharge:  8/11/2020  Primary Care Physician: Maninder Dixon MD    Presenting Problem/History of Present Illness:  Elevated troponin [R79.89]  HEATHER (acute kidney injury) (CMS/HCC) [N17.9]  Severe sepsis (CMS/HCC) [A41.9, " R65.20]  Diabetic ketoacidosis without coma associated with type 2 diabetes mellitus (CMS/[facility]) [E11.10]  Pneumonia of right lung due to infectious organism, unspecified part of lung [J18.9]   Final Discharge Diagnoses:  Active Hospital Problems    Diagnosis   • **Diabetic ketoacidosis without coma (CMS/[facility])   • Severe sepsis (CMS/[facility])   • Essential hypertension   • Elevated troponin   • Community acquired pneumonia of right middle lobe of lung       Consults:   Consults     Date and Time Order Name Status Description    8/8/2020 1137 Inpatient Cardiology Consult Completed     8/7/2020 1812 Inpatient Nephrology Consult      8/7/2020 0822 Inpatient Nephrology Consult Completed           Pertinent Test Results:   Lab Results (most recent)     Procedure Component Value Units Date/Time    Magnesium [[id number]]  (Normal) Collected:  08/11/20 0606    Specimen:  Blood Updated:  08/11/20 0729     Magnesium 2.1 mg/dL     Comprehensive Metabolic Panel [[id number]]  (Abnormal) Collected:  08/11/20 0606    Specimen:  Blood Updated:  08/11/20 0729     Glucose 277 mg/dL      BUN 17 mg/dL      Creatinine 1.05 mg/dL      Sodium 136 mmol/L      Potassium 4.0 mmol/L      Chloride 103 mmol/L      CO2 21.0 mmol/L      Calcium 9.1 mg/dL      Total Protein 6.4 g/dL      Albumin 3.50 g/dL      ALT (SGPT) 123 U/L      AST (SGOT) 141 U/L      Alkaline Phosphatase 75 U/L      Total Bilirubin 0.3 mg/dL      eGFR  African Amer 93 mL/min/1.73      Globulin 2.9 gm/dL      A/G Ratio 1.2 g/dL      BUN/Creatinine Ratio 16.2     Anion Gap 12.0 mmol/L     Narrative:       GFR Normal >60  Chronic Kidney Disease <60  Kidney Failure <15      CBC & Differential [[id number]] Collected:  08/11/20 0606    Specimen:  Blood Updated:  08/11/20 0704    Narrative:       The following orders were created for panel order CBC & Differential.  Procedure                               Abnormality         Status                     ---------                                -----------         ------                     CBC Auto Differential[[id number]]        Abnormal            Final result                 Please view results for these tests on the individual orders.    CBC Auto Differential [[id number]]  (Abnormal) Collected:  08/11/20 0606    Specimen:  Blood Updated:  08/11/20 0704     WBC 5.82 10*3/mm3      RBC 4.21 10*6/mm3      Hemoglobin 11.5 g/dL      Hematocrit 35.5 %      MCV 84.3 fL      MCH 27.3 pg      MCHC 32.4 g/dL      RDW 13.0 %      RDW-SD 39.6 fl      MPV 12.0 fL      Platelets 154 10*3/mm3      Neutrophil % 49.2 %      Lymphocyte % 34.7 %      Monocyte % 12.2 %      Eosinophil % 2.9 %      Basophil % 0.7 %      Immature Grans % 0.3 %      Neutrophils, Absolute 2.86 10*3/mm3      Lymphocytes, Absolute 2.02 10*3/mm3      Monocytes, Absolute 0.71 10*3/mm3      Eosinophils, Absolute 0.17 10*3/mm3      Basophils, Absolute 0.04 10*3/mm3      Immature Grans, Absolute 0.02 10*3/mm3      nRBC 0.0 /100 WBC     POC Glucose Once [[id number]]  (Abnormal) Collected:  08/11/20 0606    Specimen:  Blood Updated:  08/11/20 0636     Glucose 261 mg/dL      Comment: RN NotifiedOperator: [id number] ALINE TRINIDADEENMeter ID: [id number]       POC Glucose Once [[id number]]  (Abnormal) Collected:  08/10/20 1948    Specimen:  Blood Updated:  08/11/20 0139     Glucose 346 mg/dL      Comment: RN NotifiedOperator: [id number] SCOTT SRIVASTAVAMeter ID: [id number]       Blood Culture - Blood, Wrist, Left [[id number]] Collected:  08/05/20 1927    Specimen:  Blood from Wrist, Left Updated:  08/10/20 1930     Blood Culture No growth at 5 days    Blood Culture - Blood, Arm, Right [[id number]] Collected:  08/05/20 1811    Specimen:  Blood from Arm, Right Updated:  08/10/20 1815     Blood Culture No growth at 5 days    Scan Slide [[id number]] Collected:  08/10/20 0525    Specimen:  Blood Updated:  08/10/20 0709     RBC Morphology Normal     WBC Morphology Normal     Platelet Estimate Adequate     "Procalcitonin [[id number]]  (Normal) Collected:  08/10/20 0525    Specimen:  Blood Updated:  08/10/20 0600     Procalcitonin 0.18 ng/mL     Narrative:       As a Marker for Sepsis (Non-Neonates):   1. <0.5 ng/mL represents a low risk of severe sepsis and/or septic shock.  1. >2 ng/mL represents a high risk of severe sepsis and/or septic shock.    As a Marker for Lower Respiratory Tract Infections that require antibiotic therapy:  PCT on Admission     Antibiotic Therapy             6-12 Hrs later  > 0.5                Strongly Recommended            >0.25 - <0.5         Recommended  0.1 - 0.25           Discouraged                   Remeasure/reassess PCT  <0.1                 Strongly Discouraged          Remeasure/reassess PCT      As 28 day mortality risk marker: \"Change in Procalcitonin Result\" (> 80 % or <=80 %) if Day 0 (or Day 1) and Day 4 values are available. Refer to http://www.Lets[employer].compct-calculator.com/   Change in PCT <=80 %   A decrease of PCT levels below or equal to 80 % defines a positive change in PCT test result representing a higher risk for 28-day all-cause mortality of patients diagnosed with severe sepsis or septic shock.  Change in PCT > 80 %   A decrease of PCT levels of more than 80 % defines a negative change in PCT result representing a lower risk for 28-day all-cause mortality of patients diagnosed with severe sepsis or septic shock.                Results may be falsely decreased if patient taking Biotin.     Magnesium [[id number]]  (Normal) Collected:  08/10/20 0525    Specimen:  Blood Updated:  08/10/20 0558     Magnesium 2.2 mg/dL     Comprehensive Metabolic Panel [[id number]]  (Abnormal) Collected:  08/10/20 0525    Specimen:  Blood Updated:  08/10/20 0558     Glucose 290 mg/dL      BUN 21 mg/dL      Creatinine 1.21 mg/dL      Sodium 137 mmol/L      Potassium 4.4 mmol/L      Chloride 104 mmol/L      CO2 22.0 mmol/L      Calcium 9.2 mg/dL      Total Protein 6.3 g/dL      Albumin 3.30 g/dL "      ALT (SGPT) 65 U/L      AST (SGOT) 94 U/L      Alkaline Phosphatase 74 U/L      Total Bilirubin 0.3 mg/dL      eGFR  African Amer 79 mL/min/1.73      Globulin 3.0 gm/dL      A/G Ratio 1.1 g/dL      BUN/Creatinine Ratio 17.4     Anion Gap 11.0 mmol/L     Narrative:       GFR Normal >60  Chronic Kidney Disease <60  Kidney Failure <15      Lactic Acid, Plasma [[id number]]  (Normal) Collected:  08/10/20 0525    Specimen:  Blood Updated:  08/10/20 0553     Lactate 1.5 mmol/L     CBC & Differential [[id number]] Collected:  08/10/20 0525    Specimen:  Blood Updated:  08/10/20 0545    Narrative:       The following orders were created for panel order CBC & Differential.  Procedure                               Abnormality         Status                     ---------                               -----------         ------                     CBC Auto Differential[[id number]]        Abnormal            Final result                 Please view results for these tests on the individual orders.    CBC Auto Differential [[id number]]  (Abnormal) Collected:  08/10/20 0525    Specimen:  Blood Updated:  08/10/20 0545     WBC 6.47 10*3/mm3      RBC 4.15 10*6/mm3      Hemoglobin 11.6 g/dL      Hematocrit 35.4 %      MCV 85.3 fL      MCH 28.0 pg      MCHC 32.8 g/dL      RDW 13.2 %      RDW-SD 41.4 fl      MPV 11.3 fL      Platelets 137 10*3/mm3      Neutrophil % 53.2 %      Lymphocyte % 29.4 %      Monocyte % 13.1 %      Eosinophil % 2.9 %      Basophil % 0.8 %      Immature Grans % 0.6 %      Neutrophils, Absolute 3.44 10*3/mm3      Lymphocytes, Absolute 1.90 10*3/mm3      Monocytes, Absolute 0.85 10*3/mm3      Eosinophils, Absolute 0.19 10*3/mm3      Basophils, Absolute 0.05 10*3/mm3      Immature Grans, Absolute 0.04 10*3/mm3      nRBC 0.0 /100 WBC     Beta-Hydroxybutyrate [[id number]] Collected:  08/06/20 0354    Specimen:  Blood Updated:  08/09/20 1308     Beta-Hydroxybutyrate Acid 1.7 mg/dL      Comment: Reference Range:  All Ages  (fasting): 0.2 - 2.8       Narrative:       Performed at:  [facility]  [address] [place] [address]  [id number]  : Ruben Landers MD, Phone:  [phone]    Glucose, Random [[id number]]  (Abnormal) Collected:  08/08/20 1936    Specimen:  Blood Updated:  08/08/20 2021     Glucose 443 mg/dL     Potassium [[id number]]  (Normal) Collected:  08/08/20 1607    Specimen:  Blood Updated:  08/08/20 1628     Potassium 5.1 mmol/L      Comment: Specimen hemolyzed.  Results may be affected.       Lactic Acid, Plasma [[id number]]  (Abnormal) Collected:  08/08/20 1158    Specimen:  Blood Updated:  08/08/20 1224     Lactate 3.6 mmol/L     Hemoglobin A1c [[id number]]  (Abnormal) Collected:  08/08/20 0427    Specimen:  Blood Updated:  08/08/20 1055     Hemoglobin A1C 16.20 %     Narrative:       Hemoglobin A1C Ranges:    Increased Risk for Diabetes  5.7% to 6.4%  Diabetes                     >= 6.5%  Diabetic Goal                < 7.0%    Basic Metabolic Panel [[id number]]  (Abnormal) Collected:  08/08/20 0427    Specimen:  Blood Updated:  08/08/20 0617     Glucose 346 mg/dL      BUN 39 mg/dL      Creatinine 1.79 mg/dL      Sodium 148 mmol/L      Potassium 5.4 mmol/L      Chloride 111 mmol/L      CO2 24.0 mmol/L      Calcium 9.0 mg/dL      eGFR  African Amer 50 mL/min/1.73      BUN/Creatinine Ratio 21.8     Anion Gap 13.0 mmol/L     Narrative:       GFR Normal >60  Chronic Kidney Disease <60  Kidney Failure <15      Protein / Creatinine Ratio, Urine - Urine, Clean Catch [[id number]] Collected:  08/07/20 1600    Specimen:  Urine, Clean Catch Updated:  08/08/20 0248     Protein/Creatinine Ratio, Urine 162.9 mg/G Crea      Creatinine, Urine 147.3 mg/dL      Total Protein, Urine 24.0 mg/dL     Creatinine, Urine, Random - Urine, Clean Catch [[id number]] Collected:  08/07/20 1600    Specimen:  Urine, Clean Catch Updated:  08/08/20 0125     Creatinine, Urine 147.3 mg/dL     Narrative:       Reference intervals for  random urine have not been established.  Clinical usage is dependent upon physician's interpretation in combination with other laboratory tests.       Sodium, Urine, Random - Urine, Clean Catch [[id number]] Collected:  08/07/20 1600    Specimen:  Urine, Clean Catch Updated:  08/07/20 1615     Sodium, Urine 50 mmol/L     Narrative:       Reference intervals for random urine have not been established.  Clinical usage is dependent upon physician's interpretation in combination with other laboratory tests.       Urine Culture - Urine, Urine, Clean Catch [[id number]] Collected:  08/06/20 0443    Specimen:  Urine, Clean Catch Updated:  08/07/20 0847     Urine Culture 50,000 CFU/mL Mixed Austin Isolated    Narrative:       Specimen contains mixed organisms of questionable pathogenicity which indicates contamination with commensal austin.  Further identification is unlikely to provide clinically useful information.  Suggest recollection.    Hemoglobin A1C - Miscellaneous Test [[id number]] Collected:  08/05/20 1811    Specimen:  Blood Updated:  08/07/20 0845     Miscellaneous Lab Test Result See attached report    Basic Metabolic Panel [[id number]]  (Abnormal) Collected:  08/07/20 0815    Specimen:  Blood Updated:  08/07/20 0845     Glucose 115 mg/dL      BUN 59 mg/dL      Creatinine 2.53 mg/dL      Sodium 152 mmol/L      Potassium 4.3 mmol/L      Comment: Specimen hemolyzed.  Results may be affected.        Chloride 119 mmol/L      CO2 23.0 mmol/L      Calcium 9.4 mg/dL      eGFR  African Amer 34 mL/min/1.73      BUN/Creatinine Ratio 23.3     Anion Gap 10.0 mmol/L     Narrative:       GFR Normal >60  Chronic Kidney Disease <60  Kidney Failure <15      Procalcitonin [[id number]]  (Abnormal) Collected:  08/07/20 0416    Specimen:  Blood Updated:  08/07/20 0525     Procalcitonin 0.38 ng/mL     Narrative:       As a Marker for Sepsis (Non-Neonates):   1. <0.5 ng/mL represents a low risk of severe sepsis and/or septic shock.  1. >2  "ng/mL represents a high risk of severe sepsis and/or septic shock.    As a Marker for Lower Respiratory Tract Infections that require antibiotic therapy:  PCT on Admission     Antibiotic Therapy             6-12 Hrs later  > 0.5                Strongly Recommended            >0.25 - <0.5         Recommended  0.1 - 0.25           Discouraged                   Remeasure/reassess PCT  <0.1                 Strongly Discouraged          Remeasure/reassess PCT      As 28 day mortality risk marker: \"Change in Procalcitonin Result\" (> 80 % or <=80 %) if Day 0 (or Day 1) and Day 4 values are available. Refer to http://www.Solar Site Designpct-calculator.com/   Change in PCT <=80 %   A decrease of PCT levels below or equal to 80 % defines a positive change in PCT test result representing a higher risk for 28-day all-cause mortality of patients diagnosed with severe sepsis or septic shock.  Change in PCT > 80 %   A decrease of PCT levels of more than 80 % defines a negative change in PCT result representing a lower risk for 28-day all-cause mortality of patients diagnosed with severe sepsis or septic shock.                Results may be falsely decreased if patient taking Biotin.     Lactate Dehydrogenase [[id number]]  (Abnormal) Collected:  08/06/20 1826    Specimen:  Blood Updated:  08/06/20 1907      U/L     Troponin [[id number]]  (Abnormal) Collected:  08/06/20 1826    Specimen:  Blood Updated:  08/06/20 1906     Troponin T 0.485 ng/mL     Narrative:       Troponin T Reference Range:  <= 0.03 ng/mL-   Negative for AMI  >0.03 ng/mL-     Abnormal for myocardial necrosis.  Clinicians would have to utilize clinical acumen, EKG, Troponin and serial changes to determine if it is an Acute Myocardial Infarction or myocardial injury due to an underlying chronic condition.       Results may be falsely decreased if patient taking Biotin.      Troponin [[id number]]  (Abnormal) Collected:  08/06/20 1115    Specimen:  Blood Updated:  08/06/20 " 1155     Troponin T 0.751 ng/mL     Narrative:       Troponin T Reference Range:  <= 0.03 ng/mL-   Negative for AMI  >0.03 ng/mL-     Abnormal for myocardial necrosis.  Clinicians would have to utilize clinical acumen, EKG, Troponin and serial changes to determine if it is an Acute Myocardial Infarction or myocardial injury due to an underlying chronic condition.       Results may be falsely decreased if patient taking Biotin.      Urinalysis, Microscopic Only - Urine, Clean Catch [[id number]]  (Abnormal) Collected:  08/06/20 0443    Specimen:  Urine, Clean Catch Updated:  08/06/20 0514     RBC, UA None Seen /HPF      WBC, UA 13-20 /HPF      Bacteria, UA 1+ /HPF      Squamous Epithelial Cells, UA 0-2 /HPF      Hyaline Casts, UA 3-6 /LPF      Methodology Manual Light Microscopy    Blood Gas, Arterial [[id number]]  (Abnormal) Collected:  08/06/20 0458    Specimen:  Arterial Blood Updated:  08/06/20 0511     Site Left Radial     Lawrence's Test N/A     pH, Arterial 7.324 pH units      Comment: 84 Value below reference range        pCO2, Arterial 42.0 mm Hg      pO2, Arterial 114.0 mm Hg      Comment: 83 Value above reference range        HCO3, Arterial 21.8 mmol/L      Base Excess, Arterial -4.1 mmol/L      Comment: 84 Value below reference range        O2 Saturation, Arterial 98.4 %      Barometric Pressure for Blood Gas 749 mmHg      Modality Nasal Cannula     Flow Rate 1.0 lpm      Ventilator Mode NA     Collected by RT     Comment: Meter: [id number]     :  [id number]       Urinalysis With Culture If Indicated - Urine, Clean Catch [[id number]]  (Abnormal) Collected:  08/06/20 0443    Specimen:  Urine, Clean Catch Updated:  08/06/20 0455     Color, UA Yellow     Appearance, UA Cloudy     pH, UA <=5.0     Specific Gravity, UA 1.029     Glucose, UA >=1000 mg/dL (3+)     Ketones, UA Trace     Bilirubin, UA Negative     Blood, UA Negative     Protein, UA 30 mg/dL (1+)     Leuk Esterase, UA Moderate (2+)      Nitrite, UA Negative     Urobilinogen, UA 0.2 E.U./dL    COVID-19, [facility] IN-HOUSE, NP SWAB IN TRANSPORT MEDIA 8-10 HR TAT - Swab, Nasopharynx [[id number]]  (Normal) Collected:  08/05/20 2002    Specimen:  Swab from Nasopharynx Updated:  08/06/20 0308     COVID19 Not Detected    Narrative:       Testing performed by Real Time RT-PCR  This test has not been approved by the [facility] but is authorized under the Emergency Use Act (EUA)    Fact sheet for providers: https://www.fda.gov/media/490626/download    Fact sheet for patients: https://www.fda.gov/media/951130/download        Blood Gas, Arterial [[id number]]  (Abnormal) Collected:  08/06/20 0110    Specimen:  Arterial Blood Updated:  08/06/20 0122     Site Left Radial     Lawrence's Test N/A     pH, Arterial 7.309 pH units      Comment: 84 Value below reference range        pCO2, Arterial 46.1 mm Hg      Comment: 83 Value above reference range        pO2, Arterial 91.6 mm Hg      HCO3, Arterial 23.1 mmol/L      Base Excess, Arterial -3.4 mmol/L      Comment: 84 Value below reference range        O2 Saturation, Arterial 96.9 %      Barometric Pressure for Blood Gas 749 mmHg      Modality Nasal Cannula     Flow Rate 2.0 lpm      Ventilator Mode NA     Collected by RT     Comment: Meter: [id number]     :  [id number]       Lactic Acid, Reflex [[id number]]  (Abnormal) Collected:  08/05/20 2225    Specimen:  Blood Updated:  08/05/20 2303     Lactate 3.7 mmol/L     Ethanol [[id number]] Collected:  08/05/20 1927    Specimen:  Blood from Arm, Right Updated:  08/05/20 2014     Ethanol <10 mg/dL      Ethanol % <0.010 %     Phosphorus [[id number]]  (Abnormal) Collected:  08/05/20 1927    Specimen:  Blood from Arm, Right Updated:  08/05/20 1950     Phosphorus 5.7 mg/dL     Osmolality, Serum [[id number]]  (Abnormal) Collected:  08/05/20 1927    Specimen:  Blood Updated:  08/05/20 1944     Osmolality 384 mOsm/kg     Ketone Bodies, Serum (Not performed at [facility]) [[id number]]  Collected:  08/05/20 1927    Specimen:  Blood Updated:  08/05/20 1939    Narrative:       The following orders were created for panel order Ketone Bodies, Serum (Not performed at [facility]).  Procedure                               Abnormality         Status                     ---------                               -----------         ------                     Acetone[[id number]]                      Abnormal            Final result                 Please view results for these tests on the individual orders.    Acetone [[id number]]  (Abnormal) Collected:  08/05/20 1927    Specimen:  Blood Updated:  08/05/20 1939     Acetone Moderate    [place] Draw [[id number]] Collected:  08/05/20 1811    Specimen:  Blood from Arm, Right Updated:  08/05/20 1915    Narrative:       The following orders were created for panel order [place] Draw.  Procedure                               Abnormality         Status                     ---------                               -----------         ------                     Light Blue Top[[id number]]                                   Final result               Green Top (Gel)[[id number]]                                  Final result               Lavender Top[[id number]]                                     Final result               Gold Top - SST[[id number]]                                                                Please view results for these tests on the individual orders.    Light Blue Top [[id number]] Collected:  08/05/20 1811    Specimen:  Blood from Arm, Right Updated:  08/05/20 1915     Extra Tube hold for add-on     Comment: Auto resulted       Green Top (Gel) [[id number]] Collected:  08/05/20 1811    Specimen:  Blood from Arm, Right Updated:  08/05/20 1915     Extra Tube Hold for add-ons.     Comment: Auto resulted.       Lavender Top [[id number]] Collected:  08/05/20 1811    Specimen:  Blood from Arm, Right Updated:  08/05/20 1915     Extra Tube hold for add-on     Comment: Auto  resulted       Phosphorus [[id number]]  (Abnormal) Collected:  08/05/20 1811    Specimen:  Blood from Arm, Right Updated:  08/05/20 1836     Phosphorus 6.7 mg/dL     C-reactive Protein [[id number]]  (Abnormal) Collected:  08/05/20 1811    Specimen:  Blood from Arm, Right Updated:  08/05/20 1836     C-Reactive Protein 0.87 mg/dL     Protime-INR [[id number]]  (Normal) Collected:  08/05/20 1811    Specimen:  Blood from Arm, Right Updated:  08/05/20 1833     Protime 14.3 Seconds      INR 1.06    Narrative:       Therapeutic range for most indications is 2.0-3.0 INR,  or 2.5-3.5 for mechanical heart valves.    aPTT [[id number]]  (Normal) Collected:  08/05/20 1811    Specimen:  Blood from Arm, Right Updated:  08/05/20 1833     PTT 24.7 seconds     Narrative:       The recommended Heparin therapeutic range is 68-97 seconds.    Calcium, Ionized [[id number]]  (Normal) Collected:  08/05/20 1811    Specimen:  Blood from Arm, Right Updated:  08/05/20 1816     Ionized Calcium 5.24 mg/dL         Imaging Results (Most Recent)     Procedure Component Value Units Date/Time    US Renal Bilateral [[id number]] Collected:  08/07/20 1652     Updated:  08/07/20 1737    Narrative:       PROCEDURE: US RETROPERITONEUM    Clinical History: ckd 3 orlando rule out hydro, A41.9 Sepsis,  unspecified organism R65.20 Severe sepsis without septic shock  E11.10 Type 2 diabetes mellitus with ketoacidosis without coma  N17.9 Acute kidney failure, unspecified R79.89 Other specified  abnormal findings of blood chemistry J18.9 Pneumonia, unspecified  organism    Indication: Same as above    Comparison: None.    Technique: Grayscale and color Doppler evaluation of the  bilateral kidneys and the urinary bladder was done.    Findings:     The right kidney measures 12.9 x 6.9 x 7.3  centimeters  and the  left kidney measures 14.2 x 5.9 x 6.9  centimeters.     The urinary bladder volume at the time of imaging was 112 cc.     The bilateral kidneys do not  show any  evidence of hydronephrosis  or nephrolithiasis.    Survey evaluation of the urinary bladder does not show any gross  abnormalities.    The study is somewhat limited due to patient's body habitus      Impression:       Impression:    The study is somewhat limited due to patient's body habitus.  Grossly normal bilateral kidneys and urinary bladder.     Electronically signed by:  Chris Beverly MD  8/7/2020 5:36 PM CDT  Workstation: [id number]    XR Chest PA & Lateral [[id number]] Collected:  08/07/20 0735     Updated:  08/07/20 0813    Narrative:       EXAM: XR CHEST 2 VIEWS    COMPARISONS: Radiograph 8/5/2020    FINDINGS:   Lungs are adequately expanded. No consolidation, effusion,  pneumothorax. The right lower lobe appears well aerated on  today's exam. Cardiomegaly is unchanged. No acute osseous  abnormality. Dual-lead cardiac pacer is again seen.      Impression:       No acute cardiopulmonary process.    Electronically signed by:  Lauren Hi MD  8/7/2020  8:12 AM CDT Workstation: [id number]    XR Chest 1 View [[id number]] Collected:  08/05/20 1813     Updated:  08/05/20 1831    Narrative:       [facility] [place]    Patient Name: PEBBLES WONG    ORDERING: MONO CR     ATTENDING: MONO CR     REFERRING: MONO CR    -----------------------    PROCEDURE: Chest Single View    TECHNIQUE: Single AP view of the chest    COMPARISON: None    HISTORY: Severe Sepsis triage protocol, A41.9 Sepsis, unspecified  organism R65.20 Severe sepsis without septic shock E11.10 Type 2  diabetes mellitus with ketoacidosis without coma    FINDINGS:     Life-support devices: There is a dual lead left subclavian  pacemaking device present.    Lungs/pleura: Hazy patchy infiltrate suspected within the right  mid to lower lung zones centrally. No dense consolidation,  effusion, or pneumothorax.    Heart, hilar and mediastinal structures: The heart size and  mediastinal contours are  "within limits of normal. The trachea is  midline.    Skeletal Structures: No acute findings. No free air beneath the  diaphragm.      Impression:       Hazy infiltrative change right mid to lower lung zone.    Electronically signed by:  Anthony Boyce MD  8/5/2020 6:30 PM CDT  Workstation: [id number]          Chief Complaint on Day of Discharge:  None    Hospital Course:  The patient is a 45 y.o. male who presented to [facility] with nausea and vomiting.  He was found to have diabetic ketoacidosis.  He was treated for DKA as per protocol.  He did have acute kidney injury, likely secondary to DKA.  Most likely chronic kidney disease, but I do not know his baseline creatinine.  Nephrology was consulted and followed along.  Renal function improved as patient was hydrated.  Hemoglobin A1c was checked and was greater than 16.  Patient admitted to poor compliance both with medication and diet.  Case management consulted for discharge planning, and patient stated that he had access to his medications and diabetic education etc.  He was offered diabetic education at our facility as well as home health, and he declined.  He was discharged home in good condition.  He will follow-up with his PCP in 1 to 2 weeks.  He was instructed to check his sugar 4 times a day with his insulin.  Diet compliance was encouraged.    Condition on Discharge:  Stable    Physical Exam on Discharge:  /89 (BP Location: Left arm, Patient Position: Sitting)   Pulse 89   Temp 97.7 °F (36.5 °C) (Temporal)   Resp 16   Ht 188 cm (74.02\")   Wt (!) 160 kg (352 lb 9.6 oz)   SpO2 98%   BMI 45.25 kg/m²    Physical Exam   Constitutional: He appears well-developed and well-nourished.   Morbidly obese with a BMI of 45.25   HENT:   Head: Normocephalic and atraumatic.   Eyes: Pupils are equal, round, and reactive to light. EOM are normal.   Neck: Normal range of motion. Neck supple.   Cardiovascular: Normal rate, regular rhythm, normal " heart sounds and intact distal pulses. Exam reveals no gallop and no friction rub.   No murmur heard.  Pulmonary/Chest: Effort normal and breath sounds normal. No respiratory distress. He has no wheezes. He has no rales. He exhibits no tenderness.   Abdominal: Soft. Bowel sounds are normal. He exhibits no distension. There is no tenderness.   Psychiatric: He has a normal mood and affect. His behavior is normal.   Vitals reviewed.    Discharge Disposition:  Home or Self Care    Discharge Medications:     Discharge Medications      New Medications      Instructions Start Date   insulin aspart 100 UNIT/ML injection  Commonly known as:  novoLOG   1-10 Units, Subcutaneous, 3 Times Daily With Meals      Insulin Glargine 100 UNIT/ML injection pen  Commonly known as:  BASAGLAR KWIKPEN  Replaces:  BASAGLAR KWIKPEN SC   40 Units, Subcutaneous, Nightly         Continue These Medications      Instructions Start Date   amLODIPine 10 MG tablet  Commonly known as:  NORVASC   amlodipine 10 mg tablet      aspirin 81 MG chewable tablet   aspirin 81 mg chewable tablet      carvedilol 25 MG tablet  Commonly known as:  COREG   carvedilol 25 mg tablet      hydroCHLOROthiazide 25 MG tablet  Commonly known as:  HYDRODIURIL   hydrochlorothiazide 25 mg tablet      isosorbide mononitrate 30 MG 24 hr tablet  Commonly known as:  IMDUR   isosorbide mononitrate ER 30 mg tablet,extended release 24 hr   Take 1 tablet every day by oral route.      metFORMIN 1000 MG tablet  Commonly known as:  GLUCOPHAGE   metformin 1,000 mg tablet      methocarbamol 500 MG tablet  Commonly known as:  ROBAXIN   methocarbamol 500 mg tablet   Take 1 tablet every day by oral route.      raNITIdine 150 MG tablet  Commonly known as:  ZANTAC   ranitidine 150 mg tablet      VITAMIN D2 PO   Vitamin D2 1,250 mcg (50,000 unit) capsule         Stop These Medications    BASAGLAR KWIKPEN SC  Replaced by:  Insulin Glargine 100 UNIT/ML injection pen            Discharge Diet:      Diet Instructions     Diet: Consistent Carbohydrate, Cardiac      Discharge Diet:   Consistent Carbohydrate  Cardiac             Activity at Discharge:   Activity Instructions     Activity as Tolerated          Follow-up Appointments:   PCP in 1 week        This document has been electronically signed by Taj Banks MD on August 11, 2020 10:57      Time: 35 min                Electronically signed by Taj Banks MD at 08/11/20 6077

## 2020-08-12 NOTE — TELEPHONE ENCOUNTER
337 BS at 10:00am when he woke up. Took his insulin too 10 units S/S   during call. Caller says he is adhering to diet. Care advise per guideline.    Reason for Disposition  • [1] Blood glucose > 300 mg/dL (16.7 mmol/L) AND [2] uses insulin (e.g., insulin-dependent, all people with type 1 diabetes)    Additional Information  • Negative: Unconscious or difficult to awaken  • Negative: Acting confused (e.g., disoriented, slurred speech)  • Negative: Very weak (e.g., can't stand)  • Negative: Sounds like a life-threatening emergency to the triager  • Negative: [1] Vomiting AND [2] signs of dehydration (e.g., very dry mouth, lightheaded, dark urine)  • Negative: [1] Blood glucose > 240 mg/dL (13.3 mmol/L) AND [2] rapid breathing  • Negative: Blood glucose > 500 mg/dL (27.8 mmol/L)  • Negative: [1] Blood glucose > 240 mg/dL (13.3 mmol/L) AND [2] urine ketones moderate-large (or more than 1+)  • Negative: [1] Blood glucose > 240 mg/dL (13.3 mmol/L) AND [2] blood ketones > 1.4 mmol/L  • Negative: [1] Blood glucose > 240 mg/dL (13.3 mmol/L) AND [2] vomiting AND [3] unable to check for ketones (in blood or urine)  • Negative: [1] New onset diabetes suspected (e.g., frequent urination, weak, weight loss) AND [2] vomiting or rapid breathing  • Negative: Vomiting lasts > 4 hours  • Negative: Patient sounds very sick or weak to the triager  • Negative: Fever > 100.4 F (38.0 C)  • Negative: Blood glucose > 400 mg/dL (22.2 mmol/L)  • Negative: [1] Blood glucose > 300 mg/dL (16.7 mmol/L) AND [2] two or more times in a row  • Negative: Urine ketones moderate - large (or blood ketones > 1.4 mmol/L)  • Negative: [1] Caller has URGENT medication or insulin pump question AND [2] triager unable to answer question  • Negative: [1] Symptoms of high blood sugar (e.g., frequent urination, weak, weight loss) AND [2] not able to test blood glucose  • Negative: New onset diabetes suspected (e.g., frequent urination, weakness, weight  "loss)  • Negative: [1] Caller has NON-URGENT medication or insulin pump question AND [2] triager unable to answer question    Answer Assessment - Initial Assessment Questions  1. BLOOD GLUCOSE: \"What is your blood glucose level?\"       337  2. ONSET: \"When did you check the blood glucose?\"  10:00am  3. USUAL RANGE: \"What is your glucose level usually?\" (e.g., usual fasting morning value, usual evening value)      *No Answer*  4. KETONES: \"Do you check for ketones (urine or blood test strips)?\" If yes, ask: \"What does the test show now?\"   no  5. TYPE 1 or 2:  \"Do you know what type of diabetes you have?\"  (e.g., Type 1, Type 2, Gestational; doesn't know)       *Type 2  6. INSULIN: \"Do you take insulin?\" \"What type of insulin(s) do you use? What is the mode of delivery? (syringe, pen; injection or pump)?\"   Took S/S 10 units Lantus 40 units last night  7. DIABETES PILLS: \"Do you take any pills for your diabetes?\" If yes, ask: \"Have you missed taking any pills recently?\"   Metformin  8. OTHER SYMPTOMS: \"Do you have any symptoms?\" (e.g., fever, frequent urination, difficulty breathing, dizziness, weakness, vomiting)  Feels fine  9. PREGNANCY: \"Is there any chance you are pregnant?\" \"When was your last menstrual period?\"      *No Answer*    Protocols used: DIABETES - HIGH BLOOD SUGAR-ADULT-AH      "